# Patient Record
Sex: FEMALE | Race: BLACK OR AFRICAN AMERICAN | Employment: OTHER | URBAN - METROPOLITAN AREA
[De-identification: names, ages, dates, MRNs, and addresses within clinical notes are randomized per-mention and may not be internally consistent; named-entity substitution may affect disease eponyms.]

---

## 2017-06-08 ENCOUNTER — HOSPITAL ENCOUNTER (OUTPATIENT)
Dept: LAB | Age: 47
Discharge: HOME | End: 2017-06-08

## 2017-06-08 LAB — T-SPOT TB TEST (EMPLOYEE),XTBE: NORMAL

## 2017-06-08 PROCEDURE — 36415 COLL VENOUS BLD VENIPUNCTURE: CPT | Performed by: EMERGENCY MEDICINE

## 2017-11-18 ENCOUNTER — HOSPITAL ENCOUNTER (EMERGENCY)
Age: 47
Discharge: HOME | End: 2017-11-18
Attending: EMERGENCY MEDICINE
Payer: SELF-PAY

## 2017-11-18 VITALS
WEIGHT: 174 LBS | RESPIRATION RATE: 20 BRPM | HEART RATE: 74 BPM | TEMPERATURE: 98.2 F | BODY MASS INDEX: 29.71 KG/M2 | DIASTOLIC BLOOD PRESSURE: 74 MMHG | HEIGHT: 64 IN | OXYGEN SATURATION: 99 % | SYSTOLIC BLOOD PRESSURE: 136 MMHG

## 2017-11-18 DIAGNOSIS — M54.10 RADICULAR PAIN: ICD-10-CM

## 2017-11-18 DIAGNOSIS — S16.1XXA STRAIN OF NECK MUSCLE, INITIAL ENCOUNTER: ICD-10-CM
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
## 2017-11-18 PROCEDURE — 99282 EMERGENCY DEPT VISIT SF MDM: CPT

## 2017-11-18 RX ORDER — CYCLOBENZAPRINE HCL 5 MG
10 TABLET ORAL 3 TIMES DAILY
Qty: 9 TAB | Refills: 0 | Status: SHIPPED | OUTPATIENT
Start: 2017-11-18 | End: 2017-12-28

## 2017-11-18 RX ORDER — NAPROXEN 500 MG/1
500 TABLET ORAL 2 TIMES DAILY WITH MEALS
Qty: 20 TAB | Refills: 0 | Status: SHIPPED | OUTPATIENT
Start: 2017-11-18 | End: 2017-11-28

## 2017-11-18 NOTE — LETTER
[address] [place] [address] [facility] 
[address] [facility] [id number] [phone] Work/School Note Date: 11/18/2017 To Whom It May concern: 
 
Randolph Oppenheim was seen and treated today in the emergency room by the following provider(s): 
Attending Provider: Joleen Gold MD 
Physician Assistant: Tiana Bradshaw PA-C. Randolph Oppenheim return to work 11/21/17 Sincerely, Tiana Bradshaw PA-C
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
## 2017-11-18 NOTE — ED NOTES
I have reviewed discharge instructions with the patient. The patient verbalized understanding. Current Discharge Medication List      START taking these medications    Details   cyclobenzaprine (FLEXERIL) 5 mg tablet Take 2 Tabs by mouth three (3) times daily. Qty: 9 Tab, Refills: 0      naproxen (NAPROSYN) 500 mg tablet Take 1 Tab by mouth two (2) times daily (with meals) for 10 days.   Qty: 20 Tab, Refills: 0         Patient armband removed and shredded

## 2017-11-18 NOTE — DISCHARGE INSTRUCTIONS
Back Pain: Care Instructions  Your Care Instructions    Back pain has many possible causes. It is often related to problems with muscles and ligaments of the back. It may also be related to problems with the nerves, discs, or bones of the back. Moving, lifting, standing, sitting, or sleeping in an awkward way can strain the back. Sometimes you don't notice the injury until later. Arthritis is another common cause of back pain. Although it may hurt a lot, back pain usually improves on its own within several weeks. Most people recover in 12 weeks or less. Using good home treatment and being careful not to stress your back can help you feel better sooner. Follow-up care is a key part of your treatment and safety. Be sure to make and go to all appointments, and call your doctor if you are having problems. It's also a good idea to know your test results and keep a list of the medicines you take. How can you care for yourself at home? · Sit or lie in positions that are most comfortable and reduce your pain. Try one of these positions when you lie down:  ¨ Lie on your back with your knees bent and supported by large pillows. ¨ Lie on the floor with your legs on the seat of a sofa or chair. Maria Elena Kevin on your side with your knees and hips bent and a pillow between your legs. ¨ Lie on your stomach if it does not make pain worse. · Do not sit up in bed, and avoid soft couches and twisted positions. Bed rest can help relieve pain at first, but it delays healing. Avoid bed rest after the first day of back pain. · Change positions every 30 minutes. If you must sit for long periods of time, take breaks from sitting. Get up and walk around, or lie in a comfortable position. · Try using a heating pad on a low or medium setting for 15 to 20 minutes every 2 or 3 hours. Try a warm shower in place of one session with the heating pad. · You can also try an ice pack for 10 to 15 minutes every 2 to 3 hours.  Put a thin cloth between the ice pack and your skin. · Take pain medicines exactly as directed. ¨ If the doctor gave you a prescription medicine for pain, take it as prescribed. ¨ If you are not taking a prescription pain medicine, ask your doctor if you can take an over-the-counter medicine. · Take short walks several times a day. You can start with 5 to 10 minutes, 3 or 4 times a day, and work up to longer walks. Walk on level surfaces and avoid hills and stairs until your back is better. · Return to work and other activities as soon as you can. Continued rest without activity is usually not good for your back. · To prevent future back pain, do exercises to stretch and strengthen your back and stomach. Learn how to use good posture, safe lifting techniques, and proper body mechanics. When should you call for help? Call your doctor now or seek immediate medical care if:  ? · You have new or worsening numbness in your legs. ? · You have new or worsening weakness in your legs. (This could make it hard to stand up.)   ? · You lose control of your bladder or bowels. ? Watch closely for changes in your health, and be sure to contact your doctor if:  ? · Your pain gets worse. ? · You are not getting better after 2 weeks. Where can you learn more? Go to http://kosta-michelle.info/. Enter H259 in the search box to learn more about \"Back Pain: Care Instructions. \"  Current as of: March 21, 2017  Content Version: 11.4  © 6152-7076 [employer]. Care instructions adapted under license by [employer] (which disclaims liability or warranty for this information). If you have questions about a medical condition or this instruction, always ask your healthcare professional. [place] [address] any warranty or liability for your use of this information. Neck Strain: Care Instructions  Your Care Instructions    You have strained the muscles and ligaments in your neck.  A sudden, awkward movement can strain the neck. This often occurs with falls or car accidents or during certain sports. Everyday activities like working on a computer or sleeping can also cause neck strain if they force you to hold your neck in an awkward position for a long time. It is common for neck pain to get worse for a day or two after an injury, but it should start to feel better after that. You may have more pain and stiffness for several days before it gets better. This is expected. It may take a few weeks or longer for it to heal completely. Good home treatment can help you get better faster and avoid future neck problems. Follow-up care is a key part of your treatment and safety. Be sure to make and go to all appointments, and call your doctor if you are having problems. It's also a good idea to know your test results and keep a list of the medicines you take. How can you care for yourself at home? · If you were given a neck brace (cervical collar) to limit neck motion, wear it as instructed for as many days as your doctor tells you to. Do not wear it longer than you were told to. Wearing a brace for too long can make neck stiffness worse and weaken the neck muscles. · You can try using heat or ice to see if it helps. ¨ Try using a heating pad on a low or medium setting for 15 to 20 minutes every 2 to 3 hours. Try a warm shower in place of one session with the heating pad. You can also buy single-use heat wraps that last up to 8 hours. ¨ You can also try an ice pack for 10 to 15 minutes every 2 to 3 hours. · Take pain medicines exactly as directed. ¨ If the doctor gave you a prescription medicine for pain, take it as prescribed. ¨ If you are not taking a prescription pain medicine, ask your doctor if you can take an over-the-counter medicine. · Gently rub the area to relieve pain and help with blood flow. Do not massage the area if it hurts to do so.   · Do not do anything that makes the pain worse. Take it easy for a couple of days. You can do your usual activities if they do not hurt your neck or put it at risk for more stress or injury. · Try sleeping on a special neck pillow. Place it under your neck, not under your head. Placing a tightly rolled-up towel under your neck while you sleep will also work. If you use a neck pillow or rolled towel, do not use your regular pillow at the same time. · To prevent future neck pain, do exercises to stretch and strengthen your neck and back. Learn how to use good posture, safe lifting techniques, and proper body mechanics. When should you call for help? Call 911 anytime you think you may need emergency care. For example, call if:  ? · You are unable to move an arm or a leg at all. ?Call your doctor now or seek immediate medical care if:  ? · You have new or worse symptoms in your arms, legs, chest, belly, or buttocks. Symptoms may include:  ¨ Numbness or tingling. ¨ Weakness. ¨ Pain. ? · You lose bladder or bowel control. ? Watch closely for changes in your health, and be sure to contact your doctor if:  ? · You are not getting better as expected. Where can you learn more? Go to http://kosta-michelle.info/. Enter M253 in the search box to learn more about \"Neck Strain: Care Instructions. \"  Current as of: March 21, 2017  Content Version: 11.4  © 2572-0985 [employer]. Care instructions adapted under license by [employer] (which disclaims liability or warranty for this information). If you have questions about a medical condition or this instruction, always ask your healthcare professional. [place] [address] any warranty or liability for your use of this information. Pinched Nerve in the Neck: Care Instructions  Your Care Instructions  A pinched nerve in the neck happens when a vertebra or disc in the upper part of your spine is damaged. This damage can happen because of an injury. Or it can just happen with age. The changes caused by the damage may put pressure on a nearby nerve root, pinching it. This causes symptoms such as sharp pain in your neck, shoulder, arm, hand, or back. You may also have tingling or numbness. Sometimes it makes your arm weaker. The symptoms are usually worse when you turn your head or strain your neck. For many people, the symptoms get better over time and finally go away. Early treatment usually includes medicines for pain and swelling. Sometimes physical therapy and special exercises may help. Follow-up care is a key part of your treatment and safety. Be sure to make and go to all appointments, and call your doctor if you are having problems. It's also a good idea to know your test results and keep a list of the medicines you take. How can you care for yourself at home? · Be safe with medicines. Read and follow all instructions on the label. ¨ If the doctor gave you a prescription medicine for pain, take it as prescribed. ¨ If you are not taking a prescription pain medicine, ask your doctor if you can take an over-the-counter medicine. · Try using a heating pad on a low or medium setting for 15 to 20 minutes every 2 or 3 hours. Try a warm shower in place of one session with the heating pad. You can also buy single-use heat wraps that last up to 8 hours. · You can also try an ice pack for 10 to 15 minutes every 2 to 3 hours. There isn't strong evidence that either heat or ice will help. But you can try them to see if they help you. · Don't spend too long in one position. Take short breaks to move around and change positions. · Wear a seat belt and shoulder harness when you are in a car. · Sleep with a pillow under your head and neck that keeps your neck straight. · If you were given a neck brace (cervical collar) to limit neck motion, wear it as instructed for as many days as your doctor tells you to. Do not wear it longer than you were told to. Wearing a brace for too long can lead to neck stiffness and can weaken the neck muscles. · Follow your doctor's instructions for gentle neck-stretching exercises. · Do not smoke. Smoking can slow healing of your discs. If you need help quitting, talk to your doctor about stop-smoking programs and medicines. These can increase your chances of quitting for good. · Avoid strenuous work or exercise until your doctor says it is okay. When should you call for help? Call 911 anytime you think you may need emergency care. For example, call if:  ? · You are unable to move an arm or a leg at all. ?Call your doctor now or seek immediate medical care if:  ? · You have new or worse symptoms in your arms, legs, chest, belly, or buttocks. Symptoms may include:  ¨ Numbness or tingling. ¨ Weakness. ¨ Pain. ? · You lose bladder or bowel control. ? Watch closely for changes in your health, and be sure to contact your doctor if:  ? · You are not getting better as expected. Where can you learn more? Go to http://kosta-michelle.info/. Enter Y688 in the search box to learn more about \"Pinched Nerve in the Neck: Care Instructions. \"  Current as of: March 21, 2017  Content Version: 11.4  © 3911-9602 [employer]. Care instructions adapted under license by [employer] (which disclaims liability or warranty for this information). If you have questions about a medical condition or this instruction, always ask your healthcare professional. [place] [address] any warranty or liability for your use of this information. Motor Vehicle Accident: Care Instructions  Your Care Instructions    You were seen by a doctor after a motor vehicle accident. Because of the accident, you may be sore for several days. Over the next few days, you may hurt more than you did just after the accident. The doctor has checked you carefully, but problems can develop later.  If you notice any problems or new symptoms, get medical treatment right away. Follow-up care is a key part of your treatment and safety. Be sure to make and go to all appointments, and call your doctor if you are having problems. It's also a good idea to know your test results and keep a list of the medicines you take. How can you care for yourself at home? · Keep track of any new symptoms or changes in your symptoms. · Take it easy for the next few days, or longer if you are not feeling well. Do not try to do too much. · Put ice or a cold pack on any sore areas for 10 to 20 minutes at a time to stop swelling. Put a thin cloth between the ice pack and your skin. Do this several times a day for the first 2 days. · Be safe with medicines. Take pain medicines exactly as directed. ¨ If the doctor gave you a prescription medicine for pain, take it as prescribed. ¨ If you are not taking a prescription pain medicine, ask your doctor if you can take an over-the-counter medicine. · Do not drive after taking a prescription pain medicine. · Do not do anything that makes the pain worse. · Do not drink any alcohol for 24 hours or until your doctor tells you it is okay. When should you call for help? Call 911 if:  ? · You passed out (lost consciousness). ?Call your doctor now or seek immediate medical care if:  ? · You have new or worse belly pain. ? · You have new or worse trouble breathing. ? · You have new or worse head pain. ? · You have new pain, or your pain gets worse. ? · You have new symptoms, such as numbness or vomiting. ? Watch closely for changes in your health, and be sure to contact your doctor if:  ? · You are not getting better as expected. Where can you learn more? Go to http://kosta-michelle.info/. Enter F084 in the search box to learn more about \"Motor Vehicle Accident: Care Instructions. \"  Current as of: March 20, 2017  Content Version: 11.4  © 6491-3880 [employer]. Care instructions adapted under license by [employer] (which disclaims liability or warranty for this information). If you have questions about a medical condition or this instruction, always ask your healthcare professional. [address] any warranty or liability for your use of this information.
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
## 2017-12-28 ENCOUNTER — HOSPITAL ENCOUNTER (EMERGENCY)
Age: 47
Discharge: HOME | End: 2017-12-28
Attending: EMERGENCY MEDICINE
Payer: MEDICAID

## 2017-12-28 ENCOUNTER — APPOINTMENT (OUTPATIENT)
Dept: CT IMAGING | Age: 47
End: 2017-12-28
Attending: EMERGENCY MEDICINE
Payer: MEDICAID

## 2017-12-28 VITALS
HEIGHT: 65 IN | DIASTOLIC BLOOD PRESSURE: 88 MMHG | HEART RATE: 72 BPM | SYSTOLIC BLOOD PRESSURE: 101 MMHG | TEMPERATURE: 98.2 F | WEIGHT: 170 LBS | BODY MASS INDEX: 28.32 KG/M2 | RESPIRATION RATE: 20 BRPM | OXYGEN SATURATION: 98 %

## 2017-12-28 DIAGNOSIS — A59.03 TRICHOMONAL CYSTITIS: ICD-10-CM

## 2017-12-28 DIAGNOSIS — N30.01 ACUTE CYSTITIS WITH HEMATURIA: Primary | ICD-10-CM

## 2017-12-28 LAB
ALBUMIN SERPL-MCNC: 3.5 G/DL (ref 3.4–5)
ALBUMIN/GLOB SERPL: 1 {RATIO} (ref 0.8–1.7)
ALP SERPL-CCNC: 83 U/L (ref 45–117)
ALT SERPL-CCNC: 28 U/L (ref 13–56)
AMYLASE SERPL-CCNC: 66 U/L (ref 25–115)
ANION GAP SERPL CALC-SCNC: 8 MMOL/L (ref 3–18)
APPEARANCE UR: CLEAR
AST SERPL-CCNC: 27 U/L (ref 15–37)
BASOPHILS # BLD: 0 K/UL (ref 0–0.06)
BASOPHILS NFR BLD: 0 % (ref 0–2)
BILIRUB SERPL-MCNC: 0.3 MG/DL (ref 0.2–1)
BILIRUB UR QL: NEGATIVE
BUN SERPL-MCNC: 8 MG/DL (ref 7–18)
BUN/CREAT SERPL: 10 (ref 12–20)
CALCIUM SERPL-MCNC: 8.9 MG/DL (ref 8.5–10.1)
CHLORIDE SERPL-SCNC: 104 MMOL/L (ref 100–108)
CO2 SERPL-SCNC: 28 MMOL/L (ref 21–32)
COLOR UR: YELLOW
CREAT SERPL-MCNC: 0.82 MG/DL (ref 0.6–1.3)
DIFFERENTIAL METHOD BLD: ABNORMAL
EOSINOPHIL # BLD: 0.1 K/UL (ref 0–0.4)
EOSINOPHIL NFR BLD: 1 % (ref 0–5)
EPITH CASTS URNS QL MICRO: ABNORMAL /LPF (ref 0–5)
ERYTHROCYTE [DISTWIDTH] IN BLOOD BY AUTOMATED COUNT: 11.9 % (ref 11.6–14.5)
GLOBULIN SER CALC-MCNC: 3.6 G/DL (ref 2–4)
GLUCOSE SERPL-MCNC: 112 MG/DL (ref 74–99)
GLUCOSE UR STRIP.AUTO-MCNC: NEGATIVE MG/DL
HCT VFR BLD AUTO: 32.9 % (ref 35–45)
HEMOCCULT STL QL: NEGATIVE
HGB BLD-MCNC: 10.4 G/DL (ref 12–16)
HGB UR QL STRIP: ABNORMAL
KETONES UR QL STRIP.AUTO: NEGATIVE MG/DL
LEUKOCYTE ESTERASE UR QL STRIP.AUTO: ABNORMAL
LIPASE SERPL-CCNC: 86 U/L (ref 73–393)
LYMPHOCYTES # BLD: 2.2 K/UL (ref 0.9–3.6)
LYMPHOCYTES NFR BLD: 38 % (ref 21–52)
MCH RBC QN AUTO: 27.1 PG (ref 24–34)
MCHC RBC AUTO-ENTMCNC: 31.6 G/DL (ref 31–37)
MCV RBC AUTO: 85.7 FL (ref 74–97)
MONOCYTES # BLD: 0.7 K/UL (ref 0.05–1.2)
MONOCYTES NFR BLD: 13 % (ref 3–10)
MUCOUS THREADS URNS QL MICRO: ABNORMAL /LPF
NEUTS SEG # BLD: 2.8 K/UL (ref 1.8–8)
NEUTS SEG NFR BLD: 48 % (ref 40–73)
NITRITE UR QL STRIP.AUTO: NEGATIVE
PH UR STRIP: 6 [PH] (ref 5–8)
PLATELET # BLD AUTO: 273 K/UL (ref 135–420)
PMV BLD AUTO: 10.6 FL (ref 9.2–11.8)
POTASSIUM SERPL-SCNC: 3.1 MMOL/L (ref 3.5–5.5)
PROT SERPL-MCNC: 7.1 G/DL (ref 6.4–8.2)
PROT UR STRIP-MCNC: NEGATIVE MG/DL
RBC # BLD AUTO: 3.84 M/UL (ref 4.2–5.3)
RBC #/AREA URNS HPF: ABNORMAL /HPF (ref 0–5)
SODIUM SERPL-SCNC: 140 MMOL/L (ref 136–145)
SP GR UR REFRACTOMETRY: 1.02 (ref 1–1.03)
TRICHOMONAS UR QL MICRO: ABNORMAL
UROBILINOGEN UR QL STRIP.AUTO: 1 EU/DL (ref 0.2–1)
WBC # BLD AUTO: 5.8 K/UL (ref 4.6–13.2)
WBC URNS QL MICRO: ABNORMAL /HPF (ref 0–4)

## 2017-12-28 PROCEDURE — 99283 EMERGENCY DEPT VISIT LOW MDM: CPT

## 2017-12-28 PROCEDURE — 82270 OCCULT BLOOD FECES: CPT

## 2017-12-28 PROCEDURE — 74011250636 HC RX REV CODE- 250/636: Performed by: EMERGENCY MEDICINE

## 2017-12-28 PROCEDURE — 80053 COMPREHEN METABOLIC PANEL: CPT | Performed by: EMERGENCY MEDICINE

## 2017-12-28 PROCEDURE — 96360 HYDRATION IV INFUSION INIT: CPT

## 2017-12-28 PROCEDURE — 74177 CT ABD & PELVIS W/CONTRAST: CPT

## 2017-12-28 PROCEDURE — 74011636320 HC RX REV CODE- 636/320: Performed by: EMERGENCY MEDICINE

## 2017-12-28 PROCEDURE — 83690 ASSAY OF LIPASE: CPT | Performed by: EMERGENCY MEDICINE

## 2017-12-28 PROCEDURE — 85025 COMPLETE CBC W/AUTO DIFF WBC: CPT | Performed by: EMERGENCY MEDICINE

## 2017-12-28 PROCEDURE — 81001 URINALYSIS AUTO W/SCOPE: CPT | Performed by: EMERGENCY MEDICINE

## 2017-12-28 PROCEDURE — 82150 ASSAY OF AMYLASE: CPT | Performed by: EMERGENCY MEDICINE

## 2017-12-28 RX ORDER — NITROFURANTOIN 25; 75 MG/1; MG/1
100 CAPSULE ORAL 2 TIMES DAILY
Qty: 20 CAP | Refills: 0 | Status: SHIPPED | OUTPATIENT
Start: 2017-12-28 | End: 2018-01-07

## 2017-12-28 RX ORDER — METRONIDAZOLE 500 MG/1
500 TABLET ORAL 2 TIMES DAILY
Qty: 14 TAB | Refills: 0 | Status: SHIPPED | OUTPATIENT
Start: 2017-12-28 | End: 2018-01-04

## 2017-12-28 RX ADMIN — IOPAMIDOL 100 ML: 612 INJECTION, SOLUTION INTRAVENOUS at 07:44

## 2017-12-28 RX ADMIN — SODIUM CHLORIDE 1000 ML: 900 INJECTION, SOLUTION INTRAVENOUS at 07:18

## 2017-12-28 NOTE — ED NOTES
Patient noted to have removed gown and dressed back into her clothes. Instructed patient to please get back into her gown, for ordered CT scan.

## 2017-12-28 NOTE — ED NOTES
Patient states she went to the bathroom this morning and noted drips of blood going into the toilet. Patient states she does not know if it is coming from her vagina or rectum. No active bleeding noted to either site. Guiac stool negative and Provider informed. Patient denies taking any blood thinners.

## 2017-12-28 NOTE — ED TRIAGE NOTES
Patient reports to ED with complaint of vaginal bleeding and a cramping sensation starting this morning.

## 2017-12-28 NOTE — ED PROVIDER NOTES
EMERGENCY DEPARTMENT HISTORY AND PHYSICAL EXAM    6:39 AM      Date: 12/28/2017  Patient Name: Sathya Jimenez    History of Presenting Illness     Chief Complaint   Patient presents with    Vaginal Bleeding         History Provided By: Patient    Chief Complaint: abdominal pain   Duration: began this morning after urination  Timing:  Constant  Location: right side  Quality: Aching  Severity: mild  Modifying Factors:   Associated Symptoms: vaginal bleeding      Additional History (Context): Sathya Jimenez is a 41 y.o. female with history of fibroids and hysterectomy who presents with right-sided abdominal pain and vaginal bleeding. Patient states that she urinated this morning and noticed bright red blood in the toilet and after wiping herself. She denies any previous complications s/p hysterectomy. No other symptoms or concerns were expressed. PCP: Kristen Juarez MD        Past History     Past Medical History:  Past Medical History:   Diagnosis Date    Fibroids     uterine fibroids       Past Surgical History:  Past Surgical History:   Procedure Laterality Date    HX HYSTERECTOMY      HX OTHER SURGICAL      surgery for punctured intestine    HX OVARIAN CYST REMOVAL Bilateral     HX TUBAL LIGATION         Family History:  Family History   Problem Relation Age of Onset    Hypertension Mother     Diabetes Mother        Social History:  Social History   Substance Use Topics    Smoking status: Never Smoker    Smokeless tobacco: Never Used    Alcohol use No       Allergies:  No Known Allergies      Review of Systems     Review of Systems   Constitutional: Negative. HENT: Negative. Eyes: Negative. Respiratory: Negative. Cardiovascular: Negative. Gastrointestinal: Positive for abdominal pain. Endocrine: Negative. Genitourinary: Positive for vaginal bleeding. Musculoskeletal: Negative. Skin: Negative. Allergic/Immunologic: Negative. Neurological: Negative. Hematological: Negative. Psychiatric/Behavioral: Negative. All other systems reviewed and are negative. Physical Exam     Visit Vitals    /88    Pulse 72    Temp 98.2 °F (36.8 °C)    Resp 20    Ht 5' 5\" (1.651 m)    Wt 77.1 kg (170 lb)    LMP 01/15/2016    SpO2 98%    BMI 28.29 kg/m2       Physical Exam   Constitutional: She is oriented to person, place, and time. She appears well-developed and well-nourished. No distress. HENT:   Head: Normocephalic. Right Ear: External ear normal.   Left Ear: External ear normal.   Mouth/Throat: No oropharyngeal exudate. Eyes: Conjunctivae and EOM are normal. Pupils are equal, round, and reactive to light. Right eye exhibits no discharge. Left eye exhibits no discharge. No scleral icterus. Neck: Normal range of motion. Neck supple. No JVD present. No tracheal deviation present. No thyromegaly present. Cardiovascular: Normal rate, regular rhythm, normal heart sounds and intact distal pulses. Exam reveals no gallop and no friction rub. No murmur heard. Pulmonary/Chest: Effort normal and breath sounds normal. No stridor. No respiratory distress. She has no wheezes. She has no rales. She exhibits no tenderness. Abdominal: Soft. Bowel sounds are normal. She exhibits no distension and no mass. There is tenderness (right lower quadrant ). There is no rebound and no guarding. Genitourinary: There is no lesion on the right labia. There is no lesion on the left labia. No bleeding in the vagina. No vaginal discharge found. Musculoskeletal: Normal range of motion. She exhibits no edema or tenderness. Lymphadenopathy:     She has no cervical adenopathy. Neurological: She is alert and oriented to person, place, and time. She displays normal reflexes. No cranial nerve deficit. She exhibits normal muscle tone. Coordination normal.   Skin: Skin is warm and dry. No rash noted. She is not diaphoretic. No erythema. No pallor.    Nursing note and vitals reviewed. Rectal exam: brown stool hemocult - negative for blood      Diagnostic Study Results     Labs -  Recent Results (from the past 12 hour(s))   URINALYSIS W/ RFLX MICROSCOPIC    Collection Time: 12/28/17  6:25 AM   Result Value Ref Range    Color YELLOW      Appearance CLEAR      Specific gravity 1.016 1.005 - 1.030      pH (UA) 6.0 5.0 - 8.0      Protein NEGATIVE  NEG mg/dL    Glucose NEGATIVE  NEG mg/dL    Ketone NEGATIVE  NEG mg/dL    Bilirubin NEGATIVE  NEG      Blood MODERATE (A) NEG      Urobilinogen 1.0 0.2 - 1.0 EU/dL    Nitrites NEGATIVE  NEG      Leukocyte Esterase MODERATE (A) NEG     POC FECAL OCCULT BLOOD    Collection Time: 12/28/17  6:35 AM   Result Value Ref Range    Occult blood, stool (POC) NEGATIVE  NEG         Medical Decision Making   I am the first provider for this patient. I reviewed the vital signs, available nursing notes, past medical history, past surgical history, family history and social history. Vital Signs-Reviewed the patient's vital signs. Pulse Oximetry Analysis -  98% on room air    Records Reviewed: Nursing Notes (Time of Review: 6:39 AM)    Provider Notes (Medical Decision Making): hemorrhagic cystitis, bladder tumor, appendicitis, UTI    ED Course: Progress Notes, Reevaluation, and Consults:    7:00 AM : Pt care transferred to Dr. Juan R Rivero  ,ED provider. History of patient complaint(s), available diagnostic reports and current treatment plan has been discussed thoroughly. Bedside rounding on patient occured : yes .   Intended disposition of patient : TBD  Pending diagnostics reports and/or labs (please list): CT Abd    Diagnosis     Clinical Impression:     Disposition: TBD    Follow-up Information     None           Patient's Medications   Start Taking    No medications on file   Continue Taking    No medications on file   These Medications have changed    No medications on file   Stop Taking    CYCLOBENZAPRINE (FLEXERIL) 5 MG TABLET    Take 2 Tabs by mouth three (3) times daily. _______________________________    Attestations:  Satay Easley acting as a scribe for and in the presence of Sandor Kimble MD      December 28, 2017 at 6:39 AM       Provider Attestation:      I personally performed the services described in the documentation, reviewed the documentation, as recorded by the scribe in my presence, and it accurately and completely records my words and actions. December 28, 2017 at 6:39 AM - Sandor Kimble MD    _______________________________  Results reviewed with pt, she agrees with dispo and F/U plan.   Rosa aRm MD  8:56 AM

## 2017-12-28 NOTE — LETTER
[address] [place] [address] [facility] 
[address] [facility] [address] [phone] Work/School Note Date: 12/28/2017 To Whom It May concern: 
 
Mary Wilson was seen and treated today in the emergency room by the following provider(s): 
Attending Provider: Tremaine Chadwick MD.   
 
Mary Wilson Return to work on 12-29-17.   
 
Sincerely, 
 
 
 
 
Tremaine Chadwick MD

## 2017-12-28 NOTE — ED NOTES
Dinorah Kaufman is a 41 y.o. female that was discharged in good condition. The patients diagnosis, condition and treatment were explained to  patient and aftercare instructions were given. The patient verbalized understanding. Patient armband removed and shredded.

## 2017-12-28 NOTE — DISCHARGE INSTRUCTIONS

## 2018-03-31 PROCEDURE — 99282 EMERGENCY DEPT VISIT SF MDM: CPT

## 2018-04-01 ENCOUNTER — HOSPITAL ENCOUNTER (EMERGENCY)
Age: 48
Discharge: HOME | End: 2018-04-01
Attending: EMERGENCY MEDICINE
Payer: MEDICAID

## 2018-04-01 VITALS
TEMPERATURE: 97.8 F | HEART RATE: 82 BPM | RESPIRATION RATE: 24 BRPM | BODY MASS INDEX: 28.29 KG/M2 | DIASTOLIC BLOOD PRESSURE: 86 MMHG | WEIGHT: 170 LBS | OXYGEN SATURATION: 100 % | SYSTOLIC BLOOD PRESSURE: 132 MMHG

## 2018-04-01 DIAGNOSIS — H65.112 ACUTE MUCOID OTITIS MEDIA OF LEFT EAR: Primary | ICD-10-CM

## 2018-04-01 PROCEDURE — 74011250637 HC RX REV CODE- 250/637: Performed by: EMERGENCY MEDICINE

## 2018-04-01 RX ORDER — AMOXICILLIN 500 MG/1
500 TABLET, FILM COATED ORAL 3 TIMES DAILY
Qty: 30 TAB | Refills: 0 | Status: SHIPPED | OUTPATIENT
Start: 2018-04-01 | End: 2018-09-23

## 2018-04-01 RX ORDER — HYDROCODONE BITARTRATE AND ACETAMINOPHEN 5; 325 MG/1; MG/1
1 TABLET ORAL
Qty: 8 TAB | Refills: 0 | Status: SHIPPED | OUTPATIENT
Start: 2018-04-01 | End: 2018-09-23

## 2018-04-01 RX ORDER — AMOXICILLIN 250 MG/1
500 CAPSULE ORAL
Status: COMPLETED | OUTPATIENT
Start: 2018-04-01 | End: 2018-04-01

## 2018-04-01 RX ORDER — HYDROCODONE BITARTRATE AND ACETAMINOPHEN 5; 325 MG/1; MG/1
1 TABLET ORAL
Qty: 8 TAB | Refills: 0 | Status: SHIPPED | OUTPATIENT
Start: 2018-04-01 | End: 2018-04-01

## 2018-04-01 RX ORDER — HYDROCODONE BITARTRATE AND ACETAMINOPHEN 5; 325 MG/1; MG/1
1 TABLET ORAL
Status: COMPLETED | OUTPATIENT
Start: 2018-04-01 | End: 2018-04-01

## 2018-04-01 RX ADMIN — AMOXICILLIN 500 MG: 250 CAPSULE ORAL at 00:47

## 2018-04-01 RX ADMIN — HYDROCODONE BITARTRATE AND ACETAMINOPHEN 1 TABLET: 5; 325 TABLET ORAL at 00:47

## 2018-04-01 NOTE — ED PROVIDER NOTES
EMERGENCY DEPARTMENT HISTORY AND PHYSICAL EXAM    12:27 AM      Date: 4/1/2018  Patient Name: Uriel Gr    History of Presenting Illness     No chief complaint on file. History Provided By: Patient    Chief Complaint: Ear Pain  Duration:  Weeks  Timing:  Acute and Constant  Location: Left ear  Quality: Aching  Severity: Moderate  Modifying Factors: Worsens at night. and no alleviating factors. Associated Symptoms: Left temple and jaw pain      Additional History (Context): Uriel Gr is a 41 y.o. female with No significant past medical history who presents with complaints of acute, constant, moderate and aching left ear pain which \"radiates\"  to her jaw and to her temple for two weeks. The patient also notes that she feels the pain all day but it worsens at night and no alleviating factors. The patient has no other symptoms or complaints at this time. PCP: Darell Sandifer, MD    Current Outpatient Prescriptions   Medication Sig Dispense Refill    IBUPROFEN PO Take  by mouth.  amoxicillin 500 mg tab Take 500 mg by mouth three (3) times daily. 30 Tab 0    HYDROcodone-acetaminophen (NORCO) 5-325 mg per tablet Take 1 Tab by mouth every six (6) hours as needed for Pain. Max Daily Amount: 4 Tabs.  8 Tab 0       Past History     Past Medical History:  Past Medical History:   Diagnosis Date    Fibroids     uterine fibroids       Past Surgical History:  Past Surgical History:   Procedure Laterality Date    HX HYSTERECTOMY      HX OTHER SURGICAL      surgery for punctured intestine    HX OVARIAN CYST REMOVAL Bilateral     HX TUBAL LIGATION         Family History:  Family History   Problem Relation Age of Onset    Hypertension Mother     Diabetes Mother        Social History:  Social History   Substance Use Topics    Smoking status: Never Smoker    Smokeless tobacco: Never Used    Alcohol use No       Allergies:  No Known Allergies      Review of Systems       Review of Systems Constitutional: Negative. Negative for chills and fever. HENT: Positive for ear pain (left). Eyes: Negative. Respiratory: Negative. Cardiovascular: Negative. Gastrointestinal: Negative. Negative for diarrhea and nausea. Endocrine: Negative. Genitourinary: Negative. Musculoskeletal:        +Jaw Pain   Skin: Negative. Allergic/Immunologic: Negative. Neurological: Negative. Hematological: Negative. Psychiatric/Behavioral: Negative. All other systems reviewed and are negative. Physical Exam     Visit Vitals    /86 (BP 1 Location: Left arm, BP Patient Position: At rest)    Pulse 82    Temp 97.8 °F (36.6 °C)    Resp 24    Wt 77.1 kg (170 lb)    LMP 01/15/2016    SpO2 100%    BMI 28.29 kg/m2         Physical Exam   Constitutional: She is oriented to person, place, and time. She appears well-developed and well-nourished. No distress. HENT:   Head: Normocephalic. Right Ear: External ear normal.   Left Ear: External ear normal. Tympanic membrane is erythematous. Mouth/Throat: No oropharyngeal exudate. Dull left TM   Eyes: Conjunctivae and EOM are normal. Pupils are equal, round, and reactive to light. Right eye exhibits no discharge. Left eye exhibits no discharge. No scleral icterus. Neck: Normal range of motion. Neck supple. No JVD present. No tracheal deviation present. No thyromegaly present. Cardiovascular: Normal rate, regular rhythm, normal heart sounds and intact distal pulses. Exam reveals no gallop and no friction rub. No murmur heard. Pulmonary/Chest: Effort normal and breath sounds normal. No stridor. No respiratory distress. She has no wheezes. She has no rales. She exhibits no tenderness. Abdominal: Soft. Bowel sounds are normal. She exhibits no distension and no mass. There is no tenderness. There is no rebound and no guarding. Musculoskeletal: Normal range of motion. She exhibits no edema or tenderness. Lymphadenopathy:     She has no cervical adenopathy. Neurological: She is alert and oriented to person, place, and time. She displays normal reflexes. No cranial nerve deficit. She exhibits normal muscle tone. Coordination normal.   Skin: Skin is warm and dry. No rash noted. She is not diaphoretic. No erythema. No pallor. Nursing note and vitals reviewed. Diagnostic Study Results     Labs -  No results found for this or any previous visit (from the past 12 hour(s)). Radiologic Studies -   No orders to display         Medical Decision Making   I am the first provider for this patient. I reviewed the vital signs, available nursing notes, past medical history, past surgical history, family history and social history. Vital Signs-Reviewed the patient's vital signs. Pulse Oximetry Analysis -  100% on room air (Interpretation)normal      Records Reviewed: Nursing Notes (Time of Review: 12:27 AM)    ED Course: Progress Notes, Reevaluation, and Consults:   Patient remained stable. Provider Notes (Medical Decision Making): Dif Dx: OM, URI,     Diagnosis     Clinical Impression:   1. Acute mucoid otitis media of left ear        Disposition:discharged    Follow-up Information     Follow up With Details Comments Contact Info    Bekah Celeste MD Call in 2 days For follow up [address] [facility]      [id number] [facility] EMERGENCY DEPT Go to As needed, If symptoms worsen [address] [id number] [phone]           Patient's Medications   Start Taking    AMOXICILLIN 500 MG TAB    Take 500 mg by mouth three (3) times daily. HYDROCODONE-ACETAMINOPHEN (NORCO) 5-325 MG PER TABLET    Take 1 Tab by mouth every six (6) hours as needed for Pain. Max Daily Amount: 4 Tabs. Continue Taking    IBUPROFEN PO    Take  by mouth.    These Medications have changed    No medications on file   Stop Taking    No medications on file _______________________________    Attestations:  Scribe Attestation     Archie Clark acting as a scribe for and in the presence of Reggie Todd MD      April 01, 2018 at 12:27 AM       Provider Attestation:      I personally performed the services described in the documentation, reviewed the documentation, as recorded by the scribe in my presence, and it accurately and completely records my words and actions.  April 01, 2018 at 12:27 AM - Reggie Todd MD    _______________________________

## 2018-04-01 NOTE — ED TRIAGE NOTES
Tightness/pressure to left temporal region radiating into left ear; per pt wind in her ear exacerbates pain

## 2018-04-01 NOTE — ED NOTES
Pt instructed to follow up with her PCP, take medications as prescribed, to use caution with narcotics, and to return for worsening ear pain/drainage/other concerns.

## 2018-09-23 ENCOUNTER — HOSPITAL ENCOUNTER (EMERGENCY)
Age: 48
Discharge: HOME | End: 2018-09-23
Attending: EMERGENCY MEDICINE
Payer: MEDICAID

## 2018-09-23 ENCOUNTER — APPOINTMENT (OUTPATIENT)
Dept: GENERAL RADIOLOGY | Age: 48
End: 2018-09-23
Attending: PHYSICIAN ASSISTANT
Payer: MEDICAID

## 2018-09-23 VITALS
HEIGHT: 65 IN | SYSTOLIC BLOOD PRESSURE: 108 MMHG | BODY MASS INDEX: 30.57 KG/M2 | HEART RATE: 94 BPM | OXYGEN SATURATION: 100 % | TEMPERATURE: 99.5 F | RESPIRATION RATE: 18 BRPM | WEIGHT: 183.5 LBS | DIASTOLIC BLOOD PRESSURE: 64 MMHG

## 2018-09-23 DIAGNOSIS — J06.9 ACUTE UPPER RESPIRATORY INFECTION: Primary | ICD-10-CM

## 2018-09-23 PROCEDURE — 71046 X-RAY EXAM CHEST 2 VIEWS: CPT

## 2018-09-23 PROCEDURE — 99282 EMERGENCY DEPT VISIT SF MDM: CPT

## 2018-09-23 RX ORDER — BENZONATATE 100 MG/1
100 CAPSULE ORAL
Qty: 30 CAP | Refills: 0 | Status: SHIPPED | OUTPATIENT
Start: 2018-09-23 | End: 2018-09-30

## 2018-09-23 RX ORDER — LORATADINE 10 MG/1
TABLET ORAL
Qty: 30 TAB | Refills: 0 | Status: SHIPPED | OUTPATIENT
Start: 2018-09-23 | End: 2018-12-16

## 2018-09-23 NOTE — DISCHARGE INSTRUCTIONS

## 2018-09-23 NOTE — ED NOTES
Juan Heart is a 57 y.o. female that was discharged in stable condition. The patients diagnosis, condition and treatment were explained to  patient and aftercare instructions were given. The patient verbalized understanding. Patient armband removed and shredded.

## 2018-09-23 NOTE — ED PROVIDER NOTES
Patient is a 57 y.o. female presenting with cough and headaches. The history is provided by the patient. Cough This is a new problem. Episode onset: 2 weeks. The problem occurs constantly. The problem has not changed since onset. The cough is non-productive. There has been no fever. Associated symptoms include chills, headaches, rhinorrhea, sore throat, myalgias and wheezing. Pertinent negatives include no sweats, no nausea, no vomiting and no confusion. She has tried nothing for the symptoms. She is not a smoker. Her past medical history does not include bronchitis, pneumonia, COPD, asthma or heart failure. Headache Pertinent negatives include no nausea and no vomiting. Past Medical History:  
Diagnosis Date  Fibroids   
 uterine fibroids Past Surgical History:  
Procedure Laterality Date  HX HYSTERECTOMY  HX OTHER SURGICAL    
 surgery for punctured intestine  HX OVARIAN CYST REMOVAL Bilateral   
 HX TUBAL LIGATION Family History:  
Problem Relation Age of Onset  Hypertension Mother  Diabetes Mother Social History Social History  Marital status: SINGLE Spouse name: N/A  
 Number of children: N/A  
 Years of education: N/A Occupational History  Not on file. Social History Main Topics  Smoking status: Never Smoker  Smokeless tobacco: Never Used  Alcohol use No  
 Drug use: No  
 Sexual activity: Not on file Other Topics Concern  Not on file Social History Narrative ALLERGIES: Review of patient's allergies indicates no known allergies. Review of Systems Constitutional: Positive for chills. HENT: Positive for rhinorrhea and sore throat. Respiratory: Positive for cough and wheezing. Gastrointestinal: Negative for nausea and vomiting. Musculoskeletal: Positive for myalgias. Neurological: Positive for headaches. Psychiatric/Behavioral: Negative for confusion. Vitals: 09/23/18 1336 BP: 108/64 Pulse: 94 Resp: 18 Temp: 99.5 °F (37.5 °C) SpO2: 100% Weight: 83.2 kg (183 lb 8 oz) Height: 5' 5\" (1.651 m) Physical Exam  
Constitutional: She is oriented to person, place, and time. She appears well-developed and well-nourished. No distress. NAD, well hydrated, non toxic HENT:  
Head: Normocephalic and atraumatic. Nose: Mucosal edema and rhinorrhea present. Right sinus exhibits no maxillary sinus tenderness and no frontal sinus tenderness. Left sinus exhibits no maxillary sinus tenderness and no frontal sinus tenderness. Mouth/Throat: Oropharynx is clear and moist. No oropharyngeal exudate. Eyes: Conjunctivae and EOM are normal. Pupils are equal, round, and reactive to light. Neck: Normal range of motion. Neck supple. Cardiovascular: Normal rate, regular rhythm and normal heart sounds. No murmur heard. Pulmonary/Chest: No respiratory distress. She has wheezes. She has no rales. Abdominal: Soft. She exhibits no distension. There is no tenderness. There is no guarding. Musculoskeletal: Normal range of motion. Lymphadenopathy:  
  She has no cervical adenopathy. Neurological: She is alert and oriented to person, place, and time. No cranial nerve deficit. Coordination normal.  
Skin: Skin is warm. No rash noted. She is not diaphoretic. Psychiatric: She has a normal mood and affect. Her behavior is normal.  
Nursing note and vitals reviewed. MDM Number of Diagnoses or Management Options Acute upper respiratory infection:  
Diagnosis management comments: 46yo F here with URI sx, cough for 2 weeks. No cp, sob, fever. Mils wheezing on exam, nasal drainage, post nasal drip. Otherwise WNL. Appears well. VSS. CXR clear. Will tx URI sx, refer to pcp ED Course Procedures

## 2018-11-21 ENCOUNTER — OFFICE VISIT (OUTPATIENT)
Dept: FAMILY MEDICINE CLINIC | Age: 48
End: 2018-11-21

## 2018-11-21 VITALS
HEART RATE: 75 BPM | WEIGHT: 181 LBS | BODY MASS INDEX: 30.16 KG/M2 | RESPIRATION RATE: 16 BRPM | OXYGEN SATURATION: 98 % | TEMPERATURE: 98.2 F | DIASTOLIC BLOOD PRESSURE: 78 MMHG | SYSTOLIC BLOOD PRESSURE: 114 MMHG | HEIGHT: 65 IN

## 2018-11-21 DIAGNOSIS — N32.81 OVERACTIVE BLADDER: ICD-10-CM

## 2018-11-21 DIAGNOSIS — Z98.890 H/O ABDOMINAL SURGERY: ICD-10-CM

## 2018-11-21 DIAGNOSIS — R52 PAIN OF MULTIPLE SITES: Primary | ICD-10-CM

## 2018-11-21 DIAGNOSIS — R20.0 NUMBNESS AND TINGLING OF HAND: ICD-10-CM

## 2018-11-21 DIAGNOSIS — R20.2 NUMBNESS AND TINGLING OF HAND: ICD-10-CM

## 2018-11-21 DIAGNOSIS — N89.8 VAGINAL DISCHARGE: ICD-10-CM

## 2018-11-21 DIAGNOSIS — Z13.220 SCREENING FOR HYPERLIPIDEMIA: ICD-10-CM

## 2018-11-21 DIAGNOSIS — N64.4 BREAST PAIN: ICD-10-CM

## 2018-11-21 DIAGNOSIS — Z13.1 SCREENING FOR DIABETES MELLITUS: ICD-10-CM

## 2018-11-21 RX ORDER — FLUCONAZOLE 150 MG/1
150 TABLET ORAL DAILY
Qty: 1 TAB | Refills: 0 | Status: SHIPPED | OUTPATIENT
Start: 2018-11-21 | End: 2018-11-27 | Stop reason: SDUPTHER

## 2018-11-21 RX ORDER — OXYBUTYNIN CHLORIDE 5 MG/1
5 TABLET ORAL DAILY
Qty: 30 TAB | Refills: 0 | Status: SHIPPED | OUTPATIENT
Start: 2018-11-21 | End: 2018-12-16

## 2018-11-21 NOTE — PATIENT INSTRUCTIONS
Carpal Tunnel Syndrome: Exercises Your Care Instructions Here are some examples of typical rehabilitation exercises for your condition. Start each exercise slowly. Ease off the exercise if you start to have pain. Your doctor or your physical or occupational therapist will tell you when you can start these exercises and which ones will work best for you. Warm-up stretches When you no longer have pain or numbness, you can do exercises to help prevent carpal tunnel syndrome from coming back. Do not do any stretch or movement that is uncomfortable or painful. 1. Rotate your wrist up, down, and from side to side. Repeat 4 times. 2. Stretch your fingers far apart. Relax them, and then stretch them again. Repeat 4 times. 3. Stretch your thumb by pulling it back gently, holding it, and then releasing it. Repeat 4 times. How to do the exercises Prayer stretch 1. Start with your palms together in front of your chest just below your chin. 2. Slowly lower your hands toward your waistline, keeping your hands close to your stomach and your palms together until you feel a mild to moderate stretch under your forearms. 3. Hold for at least 15 to 30 seconds. Repeat 2 to 4 times. Wrist flexor stretch 1. Extend your arm in front of you with your palm up. 2. Bend your wrist, pointing your hand toward the floor. 3. With your other hand, gently bend your wrist farther until you feel a mild to moderate stretch in your forearm. 4. Hold for at least 15 to 30 seconds. Repeat 2 to 4 times. Wrist extensor stretch 1. Repeat steps 1 through 4 of the stretch above, but begin with your extended hand palm down. Follow-up care is a key part of your treatment and safety. Be sure to make and go to all appointments, and call your doctor if you are having problems. It's also a good idea to know your test results and keep a list of the medicines you take. Where can you learn more? Go to http://kosta-michelle.info/. Enter N902 in the search box to learn more about \"Carpal Tunnel Syndrome: Exercises. \" Current as of: November 29, 2017 Content Version: 11.8 © 0943-0638 [employer]. Care instructions adapted under license by [employer] (which disclaims liability or warranty for this information). If you have questions about a medical condition or this instruction, always ask your healthcare professional. [address] any warranty or liability for your use of this information. Eating Healthy Foods: Care Instructions Your Care Instructions Eating healthy foods can help lower your risk for disease. Healthy food gives you energy and keeps your heart strong, your brain active, your muscles working, and your bones strong. A healthy diet includes a variety of foods from the basic food groups: grains, vegetables, fruits, milk and milk products, and meat and beans. Some people may eat more of their favorite foods from only one food group and, as a result, miss getting the nutrients they need. So, it is important to pay attention not only to what you eat but also to what you are missing from your diet. You can eat a healthy, balanced diet by making a few small changes. Follow-up care is a key part of your treatment and safety. Be sure to make and go to all appointments, and call your doctor if you are having problems. It's also a good idea to know your test results and keep a list of the medicines you take. How can you care for yourself at home? Look at what you eat · Keep a food diary for a week or two and record everything you eat or drink. Track the number of servings you eat from each food group. · For a balanced diet every day, eat a variety of: 
? 6 or more ounce-equivalents of grains, such as cereals, breads, crackers, rice, or pasta, every day.  An ounce-equivalent is 1 slice of bread, 1 cup of ready-to-eat cereal, or ½ cup of cooked rice, cooked pasta, or cooked cereal. 
? 2½ cups of vegetables, especially: § Dark-green vegetables such as broccoli and spinach. § Orange vegetables such as carrots and sweet potatoes. § Dry beans (such as pelayo and kidney beans) and peas (such as lentils). ? 2 cups of fresh, frozen, or canned fruit. A small apple or 1 banana or orange equals 1 cup. ? 3 cups of nonfat or low-fat milk, yogurt, or other milk products. ? 5½ ounces of meat and beans, such as chicken, fish, lean meat, beans, nuts, and seeds. One egg, 1 tablespoon of peanut butter, ½ ounce nuts or seeds, or ¼ cup of cooked beans equals 1 ounce of meat. · Learn how to read food labels for serving sizes and ingredients. Fast-food and convenience-food meals often contain few or no fruits or vegetables. Make sure you eat some fruits and vegetables to make the meal more nutritious. · Look at your food diary. For each food group, add up what you have eaten and then divide the total by the number of days. This will give you an idea of how much you are eating from each food group. See if you can find some ways to change your diet to make it more healthy. Start small · Do not try to make dramatic changes to your diet all at once. You might feel that you are missing out on your favorite foods and then be more likely to fail. · Start slowly, and gradually change your habits. Try some of the following: ? Use whole wheat bread instead of white bread. ? Use nonfat or low-fat milk instead of whole milk. ? Eat brown rice instead of white rice, and eat whole wheat pasta instead of white-flour pasta. ? Try low-fat cheeses and low-fat yogurt. ? Add more fruits and vegetables to meals and have them for snacks. ? Add lettuce, tomato, cucumber, and onion to sandwiches. ? Add fruit to yogurt and cereal. 
Enjoy food · You can still eat your favorite foods.  You just may need to eat less of them. If your favorite foods are high in fat, salt, and sugar, limit how often you eat them, but do not cut them out entirely. · Eat a wide variety of foods. Make healthy choices when eating out · The type of restaurant you choose can help you make healthy choices. Even fast-food chains are now offering more low-fat or healthier choices on the menu. · Choose smaller portions, or take half of your meal home. · When eating out, try: ? A veggie pizza with a whole wheat crust or grilled chicken (instead of sausage or pepperoni). ? Pasta with roasted vegetables, grilled chicken, or marinara sauce instead of cream sauce. ? A vegetable wrap or grilled chicken wrap. ? Broiled or poached food instead of fried or breaded items. Make healthy choices easy · Buy packaged, prewashed, ready-to-eat fresh vegetables and fruits, such as baby carrots, salad mixes, and chopped or shredded broccoli and cauliflower. · Buy packaged, presliced fruits, such as melon or pineapple. · Choose 100% fruit or vegetable juice instead of soda. Limit juice intake to 4 to 6 oz (½ to ¾ cup) a day. · Blend low-fat yogurt, fruit juice, and canned or frozen fruit to make a smoothie for breakfast or a snack. Where can you learn more? Go to http://kosta-michelle.info/. Enter T756 in the search box to learn more about \"Eating Healthy Foods: Care Instructions. \" Current as of: March 29, 2018 Content Version: 11.8 © 3499-9661 [employer]. Care instructions adapted under license by [employer] (which disclaims liability or warranty for this information). If you have questions about a medical condition or this instruction, always ask your healthcare professional. [address] any warranty or liability for your use of this information.

## 2018-11-21 NOTE — PROGRESS NOTES
HISTORY OF PRESENT ILLNESS Xena Charles is a 57 y.o. female. HPI: Here as a new patient to get establish care. Multiple medical problem. Mentioned that she is having generalize pain from upper back to lower back and all over the body along with upper ext pain since last 3 wks. Denies any fall or injury. Denies any lifting or banding. Said having upper ext numbness and tingling. 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 Denies any headache or dizziness. No nausea or vomiting. No vision changes. No ext weakness. No abdominal pain. No bowel complains. Taking otc medication and helping some. Also c/o urinary urgency and frequency going on since long time. Has family history of diabetes. Denies any dysuria. No abdominal pain. No fever. No nausea or vomiting. Having some whitish vaginal discharge. Noted since couple of days. Had hysterectomy but not sure it is partial of complete. She has cervix or not. Rannie Satchel it was done for heavy bleeding and benign cause. Not sexually active at this time. Prior h/o chlamydia couple of years ago. No pelvic pain. Has some odor. C/o rt side breast pain. Gets worse with any rt upper ext movement. Over due for mammogram as she has no insurance. Dose self breast exam and noted no palpable lump. No skin or nipple changes per her. Asking for mammogram order. Prior h/o abdominal surgery due to intestinal perforation post rt ovarian cyst removal.  
Visit Vitals /78 (BP 1 Location: Left arm, BP Patient Position: Sitting) Pulse 75 Temp 98.2 °F (36.8 °C) (Oral) Resp 16 Ht 5' 5\" (1.651 m) Wt 181 lb (82.1 kg) SpO2 98% BMI 30.12 kg/m² Review medication list, vitals, problem list,allergies. ROS: see HPI + no chest pain or sob. No vision changes. No cold or cough. No ext weakness, no unusual fatigue. Sleep is fair. Mood has been stable. Physical Exam  
Constitutional: She is oriented to person, place, and time. No distress. Neck: No thyromegaly present. Cardiovascular: Normal heart sounds. Pulmonary/Chest: No respiratory distress. She has no wheezes. Abdominal: Soft. There is no tenderness. Genitourinary:  
Genitourinary Comments: Pelvic exam. : whitish thick vaginal discharge noted. Absence of uterus and cervix. No palpable adnexal mass palpable. Breast exam: bilateral generalize tenderness on clinical exam.  
Rt breast: maximum tenderness over 9 o clock position. No palpable lump. No skin or nipple changes. No palpable axillary lymph nodes Left: generalize tenderness. No skin or nipple changes. No nipple discharge. No axillary lymph node palpable. Musculoskeletal: She exhibits no edema. Generalize discomfort over bilateral paraspinal discomfort from upper to lower back. No cervical, thoracic , lumbar or sacral spine point tenderness. ROM over neck wnl. Generalize discomfort / tenderness over bilateral shoulder. Left more than rt. Upper ext tone and power bilaterally equal.  
Generalize tingling sensations over both upper ext. Lymphadenopathy:  
  She has no cervical adenopathy. Neurological: She is oriented to person, place, and time. Psychiatric: Her behavior is normal.  
 
 
ASSESSMENT and PLAN 
  ICD-10-CM ICD-9-CM 1. Pain of multiple sites: for now checking labs. Advised symptomatic treatment with heating pad. NSAID as needed otc with food. U12 019.72 METABOLIC PANEL, COMPREHENSIVE  
   CBC W/O DIFF 2. Numbness and tingling of hand: obtain EMG.  R20.0 782. 0 EMG TWO EXTREMITIES UPPER  
 R20.2 3. Vaginal discharge: done vaginal exam in presence of nurse Jacinda. Given diflucan . Sending nuswab plus per her request.  N89.8 623.5 NUSWAB VAGINITIS  
   fluconazole (DIFLUCAN) 150 mg tablet 4. Overactive bladder: for now given oxybutynin. Discussed medication side effects. K04.46 812.99 METABOLIC PANEL, COMPREHENSIVE 5.  Screening for diabetes mellitus Z13.1 V77.1 HEMOGLOBIN A1C WITH EAG  
 6. Screening for hyperlipidemia Z13.220 V77.91 LIPID PANEL  
7. H/O abdominal surgery Z98.890 V45.89   
 emergency laprotomy for bowel perforation as a complication of her procedure rt ovarian cyst removal   
8. BMI 30.0-30.9,adult: discussed high BMI. Discussed diet modification, calorie count and exercise. Discussed importance of weight loss. agree to do exercise and life style modification. Diet and exercise hand out given in AVS. Z68.30 V85.30   
9. Breast pain: ordered diagnostic mammogram.  N64.4 611.71 [facility] MAMMO BI DX INCL CAD  
 rt more than left Pt understood and agree with the plan Follow-up Disposition: 
Return in about 1 month (around 12/21/2018).

## 2018-11-21 NOTE — PROGRESS NOTES
Chief Complaint Patient presents with [facility] Establish Care  Numbness  
  bilateral arms and hands  Breast pain  
  right  Knee Pain Bilateral  
 Vaginal Discharge  
  w/odor- started yesterday

## 2018-11-24 ENCOUNTER — HOSPITAL ENCOUNTER (EMERGENCY)
Age: 48
Discharge: HOME | End: 2018-11-24
Attending: EMERGENCY MEDICINE
Payer: MEDICAID

## 2018-11-24 VITALS
BODY MASS INDEX: 28.66 KG/M2 | HEART RATE: 94 BPM | WEIGHT: 172 LBS | SYSTOLIC BLOOD PRESSURE: 123 MMHG | DIASTOLIC BLOOD PRESSURE: 81 MMHG | HEIGHT: 65 IN | RESPIRATION RATE: 16 BRPM | OXYGEN SATURATION: 99 %

## 2018-11-24 DIAGNOSIS — S61.210A LACERATION OF RIGHT INDEX FINGER WITHOUT FOREIGN BODY WITHOUT DAMAGE TO NAIL, INITIAL ENCOUNTER: ICD-10-CM
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
## 2018-11-24 DIAGNOSIS — S61.259A HUMAN BITE OF FINGER, INITIAL ENCOUNTER: Primary | ICD-10-CM

## 2018-11-24 DIAGNOSIS — M79.644 FINGER PAIN, RIGHT: ICD-10-CM

## 2018-11-24 PROCEDURE — 99282 EMERGENCY DEPT VISIT SF MDM: CPT

## 2018-11-24 PROCEDURE — 74011250636 HC RX REV CODE- 250/636: Performed by: PHYSICIAN ASSISTANT

## 2018-11-24 PROCEDURE — 90471 IMMUNIZATION ADMIN: CPT

## 2018-11-24 PROCEDURE — 90715 TDAP VACCINE 7 YRS/> IM: CPT | Performed by: PHYSICIAN ASSISTANT

## 2018-11-24 RX ORDER — AMOXICILLIN AND CLAVULANATE POTASSIUM 875; 125 MG/1; MG/1
1 TABLET, FILM COATED ORAL 2 TIMES DAILY
Qty: 14 TAB | Refills: 0 | Status: SHIPPED | OUTPATIENT
Start: 2018-11-24 | End: 2018-12-01

## 2018-11-24 RX ORDER — IBUPROFEN 600 MG/1
600 TABLET ORAL
Qty: 20 TAB | Refills: 0 | Status: SHIPPED | OUTPATIENT
Start: 2018-11-24 | End: 2018-12-16

## 2018-11-24 RX ADMIN — TETANUS TOXOID, REDUCED DIPHTHERIA TOXOID AND ACELLULAR PERTUSSIS VACCINE, ADSORBED 0.5 ML: 5; 2.5; 8; 8; 2.5 SUSPENSION INTRAMUSCULAR at 21:43

## 2018-11-25 NOTE — ED NOTES
Pt discharged to home. Wound care instruction provided to pt by PA; instructed to follow up with her PCP for wound check but to return earlier for worsening pain/redness/swelling/purulent drainage/other concerns.

## 2018-11-25 NOTE — ED NOTES
Pt sitting comfortably on bedside with betadine soak to right finger. Affect calm/conversant, respirations regular/non labored/utilizing cellular phone without difficulty. No distress noted.

## 2018-11-25 NOTE — DISCHARGE INSTRUCTIONS
Human Bites: Care Instructions  Your Care Instructions  The biggest danger from a human bite is that it might get infected. Usually the wound will not be stitched. Taking good care of your wound at home will help it heal and reduce your chance of infection. Your doctor may give you antibiotics to prevent infection and a tetanus shot if you have not had one in the last 5 years or do not know when you had your last one. Your wound may heal in less than a week, or it may take longer, depending on how bad it is. The larger it is, the longer it will take to heal.  The doctor has checked you carefully, but problems can develop later. If you notice any problems or new symptoms, get medical treatment right away. Follow-up care is a key part of your treatment and safety. Be sure to make and go to all appointments, and call your doctor if you are having problems. It's also a good idea to know your test results and keep a list of the medicines you take. How can you care for yourself at home? · If your doctor told you how to care for your wound, follow your doctor's instructions. If you did not get instructions, follow this general advice:  ? Wash the wound with clean water 2 times a day. Don't use hydrogen peroxide or alcohol, which can slow healing. ? You may cover the wound with a thin layer of petroleum jelly, such as Vaseline, and a nonstick bandage. ? Apply more petroleum jelly and replace the bandage as needed. · Your wound may itch or feel irritated. A little redness and swelling are normal. Do not scratch or rub the wound. · If your doctor prescribed antibiotics, take them as directed. Do not stop taking them just because you feel better. You need to take the full course of antibiotics. · Ask your doctor if you can take an over-the-counter pain medicine. When should you call for help?   Call your doctor now or seek immediate medical care if:    · The skin near the bite turns cold or pale or it changes color.     · You lose feeling in the area near the bite, or it feels numb or tingly.     · You have trouble moving a limb near the bite.     · You have symptoms of infection, such as:  ? Increased pain, swelling, warmth, or redness near the wound. ? Red streaks leading from the wound. ? Pus draining from the wound. ? A fever.     · Blood soaks through the bandage. Oozing small amounts of blood is normal.     · Your pain is getting worse.    Watch closely for changes in your health, and be sure to contact your doctor if you are not getting better as expected. Where can you learn more? Go to http://kosta-michelle.info/. Enter N269 in the search box to learn more about \"Human Bites: Care Instructions. \"  Current as of: November 20, 2017  Content Version: 11.8  © 2605-7919 [employer]. Care instructions adapted under license by [employer] (which disclaims liability or warranty for this information). If you have questions about a medical condition or this instruction, always ask your healthcare professional. [address] any warranty or liability for your use of this information.
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
## 2018-11-27 DIAGNOSIS — B96.89 BV (BACTERIAL VAGINOSIS): Primary | ICD-10-CM

## 2018-11-27 DIAGNOSIS — N76.0 BV (BACTERIAL VAGINOSIS): Primary | ICD-10-CM

## 2018-11-27 DIAGNOSIS — N89.8 VAGINAL DISCHARGE: ICD-10-CM

## 2018-11-27 LAB
A VAGINAE DNA VAG QL NAA+PROBE: ABNORMAL SCORE
BVAB2 DNA VAG QL NAA+PROBE: ABNORMAL SCORE
C ALBICANS DNA VAG QL NAA+PROBE: NEGATIVE
C GLABRATA DNA VAG QL NAA+PROBE: NEGATIVE
MEGA1 DNA VAG QL NAA+PROBE: ABNORMAL SCORE
T VAGINALIS RRNA SPEC QL NAA+PROBE: NEGATIVE

## 2018-11-27 RX ORDER — METRONIDAZOLE 500 MG/1
500 TABLET ORAL 2 TIMES DAILY
Qty: 14 TAB | Refills: 0 | Status: SHIPPED | OUTPATIENT
Start: 2018-11-27 | End: 2018-12-04

## 2018-11-27 NOTE — PROGRESS NOTES
Contacted patient and verified identity using name and date of birth (2- identifiers) Spoke with patient and she verbalized understanding of BV and need for Flagyl and to avoid alcohol while taking Flagyl.

## 2018-11-29 RX ORDER — FLUCONAZOLE 150 MG/1
TABLET ORAL
Qty: 1 TAB | Refills: 0 | Status: SHIPPED | OUTPATIENT
Start: 2018-11-29 | End: 2018-12-16

## 2018-12-07 ENCOUNTER — HOSPITAL ENCOUNTER (OUTPATIENT)
Dept: LAB | Age: 48
Discharge: HOME | End: 2018-12-07

## 2018-12-07 LAB — XX-LABCORP SPECIMEN COL,LCBCF: NORMAL

## 2018-12-07 PROCEDURE — 99001 SPECIMEN HANDLING PT-LAB: CPT

## 2018-12-08 LAB
ALBUMIN SERPL-MCNC: 4.5 G/DL (ref 3.5–5.5)
ALBUMIN/GLOB SERPL: 1.6 {RATIO} (ref 1.2–2.2)
ALP SERPL-CCNC: 95 IU/L (ref 39–117)
ALT SERPL-CCNC: 16 IU/L (ref 0–32)
AST SERPL-CCNC: 23 IU/L (ref 0–40)
BILIRUB SERPL-MCNC: 0.3 MG/DL (ref 0–1.2)
BUN SERPL-MCNC: 10 MG/DL (ref 6–24)
BUN/CREAT SERPL: 13 (ref 9–23)
CALCIUM SERPL-MCNC: 9.7 MG/DL (ref 8.7–10.2)
CHLORIDE SERPL-SCNC: 104 MMOL/L (ref 96–106)
CHOLEST SERPL-MCNC: 176 MG/DL (ref 100–199)
CO2 SERPL-SCNC: 24 MMOL/L (ref 20–29)
CREAT SERPL-MCNC: 0.79 MG/DL (ref 0.57–1)
ERYTHROCYTE [DISTWIDTH] IN BLOOD BY AUTOMATED COUNT: 13 % (ref 12.3–15.4)
EST. AVERAGE GLUCOSE BLD GHB EST-MCNC: 105 MG/DL
GLOBULIN SER CALC-MCNC: 2.8 G/DL (ref 1.5–4.5)
GLUCOSE SERPL-MCNC: 93 MG/DL (ref 65–99)
HBA1C MFR BLD: 5.3 % (ref 4.8–5.6)
HCT VFR BLD AUTO: 33.6 % (ref 34–46.6)
HDLC SERPL-MCNC: 59 MG/DL
HGB BLD-MCNC: 10.6 G/DL (ref 11.1–15.9)
INTERPRETATION, 910389: NORMAL
LDLC SERPL CALC-MCNC: 97 MG/DL (ref 0–99)
MCH RBC QN AUTO: 27.3 PG (ref 26.6–33)
MCHC RBC AUTO-ENTMCNC: 31.5 G/DL (ref 31.5–35.7)
MCV RBC AUTO: 87 FL (ref 79–97)
PLATELET # BLD AUTO: 323 X10E3/UL (ref 150–379)
POTASSIUM SERPL-SCNC: 4.2 MMOL/L (ref 3.5–5.2)
PROT SERPL-MCNC: 7.3 G/DL (ref 6–8.5)
RBC # BLD AUTO: 3.88 X10E6/UL (ref 3.77–5.28)
SODIUM SERPL-SCNC: 141 MMOL/L (ref 134–144)
SPECIMEN STATUS REPORT, ROLRST: NORMAL
TRIGL SERPL-MCNC: 99 MG/DL (ref 0–149)
VLDLC SERPL CALC-MCNC: 20 MG/DL (ref 5–40)
WBC # BLD AUTO: 7 X10E3/UL (ref 3.4–10.8)

## 2018-12-10 NOTE — PROGRESS NOTES
Let pt know that lab showed anemia. For now iron rich diet and multivitamin. Further discussion on follow up visit.

## 2018-12-12 ENCOUNTER — TELEPHONE (OUTPATIENT)
Dept: FAMILY MEDICINE CLINIC | Age: 48
End: 2018-12-12

## 2018-12-12 NOTE — PROGRESS NOTES
Contacted patient and verified identity using name and date of birth (2- identifiers)  Spoke with patient and she verbalized understanding of anemia and need for iron rich diet and MVI.

## 2018-12-12 NOTE — TELEPHONE ENCOUNTER
Notes recorded by Umer Wharton LPN on 27/83/8048 at 1:18 PM EST  Contacted patient and verified identity using name and date of birth (2- identifiers)  Spoke with patient and she verbalized understanding of anemia and need for iron rich diet and MVI.

## 2018-12-12 NOTE — TELEPHONE ENCOUNTER
Pt states that she was returning a call to the nurse and left a different number to call her back on [phone].  Thank you

## 2018-12-16 ENCOUNTER — HOSPITAL ENCOUNTER (EMERGENCY)
Age: 48
Discharge: HOME | End: 2018-12-16
Attending: EMERGENCY MEDICINE
Payer: MEDICAID

## 2018-12-16 ENCOUNTER — APPOINTMENT (OUTPATIENT)
Dept: GENERAL RADIOLOGY | Age: 48
End: 2018-12-16
Attending: PHYSICIAN ASSISTANT
Payer: MEDICAID

## 2018-12-16 VITALS
RESPIRATION RATE: 16 BRPM | OXYGEN SATURATION: 100 % | HEART RATE: 86 BPM | TEMPERATURE: 98.7 F | DIASTOLIC BLOOD PRESSURE: 81 MMHG | SYSTOLIC BLOOD PRESSURE: 136 MMHG | BODY MASS INDEX: 29.37 KG/M2 | HEIGHT: 64 IN | WEIGHT: 172 LBS

## 2018-12-16 DIAGNOSIS — M54.10 RADICULOPATHY, UNSPECIFIED SPINAL REGION: Primary | ICD-10-CM

## 2018-12-16 DIAGNOSIS — M47.22 OSTEOARTHRITIS OF SPINE WITH RADICULOPATHY, CERVICAL REGION: ICD-10-CM

## 2018-12-16 PROCEDURE — 72050 X-RAY EXAM NECK SPINE 4/5VWS: CPT

## 2018-12-16 PROCEDURE — 99282 EMERGENCY DEPT VISIT SF MDM: CPT

## 2018-12-16 RX ORDER — CYCLOBENZAPRINE HCL 5 MG
10 TABLET ORAL 3 TIMES DAILY
Qty: 9 TAB | Refills: 0 | Status: SHIPPED | OUTPATIENT
Start: 2018-12-16 | End: 2019-05-10

## 2018-12-16 RX ORDER — PREDNISONE 20 MG/1
60 TABLET ORAL DAILY
Qty: 15 TAB | Refills: 0 | Status: SHIPPED | OUTPATIENT
Start: 2018-12-16 | End: 2018-12-21

## 2018-12-16 NOTE — ED TRIAGE NOTES
Pt reports bilateral arm pain that radiates down both arms x 2 months. States begins in her neck and extends down to her fingers. [facility] has had blood work with negative results. [facility] has not had x rays done. Patient presents in NAD.

## 2018-12-16 NOTE — DISCHARGE INSTRUCTIONS
Back Pain: Care Instructions  Your Care Instructions    Back pain has many possible causes. It is often related to problems with muscles and ligaments of the back. It may also be related to problems with the nerves, discs, or bones of the back. Moving, lifting, standing, sitting, or sleeping in an awkward way can strain the back. Sometimes you don't notice the injury until later. Arthritis is another common cause of back pain. Although it may hurt a lot, back pain usually improves on its own within several weeks. Most people recover in 12 weeks or less. Using good home treatment and being careful not to stress your back can help you feel better sooner. Follow-up care is a key part of your treatment and safety. Be sure to make and go to all appointments, and call your doctor if you are having problems. It's also a good idea to know your test results and keep a list of the medicines you take. How can you care for yourself at home? · Sit or lie in positions that are most comfortable and reduce your pain. Try one of these positions when you lie down:  ? Lie on your back with your knees bent and supported by large pillows. ? Lie on the floor with your legs on the seat of a sofa or chair. ? Lie on your side with your knees and hips bent and a pillow between your legs. ? Lie on your stomach if it does not make pain worse. · Do not sit up in bed, and avoid soft couches and twisted positions. Bed rest can help relieve pain at first, but it delays healing. Avoid bed rest after the first day of back pain. · Change positions every 30 minutes. If you must sit for long periods of time, take breaks from sitting. Get up and walk around, or lie in a comfortable position. · Try using a heating pad on a low or medium setting for 15 to 20 minutes every 2 or 3 hours. Try a warm shower in place of one session with the heating pad. · You can also try an ice pack for 10 to 15 minutes every 2 to 3 hours.  Put a thin cloth between the ice pack and your skin. · Take pain medicines exactly as directed. ? If the doctor gave you a prescription medicine for pain, take it as prescribed. ? If you are not taking a prescription pain medicine, ask your doctor if you can take an over-the-counter medicine. · Take short walks several times a day. You can start with 5 to 10 minutes, 3 or 4 times a day, and work up to longer walks. Walk on level surfaces and avoid hills and stairs until your back is better. · Return to work and other activities as soon as you can. Continued rest without activity is usually not good for your back. · To prevent future back pain, do exercises to stretch and strengthen your back and stomach. Learn how to use good posture, safe lifting techniques, and proper body mechanics. When should you call for help? Call your doctor now or seek immediate medical care if:    · You have new or worsening numbness in your legs.     · You have new or worsening weakness in your legs. (This could make it hard to stand up.)     · You lose control of your bladder or bowels.    Watch closely for changes in your health, and be sure to contact your doctor if:    · You have a fever, lose weight, or don't feel well.     · You do not get better as expected. Where can you learn more? Go to http://kosta-michelle.info/. Enter Y278 in the search box to learn more about \"Back Pain: Care Instructions. \"  Current as of: November 29, 2017  Content Version: 11.8  © 7910-0226 [employer]. Care instructions adapted under license by [employer] (which disclaims liability or warranty for this information). If you have questions about a medical condition or this instruction, always ask your healthcare professional. [place] [address] any warranty or liability for your use of this information.            Pinched Nerve in the Neck: Care Instructions  Your Care Instructions  A pinched nerve in the neck happens when a vertebra or disc in the upper part of your spine is damaged. This damage can happen because of an injury. Or it can just happen with age. The changes caused by the damage may put pressure on a nearby nerve root, pinching it. This causes symptoms such as sharp pain in your neck, shoulder, arm, hand, or back. You may also have tingling or numbness. Sometimes it makes your arm weaker. The symptoms are usually worse when you turn your head or strain your neck. For many people, the symptoms get better over time and finally go away. Early treatment usually includes medicines for pain and swelling. Sometimes physical therapy and special exercises may help. Follow-up care is a key part of your treatment and safety. Be sure to make and go to all appointments, and call your doctor if you are having problems. It's also a good idea to know your test results and keep a list of the medicines you take. How can you care for yourself at home? · Be safe with medicines. Read and follow all instructions on the label. ¨ If the doctor gave you a prescription medicine for pain, take it as prescribed. ¨ If you are not taking a prescription pain medicine, ask your doctor if you can take an over-the-counter medicine. · Try using a heating pad on a low or medium setting for 15 to 20 minutes every 2 or 3 hours. Try a warm shower in place of one session with the heating pad. You can also buy single-use heat wraps that last up to 8 hours. · You can also try an ice pack for 10 to 15 minutes every 2 to 3 hours. There isn't strong evidence that either heat or ice will help. But you can try them to see if they help you. · Don't spend too long in one position. Take short breaks to move around and change positions. · Wear a seat belt and shoulder harness when you are in a car. · Sleep with a pillow under your head and neck that keeps your neck straight.   · If you were given a neck brace (cervical collar) to limit neck motion, wear it as instructed for as many days as your doctor tells you to. Do not wear it longer than you were told to. Wearing a brace for too long can lead to neck stiffness and can weaken the neck muscles. · Follow your doctor's instructions for gentle neck-stretching exercises. · Do not smoke. Smoking can slow healing of your discs. If you need help quitting, talk to your doctor about stop-smoking programs and medicines. These can increase your chances of quitting for good. · Avoid strenuous work or exercise until your doctor says it is okay. When should you call for help? Call 911 anytime you think you may need emergency care. For example, call if:  ? · You are unable to move an arm or a leg at all. ?Call your doctor now or seek immediate medical care if:  ? · You have new or worse symptoms in your arms, legs, chest, belly, or buttocks. Symptoms may include:  ¨ Numbness or tingling. ¨ Weakness. ¨ Pain. ? · You lose bladder or bowel control. ? Watch closely for changes in your health, and be sure to contact your doctor if:  ? · You are not getting better as expected. Where can you learn more? Go to http://kosta-michelle.info/. Enter N142 in the search box to learn more about \"Pinched Nerve in the Neck: Care Instructions. \"  Current as of: March 21, 2017  Content Version: 11.5  © 3459-0921 [employer]. Care instructions adapted under license by [employer] (which disclaims liability or warranty for this information). If you have questions about a medical condition or this instruction, always ask your healthcare professional. [place] [address] any warranty or liability for your use of this information.

## 2018-12-16 NOTE — ED NOTES
I have reviewed discharge instructions with the patient. The patient verbalized understanding. Current Discharge Medication List      START taking these medications    Details   cyclobenzaprine (FLEXERIL) 5 mg tablet Take 2 Tabs by mouth three (3) times daily. Qty: 9 Tab, Refills: 0      predniSONE (DELTASONE) 20 mg tablet Take 60 mg by mouth daily for 5 days.  With Breakfast  Qty: 15 Tab, Refills: 0         Patient armband removed and shredded

## 2018-12-16 NOTE — ED PROVIDER NOTES
Pt here with radiating pain down both arms for past few weeks. States she is off today so wants this evaluated. No pain at present. Pain comes from neck down to fingers and hands. No numbness, no weakness. Arm Pain    This is a new problem. Episode onset: weeks. The problem occurs constantly. The problem has not changed since onset. Pain location: bilateral arms. The pain is moderate. Associated symptoms include tingling and neck pain. Pertinent negatives include no numbness, full range of motion, no stiffness, no itching and no back pain. The symptoms are aggravated by movement, activity and palpation. She has tried nothing for the symptoms. There has been no history of extremity trauma.         Past Medical History:   Diagnosis Date    Anemia     Arthritis     Fibroids     uterine fibroids       Past Surgical History:   Procedure Laterality Date    HX HYSTERECTOMY      HX OTHER SURGICAL      surgery for punctured intestine    HX OVARIAN CYST REMOVAL Bilateral     HX TUBAL LIGATION           Family History:   Problem Relation Age of Onset    Hypertension Mother     Diabetes Mother        Social History     Socioeconomic History    Marital status: SINGLE     Spouse name: Not on file    Number of children: Not on file    Years of education: Not on file    Highest education level: Not on file   Social Needs    Financial resource strain: Not on file    Food insecurity - worry: Not on file    Food insecurity - inability: Not on file   [place] needs - medical: Not on file   [place] needs - non-medical: Not on file   Occupational History    Not on file   Tobacco Use    Smoking status: Never Smoker    Smokeless tobacco: Never Used   Substance and Sexual Activity    Alcohol use: No    Drug use: No    Sexual activity: Not Currently     Partners: Male   Other Topics Concern    Not on file   Social History Narrative    Not on file         ALLERGIES: Patient has no known allergies. Review of Systems   Constitutional: Negative for chills and fever. HENT: Negative. Eyes: Negative. Respiratory: Negative for cough and shortness of breath. Cardiovascular: Negative for chest pain and palpitations. Gastrointestinal: Negative for abdominal pain, constipation, diarrhea, nausea and vomiting. Genitourinary: Negative. Musculoskeletal: Positive for neck pain. Negative for back pain and stiffness. Skin: Negative. Negative for itching. Allergic/Immunologic: Negative. Neurological: Positive for tingling. Negative for dizziness, weakness, numbness and headaches. Psychiatric/Behavioral: Negative. All other systems reviewed and are negative. Vitals:    12/16/18 1259   BP: 136/81   Pulse: 86   Resp: 16   Temp: 98.7 °F (37.1 °C)   SpO2: 100%   Weight: 78 kg (172 lb)   Height: 5' 4\" (1.626 m)            Physical Exam   Constitutional: She is oriented to person, place, and time. She appears well-developed and well-nourished. No distress. NAD, well hydrated, non toxic     HENT:   Head: Normocephalic and atraumatic. Nose: Nose normal.   Mouth/Throat: Oropharynx is clear and moist. No oropharyngeal exudate. Eyes: Conjunctivae and EOM are normal. Pupils are equal, round, and reactive to light. Neck: Normal range of motion. Neck supple. Cardiovascular: Normal rate, regular rhythm and normal heart sounds. No murmur heard. Pulmonary/Chest: Effort normal and breath sounds normal. No respiratory distress. She has no wheezes. She has no rales. Abdominal: Soft. She exhibits no distension. There is no tenderness. There is no guarding. Musculoskeletal: Normal range of motion. Non tender to midline palpation of the cervical, thoracic, and lumbar spine. No step off or deformity. FROM of BUE against resistance in flexion and extension with 5/5 strength. Non tender to bilateral shoulders/elbows/hands.   Pulses intact and equal.       Lymphadenopathy:     She has no cervical adenopathy. Neurological: She is alert and oriented to person, place, and time. No cranial nerve deficit. Coordination normal.   Skin: Skin is warm. No rash noted. She is not diaphoretic. Psychiatric: She has a normal mood and affect. Her behavior is normal.   Nursing note and vitals reviewed. MDM  Number of Diagnoses or Management Options  Osteoarthritis of spine with radiculopathy, cervical region:   Radiculopathy, unspecified spinal region:   Diagnosis management comments: 55yo F with bilateral UE tingling, pain shooting from neck to fingers and hands. Pain worse at times, worse with ROM. Some neck pain, no trauma. No midline neck tenderness, no neuro deficits. Xray of C-spine c/w DJD which most likely is causing radiculopathy.  WIll treat with NSAIDS, steroids, pain control referral to ortho          Procedures

## 2018-12-28 ENCOUNTER — HOSPITAL ENCOUNTER (OUTPATIENT)
Dept: MAMMOGRAPHY | Age: 48
Discharge: HOME | End: 2018-12-28
Attending: FAMILY MEDICINE
Payer: MEDICAID

## 2018-12-28 ENCOUNTER — HOSPITAL ENCOUNTER (OUTPATIENT)
Dept: ULTRASOUND IMAGING | Age: 48
Discharge: HOME | End: 2018-12-28
Attending: FAMILY MEDICINE
Payer: MEDICAID

## 2018-12-28 DIAGNOSIS — N64.4 BREAST PAIN: ICD-10-CM

## 2018-12-28 PROCEDURE — 76642 ULTRASOUND BREAST LIMITED: CPT

## 2018-12-28 PROCEDURE — 77066 DX MAMMO INCL CAD BI: CPT

## 2019-01-03 NOTE — PROGRESS NOTES
Contacted patient and verified identity using name and date of birth (2- identifiers)  SPoke with patient and she verbalized understanding of normal mammogram and to repeat annually.

## 2019-01-03 NOTE — PROGRESS NOTES
Let pt know that ultrasound result showed no evidence of malignancy and recommended routine follow up which would be yearly mammogram. Thanks.

## 2019-01-03 NOTE — PROGRESS NOTES
Contacted patient and verified identity using name and date of birth (2- identifiers)  Spoke with patient and she verbalized understanding of annual mammogram

## 2019-01-04 ENCOUNTER — OFFICE VISIT (OUTPATIENT)
Dept: ORTHOPEDIC SURGERY | Age: 49
End: 2019-01-04

## 2019-01-04 VITALS
WEIGHT: 176.8 LBS | HEIGHT: 64 IN | OXYGEN SATURATION: 96 % | TEMPERATURE: 96.5 F | HEART RATE: 77 BPM | SYSTOLIC BLOOD PRESSURE: 111 MMHG | DIASTOLIC BLOOD PRESSURE: 64 MMHG | RESPIRATION RATE: 16 BRPM | BODY MASS INDEX: 30.19 KG/M2

## 2019-01-04 DIAGNOSIS — M54.12 CERVICAL NEURITIS: ICD-10-CM

## 2019-01-04 DIAGNOSIS — M47.812 CERVICAL SPONDYLOSIS WITHOUT MYELOPATHY: ICD-10-CM

## 2019-01-04 DIAGNOSIS — M50.30 DDD (DEGENERATIVE DISC DISEASE), CERVICAL: Primary | ICD-10-CM

## 2019-01-04 RX ORDER — TOPIRAMATE 25 MG/1
TABLET ORAL
Qty: 90 TAB | Refills: 1 | Status: SHIPPED | OUTPATIENT
Start: 2019-01-04 | End: 2019-01-04 | Stop reason: CLARIF

## 2019-01-04 RX ORDER — TOPIRAMATE 25 MG/1
TABLET ORAL
Qty: 90 TAB | Refills: 1 | Status: SHIPPED | OUTPATIENT
Start: 2019-01-04 | End: 2019-05-10

## 2019-01-04 NOTE — PROGRESS NOTES
[facility]  [address], [address]  Phone: [phone]  Fax: [phone]        INITIAL CONSULTATION      HISTORY OF PRESENT ILLNESS:  Aldair Pardo is a 57 y.o. female whom is self-referred secondary to primary complaint of progressive neck pain extending into the bilateral shoulders and the BUE (R=L) with paraesthesias to all digits in the bilateral hands since October 2018. She really has widespread pain complaints. She rates her pain 10/10. Pt is accompanied by her mother today. She denies specific injury or trauma. Her pain is not worsened positionally. She denies a hx of cervical surgery, injections, or PT. She has treated with MDP and Flexeril without relief. Pt denies dropping things or loss of balance. Patient denies history of glaucoma. She denies possibility of pregnancy or breastfeeding (hx pf tubal ligation). Pt denies fever, weight loss, or skin changes. ER note from [facility], [address] dated 12/16/18 indicating patient presented for neck pain into the BUE to the hands x a few weeks. She was neurologically intact, and no treatment was noted. Cervical spine XR dated 12/16/18 reviewed. Per report, mild to moderate DJD at C5-C6 and C6-C7. The patient is RHD.  reviewed. Body mass index is 30.35 kg/m². PCP: Angelica Brown MD    Past Medical History:   Diagnosis Date    Anemia     Arthritis     Fibroids     uterine fibroids        Past Surgical History:   Procedure Laterality Date    HX HYSTERECTOMY      HX OTHER SURGICAL      surgery for punctured intestine    HX OVARIAN CYST REMOVAL Bilateral     HX TUBAL LIGATION         Social History     Tobacco Use    Smoking status: Never Smoker    Smokeless tobacco: Never Used   Substance Use Topics    Alcohol use: No     Work status: The patient is not employed. Marital status: The patient is single.      Current Outpatient Medications   Medication Sig Dispense Refill    cyclobenzaprine (FLEXERIL) 5 mg tablet Take 2 Tabs by mouth three (3) times daily. 9 Tab 0       No Known Allergies       Family History   Problem Relation Age of Onset    Hypertension Mother     Diabetes Mother     Breast Cancer Maternal Aunt          REVIEW OF SYSTEMS  Constitutional symptoms: Negative  Eyes: Negative  Ears, Nose, Throat, and Mouth: Negative  Cardiovascular: Negative  Respiratory: Negative  Genitourinary: Negative  Integumentary (Skin and/or breast): Negative  Musculoskeletal: Positive for neck pain, BUE pain, widespread pain  Extremities: Negative for edema. Endocrine/Rheumatologic: Negative  Hematologic/Lymphatic: Negative  Allergic/Immunologic: Negative  Psychiatric: Negative       PHYSICAL EXAMINATION  Visit Vitals  /64   Pulse 77   Temp 96.5 °F (35.8 °C) (Oral)   Resp 16   Ht 5' 4\" (1.626 m)   Wt 176 lb 12.8 oz (80.2 kg)   LMP 01/15/2016   SpO2 96%   BMI 30.35 kg/m²       CONSTITUTIONAL: NAD, A&O x 3  HEART: Regular rate and rhythm  ABDOMEN: Positive bowel sounds, soft, nontender, and nondistended  LUNGS: Clear to auscultation bilaterally. SKIN: Negative for rash. RANGE OF MOTION: The patient has full passive range of motion in all four extremities. SENSATION: Sensation is intact to light touch throughout. MOTOR:   Straight Leg Raise: Negative, bilateral  Boss: Negative, bilateral  Tandem Gait: Neg. Deep tendon reflexes are 1+ at the brachioradialis, biceps, and 0 at the triceps bilaterally. Deep tendon reflexes are 2+ at the knees and ankles bilaterally. Shoulder AB/Flex Elbow Flex Wrist Ext Elbow Ext Wrist Flex Hand Intrin Tone   Right +4/5 +4/5 +4/5 +4/5 +4/5 +4/5 +4/5   Left +4/5 +4/5 +4/5 +4/5 +4/5 +4/5 +4/5              Hip Flex Knee Ext Knee Flex Ankle DF GTE Ankle PF Tone   Right +4/5 +4/5 +4/5 +4/5 +4/5 +4/5 +4/5   Left +4/5 +4/5 +4/5 +4/5 +4/5 +4/5 +4/5       ASSESSMENT   Diagnoses and all orders for this visit:    1. DDD (degenerative disc disease), cervical    2.  Cervical spondylosis without myelopathy    3. Cervical neuritis           IMPRESSIONS/RECOMMENDATIONS:  Patient presents with primary complaint of progressive neck pain extending into the bilateral shoulders and the BUE (R=L) with paraesthesias to all digits in the bilateral hands since October 2018. She really has widespread pain complaints. I will refer her to physical therapy with an emphasis on HEP. I will try her on Topamax 75 mg qhs. The risks, benefits, and potential side effects of this medication were discussed. Patient understands and wishes to proceed. Patient advised to call the office if intolerant to new medication. Patient is neurologically intact. Multiple treatment options were discussed. Patient is not interested in surgical intervention at this time. I will see the patient back in 1 month's time or earlier if needed. Written by Darlin Soliman, as dictated by Elva Dial MD  I examined the patient, reviewed and agree with the note.

## 2019-01-10 ENCOUNTER — HOSPITAL ENCOUNTER (OUTPATIENT)
Dept: PHYSICAL THERAPY | Age: 49
Discharge: HOME | End: 2019-01-10
Payer: MEDICAID

## 2019-01-10 PROCEDURE — 97530 THERAPEUTIC ACTIVITIES: CPT | Performed by: PHYSICAL THERAPIST

## 2019-01-10 PROCEDURE — 97110 THERAPEUTIC EXERCISES: CPT | Performed by: PHYSICAL THERAPIST

## 2019-01-10 PROCEDURE — 97162 PT EVAL MOD COMPLEX 30 MIN: CPT | Performed by: PHYSICAL THERAPIST

## 2019-01-10 NOTE — PROGRESS NOTES
PT DAILY TREATMENT NOTE 10-18 Patient Name: Thom Layton Date:1/10/2019 : 1970 [x]  Patient  Verified Payor: [employer]ICA[employer] / Plan: [address] [place] [address] / Product Type: Managed Care Medicaid / In time:310  Out time:344 Total Treatment Time (min): 34 Visit #: 1 of 8 Treatment Area: Neck pain [M54.2] Other cervical disc degeneration, unspecified cervical region [M50.30] Spondylosis without myelopathy or radiculopathy, cervical region [M47.812] SUBJECTIVE Pain Level (0-10 scale): 10 Any medication changes, allergies to medications, adverse drug reactions, diagnosis change, or new procedure performed?: [x] No    [] Yes (see summary sheet for update) Subjective functional status/changes:   [] No changes reported See eval. 
 
OBJECTIVE 10 min [x]Eval                  []Re-Eval  
 
 
14 min Therapeutic Exercise:  [] See flow sheet :  
Rationale: increase ROM, increase strength, improve coordination, improve balance and increase proprioception to improve the patients ability to tolerate daily activity with improved mobility/stability and reduced pain. 10 min Therapeutic Activity:  []  See flow sheet :  
Rationale: increase ROM, increase strength, improve coordination, improve balance and increase proprioception to improve the patients ability to tolerate daily activity with improved mobility/stability and reduced pain. With 
 [] TE 
 [] TA 
 [] neuro 
 [] other: Patient Education: [x] Review HEP [] Progressed/Changed HEP based on:  
[] positioning   [] body mechanics   [] transfers   [] heat/ice application   
[] other:   
 
Other Objective/Functional Measures: See eval.  
 
Pain Level (0-10 scale) post treatment: 6 
 
ASSESSMENT/Changes in Function:  
[x]  See Plan of Care Progress towards goals / Updated goals: 
Short Term Goals: To be accomplished in 2 weeks: 1. Pt to report [place] with HEP. Eval status: HEP issued and reviewed physically/verbally with pt Long Term Goals: To be accomplished in 4 weeks: 1. Pt to report score of 53 on FOTO, indicating improved tolerance to activity and reduced pain. Eval status: 32 on initial FOTO 2. Pt to report max level of pain <5/10 with activity, indicating reduced pain and improved mobility. Eval status: max level of pain 10/10 3. Pt to be able to tolerate standing/walking 8 hours with no increased pain to be able to accomplish work tasks. Eval status: pt limited to 3 hours of work and struggles to complete this 4. Pt to demonstrate negative ULTT, indicating normal neurodynamics in the B UE and improved tolerance to activity and mobility. Eval status: positive ULTT for median, ulnar, and radial nerves 5. Pt to demonstrate full cervical ROM in all planes with no increased c/o pain to improve mobility and reduce fall risk. Eval status: pt with 50% limited flexion, 50% limited extension, 50% limited R/L lateral flexion, 50% limited rotation R/L 
 
 
PLAN 
[]  Upgrade activities as tolerated     [x]  Continue plan of care 
[]  Update interventions per flow sheet      
[]  Discharge due to:_ 
[]  Other:_   
 
Gee Bermudez, PT 1/10/2019  4:50 PM 
 
Future Appointments Date Time Provider [place] 1/15/2019  1:30 PM Thien Patel MD [facility]  
1/16/2019  3:30 PM Mary Araiza PT [facility]  
1/18/2019  9:30 AM Robles Yu PTA [facility]  
1/21/2019  2:30 PM Sonny Harmon PT [facility]  
1/23/2019  2:00 PM [facility] Harmon [facility]  
1/28/2019  2:30 PM Sonny Harmon PT [facility]  
1/30/2019  3:00 PM Robles Yu PTA [facility]  
2/1/2019  8:40 AM Avril Norton MD [place]

## 2019-01-10 NOTE — PROGRESS NOTES
[facility]  [address] [facility], [address] [phone] fax Plan of Care/ Statement of Necessity for Physical Therapy Services Patient name: Em Martin Start of Care: 1/10/2019 Referral source: Nubia Duron MD : 1970 Medical Diagnosis: Neck pain [M54.2] Other cervical disc degeneration, unspecified cervical region [M50.30] Spondylosis without myelopathy or radiculopathy, cervical region [M47.812] Payor: BLUE CROSS MEDICAID / Plan: [address] [place] [address] / Product Type: Managed Care Medicaid /  Onset Date:2018 Treatment Diagnosis: cervical pain with B UE symptoms Prior Hospitalization: see medical history Provider#: [id number] Medications: Verified on Patient summary List  
 Comorbidities: OA 
 Prior Level of Function: Pt reports Independent PLOF. 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 The Plan of Care and following information is based on the information from the initial evaluation. Assessment/ key information: Pt is a 56 y/o female who presents today with new onset of cervical and B UE symptoms extending into the B hands. Pt reports that she is having numbness, burning, and tingling into the UE/hands. She had an x-ray in December and reports they found \"a pinched nerve and DDD. \"  Pt states that it is difficult to drive, difficult to hold onto items, and that she is beginning to notice weakness and reduced dexterity in the hands when she attempts to hold/fasten items. Pt notes reduced symptoms with heat. Pt presents with slightly reduced strength in the B UE with MMT. She has approximately 50% reduced cervical AROM in all planes. She notes reduced symptoms with cervical traction today and increased symptoms with cervical compression and ULTT. Pt denies HA.   Pt would benefit from skilled PT intervention to address the above limitations and return her to full PLOF. Evaluation Complexity History MEDIUM  Complexity : 1-2 comorbidities / personal factors will impact the outcome/ POC ; Examination MEDIUM Complexity : 3 Standardized tests and measures addressing body structure, function, activity limitation and / or participation in recreation  ;Presentation MEDIUM Complexity : Evolving with changing characteristics  ; Clinical Decision Making MEDIUM Complexity : FOTO score of 26-74 Overall Complexity Rating: MEDIUM Problem List: pain affecting function, decrease ROM, decrease strength, decrease ADL/ functional abilitiies, decrease activity tolerance, decrease flexibility/ joint mobility and decrease transfer abilities Treatment Plan may include any combination of the following: Therapeutic exercise, Therapeutic activities, Neuromuscular re-education, Physical agent/modality, Manual therapy and Patient education Patient / Family readiness to learn indicated by: asking questions, trying to perform skills and interest 
Persons(s) to be included in education: patient (P) Barriers to Learning/Limitations: None Patient Goal (s): not to make pain worse Patient Self Reported Health Status: poor Rehabilitation Potential: good Short Term Goals: To be accomplished in 2 weeks: 1. Pt to report [place] with HEP. Eval status: HEP issued and reviewed physically/verbally with pt Long Term Goals: To be accomplished in 4 weeks: 1. Pt to report score of 53 on FOTO, indicating improved tolerance to activity and reduced pain. Eval status: 32 on initial FOTO 2. Pt to report max level of pain <5/10 with activity, indicating reduced pain and improved mobility. Eval status: max level of pain 10/10 3. Pt to be able to tolerate standing/walking 8 hours with no increased pain to be able to accomplish work tasks. Eval status: pt limited to 3 hours of work and struggles to complete this 4.  Pt to demonstrate negative ULTT, indicating normal neurodynamics in the B UE and improved tolerance to activity and mobility. Eval status: positive ULTT for median, ulnar, and radial nerves 5. Pt to demonstrate full cervical ROM in all planes with no increased c/o pain to improve mobility and reduce fall risk. Eval status: pt with 50% limited flexion, 50% limited extension, 50% limited R/L lateral flexion, 50% limited rotation R/L Frequency / Duration: Patient to be seen 2 times per week for 8 weeks. Patient/ Caregiver education and instruction: Diagnosis, prognosis, self care, activity modification and exercises 
 [x]  Plan of care has been reviewed with HERNESTO Wallace [place] 1/10/2019 4:53 PM 
 
________________________________________________________________________ I certify that the above Therapy Services are being furnished while the patient is under my care. I agree with the treatment plan and certify that this therapy is necessary. [de-identified] Signature:____________Date:_________TIME:________ 
 
[employer], Date and Time must be completed for valid certification ** Please sign and return to [facility]  [facility] [address]  [place], [address] [phone] fax

## 2019-01-14 RX ORDER — OXYBUTYNIN CHLORIDE 5 MG/1
TABLET ORAL
Qty: 30 TAB | Refills: 0 | OUTPATIENT
Start: 2019-01-14

## 2019-01-15 ENCOUNTER — OFFICE VISIT (OUTPATIENT)
Dept: FAMILY MEDICINE CLINIC | Age: 49
End: 2019-01-15

## 2019-01-15 VITALS
DIASTOLIC BLOOD PRESSURE: 70 MMHG | HEIGHT: 64 IN | OXYGEN SATURATION: 100 % | HEART RATE: 69 BPM | SYSTOLIC BLOOD PRESSURE: 120 MMHG | TEMPERATURE: 98.2 F | WEIGHT: 179.4 LBS | BODY MASS INDEX: 30.63 KG/M2 | RESPIRATION RATE: 16 BRPM

## 2019-01-15 DIAGNOSIS — M54.2 NECK PAIN: Primary | ICD-10-CM

## 2019-01-15 DIAGNOSIS — R52 PAIN OF MULTIPLE SITES: ICD-10-CM

## 2019-01-15 DIAGNOSIS — D64.9 ANEMIA, UNSPECIFIED TYPE: ICD-10-CM

## 2019-01-15 DIAGNOSIS — Z90.710 H/O: HYSTERECTOMY: ICD-10-CM

## 2019-01-15 NOTE — PATIENT INSTRUCTIONS
Neck Pain: Care Instructions  Your Care Instructions    You can have neck pain anywhere from the bottom of your head to the top of your shoulders. It can spread to the upper back or arms. Injuries, painting a ceiling, sleeping with your neck twisted, staying in one position for too long, and many other activities can cause neck pain. Most neck pain gets better with home care. Your doctor may recommend medicine to relieve pain or relax your muscles. He or she may suggest exercise and physical therapy to increase flexibility and relieve stress. You may need to wear a special (cervical) collar to support your neck for a day or two. Follow-up care is a key part of your treatment and safety. Be sure to make and go to all appointments, and call your doctor if you are having problems. It's also a good idea to know your test results and keep a list of the medicines you take. How can you care for yourself at home? · Try using a heating pad on a low or medium setting for 15 to 20 minutes every 2 or 3 hours. Try a warm shower in place of one session with the heating pad. · You can also try an ice pack for 10 to 15 minutes every 2 to 3 hours. Put a thin cloth between the ice and your skin. · Take pain medicines exactly as directed. ? If the doctor gave you a prescription medicine for pain, take it as prescribed. ? If you are not taking a prescription pain medicine, ask your doctor if you can take an over-the-counter medicine. · If your doctor recommends a cervical collar, wear it exactly as directed. When should you call for help? Call your doctor now or seek immediate medical care if:    · You have new or worsening numbness in your arms, buttocks or legs.     · You have new or worsening weakness in your arms or legs.  (This could make it hard to stand up.)     · You lose control of your bladder or bowels.    Watch closely for changes in your health, and be sure to contact your doctor if:    · Your neck pain is getting worse.     · You are not getting better after 1 week.     · You do not get better as expected. Where can you learn more? Go to http://kosta-michelle.info/. Enter [id number] in the search box to learn more about \"Neck Pain: Care Instructions. \"  Current as of: November 29, 2017  Content Version: 11.8  © 0223-0004 [employer]. Care instructions adapted under license by [employer] (which disclaims liability or warranty for this information). If you have questions about a medical condition or this instruction, always ask your healthcare professional. [place] [address] any warranty or liability for your use of this information.

## 2019-01-15 NOTE — PROGRESS NOTES
HISTORY OF PRESENT ILLNESS  Kari Lee is a 57 y.o. female. HPI: here for follow up. Lab result discussed . Noted anemia. Discussed dietary supplement and multivitamin. No unusual fatigue but has neck pain which has been worsening lately. Following PT and spine specialist. On symptomatic treatment. Said topamax helps her to sleep at night. Average pain is 7/10 when she is mobile. Resting improves pain some. No swelling of joints. Currently feeling on and off neck and shoulder discomfort. No upper ext tingling ,numbness or weakness, no headache or dizziness, no nausea or vomiting. No vision changes. Visit Vitals  /70 (BP 1 Location: Left arm, BP Patient Position: Sitting)   Pulse 69   Temp 98.2 °F (36.8 °C) (Oral)   Resp 16   Ht 5' 4\" (1.626 m)   Wt 179 lb 6.4 oz (81.4 kg)   SpO2 100%   BMI 30.79 kg/m²     Review medication list, vitals, problem list,allergies. Lab Results   Component Value Date/Time    WBC 7.0 12/07/2018 12:00 AM    HGB 10.6 (L) 12/07/2018 12:00 AM    HCT 33.6 (L) 12/07/2018 12:00 AM    PLATELET 196 61/70/1009 12:00 AM    MCV 87 12/07/2018 12:00 AM     Lab Results   Component Value Date/Time    Sodium 141 12/07/2018 12:00 AM    Potassium 4.2 12/07/2018 12:00 AM    Chloride 104 12/07/2018 12:00 AM    CO2 24 12/07/2018 12:00 AM    Anion gap 8 12/28/2017 07:06 AM    Glucose 93 12/07/2018 12:00 AM    BUN 10 12/07/2018 12:00 AM    Creatinine 0.79 12/07/2018 12:00 AM    BUN/Creatinine ratio 13 12/07/2018 12:00 AM    GFR est  12/07/2018 12:00 AM    GFR est non-AA 90 12/07/2018 12:00 AM    Calcium 9.7 12/07/2018 12:00 AM    Bilirubin, total 0.3 12/07/2018 12:00 AM    AST (SGOT) 23 12/07/2018 12:00 AM    Alk.  phosphatase 95 12/07/2018 12:00 AM    Protein, total 7.3 12/07/2018 12:00 AM    Albumin 4.5 12/07/2018 12:00 AM    Globulin 3.6 12/28/2017 07:06 AM    A-G Ratio 1.6 12/07/2018 12:00 AM    ALT (SGPT) 16 12/07/2018 12:00 AM     Lab Results   Component Value Date/Time Cholesterol, total 176 12/07/2018 12:00 AM    HDL Cholesterol 59 12/07/2018 12:00 AM    LDL, calculated 97 12/07/2018 12:00 AM    VLDL, calculated 20 12/07/2018 12:00 AM    Triglyceride 99 12/07/2018 12:00 AM   \  Lab Results   Component Value Date/Time    Hemoglobin A1c 5.3 12/07/2018 12:00 AM   denies any other concern. No chest pain or sob. No cough or cold. No abdominal pain. No urinary or bowel complains. H/o total hysterectomy. discussed need for calcium and vitamin D daily along with multivitamin daily. ROS: see HPI     Physical Exam   Constitutional: She is oriented to person, place, and time. No distress. Cardiovascular: Normal heart sounds. Pulmonary/Chest: No respiratory distress. She has no wheezes. Abdominal: Soft. There is no tenderness. Musculoskeletal: She exhibits no edema. Generalize discomfort over cervical spine. No point tenderness. ROM with discomfort while touching chin to the chest .   Neurological: She is oriented to person, place, and time. ASSESSMENT and PLAN    ICD-10-CM ICD-9-CM    1. Neck pain: symptomatic treatment. Will follow ortho and pT recommendations. M54.2 723.1    2. Pain of multiple sites: observe for now. R52 780.96     neck, shoulders, upper ext    3. Anemia, unspecified type: advise multivitamin , calcium and vitamin D. Also advised iron rich diet. D64.9 285.9    4. H/O: hysterectomy Z90.710 V88.01    Discussed healthy life style, importance of diet modification, weight loss and exercise/ 30 minutes walking 5-7 days / week  as tolerated . Pt understood and agree with the plan   Review    Follow-up Disposition:  Return in about 3 months (around 4/15/2019).

## 2019-01-15 NOTE — PROGRESS NOTES
1. Have you been to the ER, urgent care clinic since your last visit? Hospitalized since your last visit? [facility] 11/24/18 and 12/16/18. 2. Have you seen or consulted any other health care providers outside of the [address] [place] [address] since your last visit? Include any pap smears or colon screening. No      Last pap smear - over three years Dr. Treva Lindo ([id number] [place] [address], [facility] ) [phone]. She would like to get pap smear at [facility].

## 2019-01-16 ENCOUNTER — HOSPITAL ENCOUNTER (OUTPATIENT)
Dept: PHYSICAL THERAPY | Age: 49
Discharge: HOME | End: 2019-01-16
Payer: MEDICAID

## 2019-01-16 PROCEDURE — 97140 MANUAL THERAPY 1/> REGIONS: CPT

## 2019-01-16 PROCEDURE — 97112 NEUROMUSCULAR REEDUCATION: CPT

## 2019-01-16 NOTE — PROGRESS NOTES
PT DAILY TREATMENT NOTE 10-18 Patient Name: Dru Player Date:2019 : 1970 [x]  Patient  Verified Payor: BLUE CROSS MEDICAID / Plan: [address] [place] [address] / Product Type: Managed Care Medicaid / In time:330  Out time:410 Total Treatment Time (min): 40 Visit #: 2 of 8 Medicare/BCBS Only Total Timed Codes (min):  40 1:1 Treatment Time:  40 Treatment Area: Neck pain [M54.2] Other cervical disc degeneration, unspecified cervical region [M50.30] Spondylosis without myelopathy or radiculopathy, cervical region [M47.812] SUBJECTIVE Pain Level (0-10 scale): 6 Any medication changes, allergies to medications, adverse drug reactions, diagnosis change, or new procedure performed?: [x] No    [] Yes (see summary sheet for update) Subjective functional status/changes:   [] No changes reported \"I'm doing ok. \" OBJECTIVE Modality rationale: decrease pain and increase tissue extensibility to improve the patients ability to improve ease with mobility. Min Type Additional Details  
 [] Estim:  []Unatt       []IFC  []Premod []Other:  []w/ice   []w/heat Position: Location:  
 [] Estim: []Att    []TENS instruct  []NMES []Other:  []w/US   []w/ice   []w/heat Position: Location:  
 []  Traction: [] Cervical       []Lumbar 
                     [] Prone          []Supine []Intermittent   []Continuous Lbs: 
[] before manual 
[] after manual  
 []  Ultrasound: []Continuous   [] Pulsed []1MHz   []3MHz W/cm2: 
Location:  
 []  Iontophoresis with dexamethasone Location: [] Take home patch  
[] In clinic  
10 []  Ice     [x]  heat 
[]  Ice massage 
[]  Laser  
[]  Anodyne Position:supine with wedge Location:neck & upper back  
 []  Laser with stim 
[]  Other:  Position: Location:  
 []  Vasopneumatic Device Pressure:       [] lo [] med [] hi  
Temperature: [] lo [] med [] hi  
 [x] Skin assessment post-treatment:  [x]intact []redness- no adverse reaction 
  []redness  adverse reaction:  
 
 
 
32 min Neuromuscular Re-education:  [x]  See flow sheet :  
Rationale: increase ROM, increase strength, improve coordination, improve balance and increase proprioception  to improve the patients ability to perform ADLs. 8 min Manual Therapy:  Gentle manual cervical traction/ SOR with patient in supine, manual UT stretching, attempted STM to B UT & cervical paraspinals but unable to tolerate. Rationale: decrease pain, increase ROM, increase tissue extensibility, decrease trigger points and increase postural awareness to improve ease with ADLs. With 
 [] TE 
 [] TA 
 [] neuro 
 [] other: Patient Education: [x] Review HEP [] Progressed/Changed HEP based on:  
[] positioning   [] body mechanics   [] transfers   [] heat/ice application   
[] other:   
 
Other Objective/Functional Measures:   
 
Pain Level (0-10 scale) post treatment: 4 
 
ASSESSMENT/Changes in Function: responds well to traction for decline in UE referred symptoms. Responds well with slight cervical extension for reduction of symptoms. Had increase in UE pain with UT shrug so held off further reps. Unable to tolerate STM to B UT & cervical paraspinal yet due to pain & sensitivity. Should be good mechanical traction for reduction of radicular symptoms next session. Patient will continue to benefit from skilled PT services to modify and progress therapeutic interventions, address functional mobility deficits, address ROM deficits, address strength deficits, analyze and address soft tissue restrictions, analyze and cue movement patterns, analyze and modify body mechanics/ergonomics, assess and modify postural abnormalities, address imbalance/dizziness and instruct in home and community integration to attain remaining goals. []  See Plan of Care 
[]  See progress note/recertification 
[]  See Discharge Summary Progress towards goals / Updated goals: 
Short Term Goals: To be accomplished in 2 weeks: 1. Pt to report [place] with HEP. Eval status: HEP issued and reviewed physically/verbally with pt 
  
Long Term Goals: To be accomplished in 4 weeks: 1. Pt to report score of 53 on FOTO, indicating improved tolerance to activity and reduced pain. Eval status: 32 on initial FOTO 2. Pt to report max level of pain <5/10 with activity, indicating reduced pain and improved mobility. Eval status: max level of pain 10/10 3. Pt to be able to tolerate standing/walking 8 hours with no increased pain to be able to accomplish work tasks. Eval status: pt limited to 3 hours of work and struggles to complete this 4. Pt to demonstrate negative ULTT, indicating normal neurodynamics in the B UE and improved tolerance to activity and mobility. Eval status: positive ULTT for median, ulnar, and radial nerves 5. Pt to demonstrate full cervical ROM in all planes with no increased c/o pain to improve mobility and reduce fall risk. Eval status: pt with 50% limited flexion, 50% limited extension, 50% limited R/L lateral flexion, 50% limited rotation R/L 
 
PLAN 
[]  Upgrade activities as tolerated     [x]  Continue plan of care 
[]  Update interventions per flow sheet      
[]  Discharge due to:_ 
[]  Other:_   
 
Hermelindo Tavera, PT 1/16/2019  4:05 PM 
 
Future Appointments Date Time Provider [place] 1/18/2019  9:30 AM Larisa Hunt PTA [facility]  
1/21/2019  2:30 PM Du Ward PT [facility]  
1/23/2019  2:00 PM Du Ward PT [facility]  
1/28/2019  2:30 PM Du Ward PT [facility]  
1/30/2019  3:00 PM Carissa Martines [facility]  
2/1/2019  8:40 AM Sofie Siemens, Nazia Melo MD [facility]

## 2019-01-18 ENCOUNTER — APPOINTMENT (OUTPATIENT)
Dept: PHYSICAL THERAPY | Age: 49
End: 2019-01-18
Payer: MEDICAID

## 2019-01-21 ENCOUNTER — HOSPITAL ENCOUNTER (OUTPATIENT)
Dept: PHYSICAL THERAPY | Age: 49
Discharge: HOME | End: 2019-01-21
Payer: MEDICAID

## 2019-01-21 PROCEDURE — 97012 MECHANICAL TRACTION THERAPY: CPT | Performed by: PHYSICAL THERAPIST

## 2019-01-21 PROCEDURE — 97110 THERAPEUTIC EXERCISES: CPT | Performed by: PHYSICAL THERAPIST

## 2019-01-21 NOTE — PROGRESS NOTES
PT DAILY TREATMENT NOTE 10-18 Patient Name: Esther Lombardi Date:2019 : 1970 [x]  Patient  Verified Payor: BLUE CROSS MEDICAID / Plan: [address] [place] [address] / Product Type: Managed Care Medicaid / In time:225  Out time:300 Total Treatment Time (min): 35 Visit #: 3 of 8 Medicare/BCBS Only Total Timed Codes (min):  35 1:1 Treatment Time:  35 Treatment Area: Neck pain [M54.2] Other cervical disc degeneration, unspecified cervical region [M50.30] Spondylosis without myelopathy or radiculopathy, cervical region [M47.812] SUBJECTIVE Pain Level (0-10 scale): 5 Any medication changes, allergies to medications, adverse drug reactions, diagnosis change, or new procedure performed?: [x] No    [] Yes (see summary sheet for update) Subjective functional status/changes:   [x] No changes reported OBJECTIVE Modality rationale: decrease pain and increase tissue extensibility to improve the patients ability to tolerate daily tasks with improved mobility/stability and reduced pain. Min Type Additional Details  
 [] Estim:  []Unatt       []IFC  []Premod []Other:  []w/ice   []w/heat Position: Location:  
 [] Estim: []Att    []TENS instruct  []NMES []Other:  []w/US   []w/ice   []w/heat Position: Location:  
8 [x]  Traction: [x] Cervical       []Lumbar 
                     [] Prone          [x]Supine 
                     [x]Intermittent   []Continuous Lbs:12 
[] before manual 
[] after manual  
 []  Ultrasound: []Continuous   [] Pulsed []1MHz   []3MHz W/cm2: 
Location:  
 []  Iontophoresis with dexamethasone Location: [] Take home patch  
[] In clinic  
 []  Ice     []  heat 
[]  Ice massage 
[]  Laser  
[]  Anodyne Position: Location:  
 []  Laser with stim 
[]  Other:  Position: Location:  
 []  Vasopneumatic Device Pressure:       [] lo [] med [] hi  
Temperature: [] lo [] med [] hi  
 [x] Skin assessment post-treatment:  [x]intact []redness- no adverse reaction 
  []redness  adverse reaction:  
 
27 min Therapeutic Exercise:  [] See flow sheet :  
Rationale: increase ROM, increase strength, improve coordination and increase proprioception to improve the patients ability to tolerate daily tasks with improved mobility/stability and reduced pain. With 
 [] TE 
 [] TA 
 [] neuro 
 [] other: Patient Education: [x] Review HEP [] Progressed/Changed HEP based on:  
[] positioning   [] body mechanics   [] transfers   [] heat/ice application   
[] other:   
 
Other Objective/Functional Measures: Cues for form during exercises. Education regarding cervical traction treatment protocol, precautions, expected response. Pain Level (0-10 scale) post treatment: 4 
 
ASSESSMENT/Changes in Function: Pt notes reduced radicular symptoms during and after mechanical traction trial #1 today. Will monitor benefit. Patient will continue to benefit from skilled PT services to modify and progress therapeutic interventions, address functional mobility deficits, address ROM deficits, address strength deficits, analyze and address soft tissue restrictions, analyze and cue movement patterns, analyze and modify body mechanics/ergonomics, assess and modify postural abnormalities and address imbalance/dizziness to attain remaining goals. Progress towards goals / Updated goals: 
Short Term Goals: To be accomplished in 2 weeks: 1.  Pt to report [place] with HEP.              Eval status: HEP issued and reviewed physically/verbally with pt 
  
Long Term Goals: To be accomplished in 4 weeks: 1.  Pt to report score of 53 on FOTO, indicating improved tolerance to activity and reduced pain. 
             Eval status: 32 on initial FOTO 2.  Pt to report max level of pain <5/10 with activity, indicating reduced pain and improved mobility.              Eval status: max level of pain 10/10 3.  Pt to be able to tolerate standing/walking 8 hours with no increased pain to be able to accomplish work tasks. 
Abeba Cuadra status: pt limited to 3 hours of work and struggles to complete this 4.  Pt to demonstrate negative ULTT, indicating normal neurodynamics in the B UE and improved tolerance to activity and mobility.              Eval status: positive ULTT for median, ulnar, and radial nerves 5.  Pt to demonstrate full cervical ROM in all planes with no increased c/o pain to improve mobility and reduce fall risk. 
             Eval status: pt with 50% limited flexion, 50% limited extension, 50% limited R/L lateral flexion, 50% limited rotation R/L 
 
 
PLAN 
[]  Upgrade activities as tolerated     [x]  Continue plan of care 
[]  Update interventions per flow sheet      
[]  Discharge due to:_ 
[]  Other:_   
 
Ronn Banuelos, PT 1/21/2019  2:44 PM 
 
Future Appointments Date Time Provider [place] 1/23/2019  2:00 PM [facility]  
1/28/2019  2:30 PM [facility]  
1/30/2019  3:00 PM [facility]  
2/1/2019  8:40 AM Thomas Kelley MD [place]

## 2019-01-23 ENCOUNTER — HOSPITAL ENCOUNTER (OUTPATIENT)
Dept: PHYSICAL THERAPY | Age: 49
End: 2019-01-23
Payer: MEDICAID

## 2019-01-28 ENCOUNTER — HOSPITAL ENCOUNTER (OUTPATIENT)
Dept: PHYSICAL THERAPY | Age: 49
Discharge: HOME | End: 2019-01-28
Payer: MEDICAID

## 2019-01-28 PROCEDURE — 97110 THERAPEUTIC EXERCISES: CPT | Performed by: PHYSICAL THERAPIST

## 2019-01-28 PROCEDURE — 97012 MECHANICAL TRACTION THERAPY: CPT | Performed by: PHYSICAL THERAPIST

## 2019-01-28 NOTE — PROGRESS NOTES
PT DAILY TREATMENT NOTE 10-18 Patient Name: Karen Justice Date:2019 : 1970 [x]  Patient  Verified Payor: [employer]ICAID / Plan: [address] [place] [address] / Product Type: Managed Care Medicaid / In time:235  Out time:305 Total Treatment Time (min): 30 Visit #: 4 of 8 Medicare/BCBS Only Total Timed Codes (min):  30 1:1 Treatment Time:  21 Treatment Area: Neck pain [M54.2] Other cervical disc degeneration, unspecified cervical region [M50.30] Spondylosis without myelopathy or radiculopathy, cervical region [M47.812] SUBJECTIVE Pain Level (0-10 scale): 3 Any medication changes, allergies to medications, adverse drug reactions, diagnosis change, or new procedure performed?: [x] No    [] Yes (see summary sheet for update) Subjective functional status/changes:   [] No changes reported \"The traction definitely helped me last time. \" OBJECTIVE 
  
Modality rationale: decrease pain and increase tissue extensibility to improve the patients ability to tolerate daily tasks with improved mobility/stability and reduced pain. Min Type Additional Details   
  [] Estim:  []Unatt       []IFC  []Premod []Other:  []w/ice   []w/heat Position: Location:   
  [] Estim: []Att    []TENS instruct  []NMES []Other:  []w/US   []w/ice   []w/heat Position: Location:   
8 [x]  Traction: [x] Cervical       []Lumbar 
                     [] Prone          [x]Supine 
                     [x]Intermittent   []Continuous Lbs:15 
[] before manual 
[] after manual   
  []  Ultrasound: []Continuous   [] Pulsed []1MHz   []3MHz W/cm2: 
Location:   
  []  Iontophoresis with dexamethasone Location: [] Take home patch  
[] In clinic   
  []  Ice     []  heat 
[]  Ice massage 
[]  Laser  
[]  Anodyne Position: Location:   
  []  Laser with stim 
[]  Other:  Position: Location:   []  Vasopneumatic Device Pressure:       [] lo [] med [] hi  
Temperature: [] lo [] med [] hi   
[x] Skin assessment post-treatment:  [x]intact []redness- no adverse reaction 
  []redness  adverse reaction:  
  
22 min Therapeutic Exercise:  [] See flow sheet :  
Rationale: increase ROM, increase strength, improve coordination and increase proprioception to improve the patients ability to tolerate daily tasks with improved mobility/stability and reduced pain. With 
 [] TE 
 [] TA 
 [] neuro 
 [] other: Patient Education: [x] Review HEP [] Progressed/Changed HEP based on:  
[] positioning   [] body mechanics   [] transfers   [] heat/ice application   
[] other:   
  
Other Objective/Functional Measures:  
  
Pain Level (0-10 scale) post treatment: 0 
  
ASSESSMENT/Changes in Function: Pt continues to note reduced radicular symptoms during and after mechanical traction trial #2 today and reports no symptoms at end of session today. Will continue to monitor benefit. 
  
Patient will continue to benefit from skilled PT services to modify and progress therapeutic interventions, address functional mobility deficits, address ROM deficits, address strength deficits, analyze and address soft tissue restrictions, analyze and cue movement patterns, analyze and modify body mechanics/ergonomics, assess and modify postural abnormalities and address imbalance/dizziness to attain remaining goals. 
  
Progress towards goals / Updated goals: 
Short Term Goals: To be accomplished in 2 weeks: 1.  Pt to report [place] with HEP.              Eval status: HEP issued and reviewed physically/verbally with pt 
  
Long Term Goals: To be accomplished in 4 weeks: 1.  Pt to report score of 53 on FOTO, indicating improved tolerance to activity and reduced pain. 
             Eval status: 32 on initial FOTO 2.  Pt to report max level of pain <5/10 with activity, indicating reduced pain and improved mobility.              Eval status: max level of pain 10/10 3.  Pt to be able to tolerate standing/walking 8 hours with no increased pain to be able to accomplish work tasks. 
[place] status: pt limited to 3 hours of work and struggles to complete this 4.  Pt to demonstrate negative ULTT, indicating normal neurodynamics in the B UE and improved tolerance to activity and mobility.              Eval status: positive ULTT for median, ulnar, and radial nerves 5.  Pt to demonstrate full cervical ROM in all planes with no increased c/o pain to improve mobility and reduce fall risk. 
             Eval status: pt with 50% limited flexion, 50% limited extension, 50% limited R/L lateral flexion, 50% limited rotation R/L 
 
 
 
PLAN 
[]  Upgrade activities as tolerated     [x]  Continue plan of care 
[]  Update interventions per flow sheet      
[]  Discharge due to:_ 
[]  Other:_   
 
Aaron Devine PT 1/28/2019  2:45 PM 
 
Future Appointments Date Time Provider [place] 1/30/2019  3:00 PM Manuel Harman PTA [facility]  
2/1/2019  8:40 AM Bard Shiloh MD [facility]

## 2019-01-30 ENCOUNTER — HOSPITAL ENCOUNTER (OUTPATIENT)
Dept: PHYSICAL THERAPY | Age: 49
Discharge: HOME | End: 2019-01-30
Payer: MEDICAID

## 2019-01-30 PROCEDURE — 97012 MECHANICAL TRACTION THERAPY: CPT

## 2019-01-30 PROCEDURE — 97112 NEUROMUSCULAR REEDUCATION: CPT

## 2019-01-30 NOTE — PROGRESS NOTES
PT DAILY TREATMENT NOTE 10-18 Patient Name: Jose Rafael Wong Date:2019 : 1970 [x]  Patient  Verified Payor: [employer] / Plan: [address] [place] [address] / Product Type: Managed Care Medicaid / In time:253  Out time:323 Total Treatment Time (min): 30 Visit #: 5 of 8 Medicare/BCBS Only Total Timed Codes (min):  20 1:1 Treatment Time:  20  
 
 
Treatment Area: Neck pain [M54.2] Other cervical disc degeneration, unspecified cervical region [M50.30] Spondylosis without myelopathy or radiculopathy, cervical region [M47.812] SUBJECTIVE Pain Level (0-10 scale): 0 Any medication changes, allergies to medications, adverse drug reactions, diagnosis change, or new procedure performed?: [x] No    [] Yes (see summary sheet for update) Subjective functional status/changes:   [] No changes reported \"No pain today. \" OBJECTIVE Modality rationale: decrease pain and increase tissue extensibility to improve the patients ability to improve mobility and positional tolerance Min Type Additional Details  
 [] Estim:  []Unatt       []IFC  []Premod []Other:  []w/ice   []w/heat Position: Location:  
 [] Estim: []Att    []TENS instruct  []NMES []Other:  []w/US   []w/ice   []w/heat Position: Location:  
10 [x]  Traction: [x] Cervical       []Lumbar 
                     [] Prone          [x]Supine 
                     [x]Intermittent   []Continuous Lbs: 15 
[] before manual 
[] after manual  
 []  Ultrasound: []Continuous   [] Pulsed []1MHz   []3MHz W/cm2: 
Location:  
 []  Iontophoresis with dexamethasone Location: [] Take home patch  
[] In clinic  
 []  Ice     []  heat 
[]  Ice massage 
[]  Laser  
[]  Anodyne Position: Location:  
 []  Laser with stim 
[]  Other:  Position: Location:  
 []  Vasopneumatic Device Pressure:       [] lo [] med [] hi  
Temperature: [] lo [] med [] hi  
 [x] Skin assessment post-treatment:  [x]intact []redness- no adverse reaction 
  []redness  adverse reaction:  
 
20 min Neuromuscular Re-education:  [x]  See flow sheet :  
Rationale: increase ROM, increase strength, improve coordination, improve balance and increase proprioception  to improve the patients ability to improve mobility and upright posture With 
 [] TE 
 [] TA [x] neuro 
 [] other: Patient Education: [x] Review HEP [] Progressed/Changed HEP based on:  
[x] positioning   [x] body mechanics   [] transfers   [] heat/ice application   
[] other:   
 
Other Objective/Functional Measures:   
 
Pain Level (0-10 scale) post treatment: 0 
 
ASSESSMENT/Changes in Function: Pt is making great progress with her pain relief. She reports having relief with cervical traction following her last visit. Patient will continue to benefit from skilled PT services to modify and progress therapeutic interventions, address functional mobility deficits, address ROM deficits, address strength deficits, analyze and address soft tissue restrictions, analyze and cue movement patterns, analyze and modify body mechanics/ergonomics, assess and modify postural abnormalities, address imbalance/dizziness and instruct in home and community integration to attain remaining goals. [x]  See Plan of Care 
[]  See progress note/recertification 
[]  See Discharge Summary Progress towards goals / Updated goals: 
Short Term Goals: To be accomplished in 2 weeks: 1.  Pt to report [place] with HEP.              Eval status: HEP issued and reviewed physically/verbally with pt 
  MET Long Term Goals: To be accomplished in 4 weeks: 1.  Pt to report score of 53 on FOTO, indicating improved tolerance to activity and reduced pain. 
             Eval status: 32 on initial FOTO Assess at 30 day destin 2.  Pt to report max level of pain <5/10 with activity, indicating reduced pain and improved mobility.              Eval status: max level of pain 10/10 Pain is controlled recently 3.  Pt to be able to tolerate standing/walking 8 hours with no increased pain to be able to accomplish work tasks. 
Abeba Cuadra status: pt limited to 3 hours of work and struggles to complete this Making progress 4.  Pt to demonstrate negative ULTT, indicating normal neurodynamics in the B UE and improved tolerance to activity and mobility.              Eval status: positive ULTT for median, ulnar, and radial nerves Making progress 5.  Pt to demonstrate full cervical ROM in all planes with no increased c/o pain to improve mobility and reduce fall risk. 
             Eval status: pt with 50% limited flexion, 50% limited extension, 50% limited R/L lateral flexion, 50% limited rotation R/L Measure at [place] 
[]  Upgrade activities as tolerated     [x]  Continue plan of care 
[]  Update interventions per flow sheet      
[]  Discharge due to:_ 
[]  Other:_   
 
Tegan Colindres, HERNESTO, CSCS 1/30/2019  3:38 PM 
 
Future Appointments Date Time Provider [place] 2/1/2019  8:40 AM Thomas Kelly MD [place]

## 2019-02-04 ENCOUNTER — APPOINTMENT (OUTPATIENT)
Dept: PHYSICAL THERAPY | Age: 49
End: 2019-02-04
Payer: MEDICAID

## 2019-02-06 ENCOUNTER — HOSPITAL ENCOUNTER (OUTPATIENT)
Dept: PHYSICAL THERAPY | Age: 49
Discharge: HOME | End: 2019-02-06
Payer: MEDICAID

## 2019-02-06 PROCEDURE — 97110 THERAPEUTIC EXERCISES: CPT

## 2019-02-06 NOTE — PROGRESS NOTES
PT DISCHARGE DAILY NOTE AND [id number]    Date:2019  Patient name: Jose Rafael Wong Start of Care: 1/10/2019   Referral source: Diego Ledezma MD : 1970                Medical Diagnosis: Neck pain [M54.2]  Other cervical disc degeneration, unspecified cervical region [M50.30]  Spondylosis without myelopathy or radiculopathy, cervical region [M47.812]  Payor: BLUE CROSS MEDICAID / Plan: [employer] / Product Type: Managed Care Medicaid /  Onset Date:2018                Treatment Diagnosis: cervical pain with B UE symptoms   Prior Hospitalization: see medical history Provider#: [id number]   Medications: Verified on Patient summary List    Comorbidities: OA   Prior Level of Function: Pt reports Independent PLOF. 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 Visits from Start of Care: 6    Missed Visits: 2    Reporting Period : 1/10/19 to 19    Date:2019  : 1970  [x]  Patient  Verified  Payor: BLUE CROSS MEDICAID / Plan: [employer] / Product Type: Managed Care Medicaid /    In time:430  Out time:456  Total Treatment Time (min): 26  Visit #: 6 of 8    Medicare/BCBS Only   Total Timed Codes (min):  26 1:1 Treatment Time:  26       SUBJECTIVE  Pain Level (0-10 scale): 0  Any medication changes, allergies to medications, adverse drug reactions, diagnosis change, or new procedure performed?: [x] No    [] Yes (see summary sheet for update)  Subjective functional status/changes:   [] No changes reported  \"I'm much better than I was. \"    OBJECTIVE    26 min Therapeutic Exercise:  [x] See flow sheet :   Rationale: increase ROM, increase strength, improve coordination, improve balance and increase proprioception to improve the patients ability to perform ADls.                                             Functional Gains: driving, reaching in backseat, neck motion,   Functional Deficits: prolonged standing/walking  % improvement: 95%  Pain   Average: 2/10       Best: 0/10     Worst: 3/10  Patient Goal: \"maintain neck improvements for when I go back to school. \"            With   [] TE   [] TA   [] neuro   [] other: Patient Education: [x] Review HEP    [] Progressed/Changed HEP based on:   [] positioning   [] body mechanics   [] transfers   [] heat/ice application    [] other:      Other Objective/Functional Measures: FOTO: 69     Pain Level (0-10 scale) post treatment: 0    Summary of Care:  Short Term Goals: To be accomplished in 2 weeks:  1.  Pt to report [place] with HEP.              Eval status: HEP issued and reviewed physically/verbally with pt              MET  Long Term Goals: To be accomplished in 4 weeks:  1.  Pt to report score of 53 on FOTO, indicating improved tolerance to activity and reduced pain.               Eval status: 32 on initial FOTO              MET:69  2.  Pt to report max level of pain <5/10 with activity, indicating reduced pain and improved mobility.              Eval status: max level of pain 10/10             MET: 3/10 MAX  3.  Pt to be able to tolerate standing/walking 8 hours with no increased pain to be able to accomplish work tasks.  Ana Maria Lombard status: pt limited to 3 hours of work and struggles to complete this              NOT MET:able to stand longer but does need some seated rest breaks to accommodate. 4.  Pt to demonstrate negative ULTT, indicating normal neurodynamics in the B UE and improved tolerance to activity and mobility.                Eval status: positive ULTT for median, ulnar, and radial nerves             MET: no limitations  5.  Pt to demonstrate full cervical ROM in all planes with no increased c/o pain to improve mobility and reduce fall risk.               Eval status: pt with 50% limited flexion, 50% limited extension, 50% limited R/L lateral flexion, 50% limited rotation R/L              MET: full ROM in all planes           ASSESSMENT/Changes in Function: Ms. Royal Martinez has seen great response to PT for improved pain, referred symptoms, and positional tolerance. At this time, patient reports minimal functional deficits and would like to transition to independent HEP for self progression of standing tolerance. We will discharge from outpatient PT services.      Thank you for this referral!      PLAN  [x]Discontinue therapy: [x]Patient has reached or is progressing toward set goals      []Patient is non-compliant or has abdicated      []Due to lack of appreciable progress towards set [id number], PT 2/6/2019  4:41 PM

## 2019-02-11 ENCOUNTER — APPOINTMENT (OUTPATIENT)
Dept: PHYSICAL THERAPY | Age: 49
End: 2019-02-11
Payer: MEDICAID

## 2019-03-25 ENCOUNTER — OFFICE VISIT (OUTPATIENT)
Dept: ORTHOPEDIC SURGERY | Age: 49
End: 2019-03-25

## 2019-03-25 VITALS
TEMPERATURE: 97.5 F | OXYGEN SATURATION: 99 % | WEIGHT: 185.4 LBS | HEIGHT: 64 IN | BODY MASS INDEX: 31.65 KG/M2 | HEART RATE: 84 BPM | SYSTOLIC BLOOD PRESSURE: 108 MMHG | RESPIRATION RATE: 26 BRPM | DIASTOLIC BLOOD PRESSURE: 72 MMHG

## 2019-03-25 DIAGNOSIS — M50.30 DDD (DEGENERATIVE DISC DISEASE), CERVICAL: Primary | ICD-10-CM

## 2019-03-25 DIAGNOSIS — M54.12 CERVICAL RADICULOPATHY: ICD-10-CM

## 2019-03-25 DIAGNOSIS — M47.812 CERVICAL SPONDYLOSIS WITHOUT MYELOPATHY: ICD-10-CM

## 2019-03-25 RX ORDER — GABAPENTIN 300 MG/1
300 CAPSULE ORAL 3 TIMES DAILY
Qty: 90 CAP | Refills: 1 | Status: SHIPPED | OUTPATIENT
Start: 2019-03-25 | End: 2019-05-10

## 2019-03-25 NOTE — PROGRESS NOTES
1. Have you been to the ER, urgent care clinic since your last visit? Hospitalized since your last visit? No    2. Have you seen or consulted any other health care providers outside of the [address] [place] [address] since your last visit? Include any pap smears or colon screening.  No

## 2019-03-25 NOTE — PROGRESS NOTES
[facility]  [address], [address]  Phone: [phone]  Fax: [phone]        PROGRESS NOTE      HISTORY OF PRESENT ILLNESS:  The patient is a 57 y.o. female and was seen today for follow up of primary complaint of progressive neck pain extending into the bilateral shoulders and the BUE (R=L) with paraesthesias to all digits in the bilateral hands since October 2018. She really has widespread pain complaints. Pt is accompanied by her mother today. She denies specific injury or trauma. Her pain is not worsened positionally. She denies a hx of cervical surgery, injections, or PT. She has treated with MDP and Flexeril without relief. Pt denies dropping things or loss of balance. Patient denies history of glaucoma. She denies possibility of pregnancy or breastfeeding (hx pf tubal ligation). Pt denies fever, weight loss, or skin changes. The patient is RHD. ER note from Shena Miranda, [address] dated 12/16/18 indicating patient presented for neck pain into the BUE to the hands x a few weeks. She was neurologically intact, and no treatment was noted. Cervical spine XR dated 12/16/18 reviewed. Per report, mild to moderate DJD at C5-C6 and C6-C7. At her last clinical appointment, patient presented with primary complaint of progressive neck pain extending into the bilateral shoulders and the BUE (R=L) with paraesthesias to all digits in the bilateral hands since October 2018. She really had widespread pain complaints. I referred her to physical therapy with an emphasis on HEP. I tried her on Topamax 75 mg qhs. The patient returns today with BUE pain/paraesthesias extending to the hands involving all digits. Pt denies neck pain at this time. She rates her pain 8/10, previously 10/10. She additionally endorses bilateral shoulder and knee pain. Pt completed PT with good benefit. She is not completing her HEP.  Pt ran out of Topamax 75 mg qhs, which she tolerated without relief. Pt denies dropping things or loss of balance. Note from Jarred Prajapati MD dated 1/15/19 indicating patient was seen with c/o neck pain with recent worsening. No UE paraesthesias at that time. Therapy notes reviewed.  reviewed. Body mass index is 31.82 kg/m².       PCP: Jarred Prajapati MD      Past Medical History:   Diagnosis Date    Anemia     Arthritis     Fibroids     uterine fibroids        Social History     Socioeconomic History    Marital status: SINGLE     Spouse name: Not on file    Number of children: Not on file    Years of education: Not on file    Highest education level: Not on file   Occupational History    Not on file   Social Needs    Financial resource strain: Not on file    Food insecurity:     Worry: Not on file     Inability: Not on file    Transportation needs:     Medical: Not on file     Non-medical: Not on file   Tobacco Use    Smoking status: Never Smoker    Smokeless tobacco: Never Used   Substance and Sexual Activity    Alcohol use: No    Drug use: No    Sexual activity: Not Currently     Partners: Male   Lifestyle    Physical activity:     Days per week: Not on file     Minutes per session: Not on file    Stress: Not on file   Relationships    Social connections:     Talks on phone: Not on file     Gets together: Not on file     Attends [employer]aker service: Not on file     Active member of club or organization: Not on file     Attends meetings of clubs or organizations: Not on file     Relationship status: Not on file    Intimate partner violence:     Fear of current or ex partner: Not on file     Emotionally abused: Not on file     Physically abused: Not on file     Forced sexual activity: Not on file   Other Topics Concern    Not on file   Social History Narrative    Not on file       Current Outpatient Medications   Medication Sig Dispense Refill    topiramate (TOPAMAX) 25 mg tablet 3 tabs PO QHS 90 Tab 1    cyclobenzaprine (FLEXERIL) 5 mg tablet Take 2 Tabs by mouth three (3) times daily. 9 Tab 0       No Known Allergies       PHYSICAL EXAMINATION    Visit Vitals  LMP 01/15/2016       CONSTITUTIONAL: NAD, A&O x 3  SENSATION: Intact to light touch throughout  NEURO: Niko's is negative bilaterally. RANGE OF MOTION: The patient has full passive range of motion in all four extremities. Shoulder AB/Flex Elbow Flex Wrist Ext Elbow Ext Wrist Flex Hand Intrin Tone   Right +4/5 +4/5 +4/5 +4/5 +4/5 +4/5 +4/5   Left +4/5 +4/5 +4/5 +4/5 +4/5 +4/5 +4/5                 ASSESSMENT   Diagnoses and all orders for this visit:    1. DDD (degenerative disc disease), cervical    2. Cervical spondylosis without myelopathy    3. Cervical radiculopathy          IMPRESSION AND PLAN:  The patient returns today with BUE pain/paraesthesias extending to the hands involving all digits. Pt denies neck pain at this time. She additionally endorses bilateral knee and shoulder pain. I will refer her to neurology for an EMG of the BUE. I will try her on Neurontin 300 mg TID. The risks, benefits, and potential side effects of this medication were discussed. Patient understands and wishes to proceed. Patient advised to call the office if intolerant to new medication. I recommend she increase the frequency of HEP to daily. Patient is neurologically intact. I will see the patient back following EMG. Written by Yves Escalante, as dictated by Lyndsey Ellison MD  I examined the patient, reviewed and agree with the note.

## 2019-04-03 ENCOUNTER — OFFICE VISIT (OUTPATIENT)
Dept: NEUROLOGY | Age: 49
End: 2019-04-03

## 2019-04-03 DIAGNOSIS — G56.03 BILATERAL CARPAL TUNNEL SYNDROME: Primary | ICD-10-CM

## 2019-04-03 NOTE — PROGRESS NOTES
Joselo Schultz is a 57 y.o., right handed female, with no real past neurologic history who comes in complaining of numbness and swelling of the hands is going on since late 2018. He says she has pain and tightness in the hands with some pain and stiffness in her neck and shoulders. She has numbness and tingling in the hands. Social History; single lives with her child. No alcohol no tobacco.  Works as a CNA. Current Outpatient Medications   Medication Sig Dispense Refill    gabapentin (NEURONTIN) 300 mg capsule Take 1 Cap by mouth three (3) times daily. 90 Cap 1    topiramate (TOPAMAX) 25 mg tablet 3 tabs PO QHS 90 Tab 1    cyclobenzaprine (FLEXERIL) 5 mg tablet Take 2 Tabs by mouth three (3) times daily. 9 Tab 0       Past Medical History:   Diagnosis Date    Anemia     Arthritis     Fibroids     uterine fibroids       Past Surgical History:   Procedure Laterality Date    HX HYSTERECTOMY      HX OTHER SURGICAL      surgery for punctured intestine    HX OVARIAN CYST REMOVAL Bilateral     HX TUBAL LIGATION         No Known Allergies    There is no problem list on file for this patient. Review of Systems:   As above otherwise 11 point review of systems negative including;   Constitutional no fever or chills  Skin denies rash or itching  HENT  Denies tinnitus, hearing lose  Eyes denies diplopia vision lose  Respiratory denies shortness of breath  Cardiovascular denies chest pain, dyspnea on exertion  Gastrointestinal denies nausea, vomiting, diarrhea, constipation  Genitourinary denies incontinence  Musculoskeletal denies joint pain or swelling  Endocrine denies weight change  Hematology denies easy bruising or bleeding   Neurological as above in HPI      PHYSICAL EXAMINATION:      VITAL SIGNS:    Visit Vitals  LMP 01/15/2016       GENERAL: The patient is well developed, well nourished, and in no apparent distress. EXTREMITIES: No clubbing, cyanosis, or edema is identified.   Pulses 2+ and symmetrical.  Muscle tone is normal.  HEAD:   Ear, nose, and throat appear to be without trauma. The patient is normocephalic. NEUROLOGIC EXAMINATION    MENTAL STATUS: The patient is awake, alert, and oriented x 4. Fund of knowledge is adequate. Speech is fluent and memory appears to be intact, both long and short term. CRANIAL NERVES: II - Visual fields are full to confrontation. Funduscopic examination reveals flat disks bilaterally. Pupils are both 4 mm and briskly reactive to light and accommodation. III, IV, VI - Extraocular movements are intact and there is no nystagmus. V - Facial sensation is intact to pinprick and light touch. VII - Face is symmetrical.   VIII - Hearing is present. IX, X, XII- Palate rises symmetrically. Gag is present. Tongue is in the midline. XI - Shoulder shrugging and head turning intact  MOTOR:  The patient is 5/5 in all four limbs without any drift. Fine finger movements are symmetrical.  Isolated motor group testing reveals no focal abnormalities. Tone is normal.  Sensory examination is intact to pinprick, light touch and position sense testing. Reflexes are 2+ and symmetrical. Plantars are down going. Cerebellar examination reveals no gross ataxia or dysmetria.  Gait is normal.       CBC:   Lab Results   Component Value Date/Time    WBC 7.0 12/07/2018 12:00 AM    RBC 3.88 12/07/2018 12:00 AM    HGB 10.6 (L) 12/07/2018 12:00 AM    HCT 33.6 (L) 12/07/2018 12:00 AM    PLATELET 278 22/10/2130 12:00 AM     BMP:   Lab Results   Component Value Date/Time    Glucose 93 12/07/2018 12:00 AM    Sodium 141 12/07/2018 12:00 AM    Potassium 4.2 12/07/2018 12:00 AM    Chloride 104 12/07/2018 12:00 AM    CO2 24 12/07/2018 12:00 AM    BUN 10 12/07/2018 12:00 AM    Creatinine 0.79 12/07/2018 12:00 AM    Calcium 9.7 12/07/2018 12:00 AM     CMP:   Lab Results   Component Value Date/Time    Glucose 93 12/07/2018 12:00 AM    Sodium 141 12/07/2018 12:00 AM    Potassium 4.2 12/07/2018 12:00 AM    Chloride 104 12/07/2018 12:00 AM    CO2 24 12/07/2018 12:00 AM    BUN 10 12/07/2018 12:00 AM    Creatinine 0.79 12/07/2018 12:00 AM    Calcium 9.7 12/07/2018 12:00 AM    Anion gap 8 12/28/2017 07:06 AM    BUN/Creatinine ratio 13 12/07/2018 12:00 AM    Alk. phosphatase 95 12/07/2018 12:00 AM    Protein, total 7.3 12/07/2018 12:00 AM    Albumin 4.5 12/07/2018 12:00 AM    Globulin 3.6 12/28/2017 07:06 AM    A-G Ratio 1.6 12/07/2018 12:00 AM     Coagulation: No results found for: PTP, INR, APTT, PTTT  Cardiac markers: No results found for: CPK, CKND1, VIOLET     [facility]  OFFICE PROCEDURE PROGRESS NOTE        Chart reviewed for the following:   Ramya Foote MD, have reviewed the History, Physical and updated the Allergic reactions for R Juanjose Nolasco 75 performed immediately prior to start of procedure:   Ramya Foote MD, have performed the following reviews on Jeremiah Rm prior to the start of the procedure:            * Patient was identified by name and date of birth   * Agreement on procedure being performed was verified  * Risks and Benefits explained to the patient  * Procedure site verified and marked as necessary  * Patient was positioned for comfort  * Consent was signed and verified     Time: 4:10 PM    Date of procedure: 4/3/2019    Procedure performed by:  Hira Stovall MD    Provider assisted by: Mia Morris MA    Patient assisted by: self    How tolerated by patient: tolerated the procedure well with no complications    Post Procedural Pain Scale: 0 - No Hurt    Comments: none    Patient: Jeremiah Rm     ID: [id number] Physician: Leo Wood.  Katya Otto MD   Gender: Female Ref Phys: La Perales MD   Handedness: iMa Morris     Study Date: April 3, 2019       Nerve Conduction Studies  Anti Sensory Summary Table     Stim Site NR Peak (ms) Norm Peak (ms) O-P Amp (µV) Norm O-P Amp Dist (cm) Frederick (m/s)   Left Median 2nd Digit Anti Sensory (2nd Digit)   Wrist    5.0 <3.5 31.7 >20 13.0 34.2   Site 2    5.1  33.7      Right Median 2nd Digit Anti Sensory (2nd Digit)   Wrist    5.0 <3.5 20.3 >20 13.0 33.3   Site 2    5.1  20.6      Left Ulnar Anti Sensory (5th Digit )   Wrist    2.5 <3.1 28.4 >17.0 11.0 57.9   Site 2    2.5  31.4      Right Ulnar Anti Sensory (5th Digit )   Wrist    2.6 <3.1 40.6 >17.0 11.0 52.4   Site 2    2.6  43.7        Motor Summary Table     Stim Site NR Onset (ms) Norm Onset (ms) O-P Amp (mV) Norm O-P Amp Dist (cm) Frederick (m/s) Norm Frederick (m/s)   Left Median Motor (Abd Poll Brev)   Wrist    3.8 <4.4 7.7 >4.0 22.0 48.9 >49   Elbow    8.3  6.5       Right Median Motor (Abd Poll Brev)   Wrist    3.6 <4.4 6.8 >4.0 23.0 46.0 >49   Elbow    8.6  5.3       Left Ulnar Motor (Abd Dig Minimi )   Wrist    2.3 <3.3 7.4 >6.0 23.0 79.3 >49   B Elbow    5.2  7.5  13.0 72.2 >50   A Elbow    7.0  8.3       Right Ulnar Motor (Abd Dig Minimi )   Wrist    2.3 <3.3 9.6 >6.0 20.0 69.0 >49   B Elbow    5.2  7.6  12.0 66.7 >50   A Elbow    7.0  10.2           EMG     Side Muscle Nerve Root Ins Act Fibs Psw Fasc Amp Dur Poly Recrt Int Lilli Prader Comment   Right Deltoid Axillary C5-6 Nml Nml Nml None Nml Nml 0 Nml Nml    Right Biceps Musculocut C5-6 Nml Nml Nml None Nml Nml 0 Nml Nml    Right Triceps Radial C6-7-8 Nml Nml Nml None Nml Nml 0 Nml Nml    Right FlexCarRad Median C6-7 Nml Nml Nml None Nml Nml 0 Nml Nml    Right 1stDorInt Ulnar C8-T1 Nml Nml Nml None Nml Nml 0 Nml Nml    Right Abd Poll Brev Median C8-T1 Nml Nml Nml None Nml Nml 0 Nml Nml    Right Cervical Parasp Up Rami C1-3 Nml Nml Nml          Right Cervical Parasp Mid Rami C4-6 Nml Nml Nml          Right Cervical Parasp Low Rami C7-8 Nml Nml Nml            NCS/EMG FINDINGS:     Evaluation of the Left median motor and the Right median motor nerves showed decreased conduction velocity (Elbow-Wrist, L48.9, R46.0 m/s).    The Left ulnar motor, the Right ulnar motor, the Left ulnar sensory, and the Right ulnar sensory nerves were unremarkable.  The Left Median 2nd Digit sensory and the Right Median 2nd Digit sensory nerves showed prolonged distal peak latency (L5.0, R5.0 ms) and decreased conduction velocity (Wrist-2nd Digit, L34.2, R33.3 m/s). INTERPRETATION:       ___________________________  Tara Gilmore. Lorenza Litten MD          Waveforms:                        Impression: This was an abnormal nerve conduction and EMG study showing there to be prolongation of the distal latencies in both median nerves. This is consistent with bilateral carpal tunnel syndrome. PLEASE NOTE:   This document has been produced using voice recognition software. Unrecognized errors in transcription may be present.

## 2019-04-03 NOTE — LETTER
4/3/2019 4:13 PM 
 
Patient:  Carmen Cedeno YOB: 1970 Date of Visit: 4/3/2019 Dear MD Madalyn [address] [place] [address] VIA In Basket 
 : Thank you for referring Ms. Carmen Cedeno to me for evaluation/treatment. Below are the relevant portions of my assessment and plan of care. If you have questions, please do not hesitate to call me. I look forward to following Ms. Katie Gibson along with you.  
 
 
 
Sincerely, 
 
 
Justyn Barnard MD

## 2019-04-03 NOTE — LETTER
4/3/19 Patient: Osiel Mcfadden YOB: 1970 Date of Visit: 4/3/2019 Saira Cardoso MD 
[place] [address] VIA In Basket Dear Saira Cardoso MD, Thank you for referring Ms. Osiel Mcfadden to [facility] for evaluation. My notes for this consultation are attached. If you have questions, please do not hesitate to call me. I look forward to following your patient along with you.  
 
 
Sincerely, 
 
Jennifer Lozano MD

## 2019-04-04 ENCOUNTER — TELEPHONE (OUTPATIENT)
Dept: FAMILY MEDICINE CLINIC | Age: 49
End: 2019-04-04

## 2019-04-04 NOTE — TELEPHONE ENCOUNTER
Patient is requesting (New  Updated) referral to the following office:    Speciality:  Rheumatology  Specialist Name:  Whit Whitney  Office Location: [address]  Phone (if available): [phone]  Fax (if available): [phone]  Diagnosis: all over joint pain.    Date of appointment (if scheduled):  None [facility]

## 2019-04-10 ENCOUNTER — OFFICE VISIT (OUTPATIENT)
Dept: FAMILY MEDICINE CLINIC | Age: 49
End: 2019-04-10

## 2019-04-10 ENCOUNTER — TELEPHONE (OUTPATIENT)
Dept: FAMILY MEDICINE CLINIC | Age: 49
End: 2019-04-10

## 2019-04-10 VITALS
SYSTOLIC BLOOD PRESSURE: 112 MMHG | DIASTOLIC BLOOD PRESSURE: 78 MMHG | WEIGHT: 186 LBS | TEMPERATURE: 98.4 F | OXYGEN SATURATION: 99 % | RESPIRATION RATE: 16 BRPM | BODY MASS INDEX: 31.76 KG/M2 | HEART RATE: 77 BPM | HEIGHT: 64 IN

## 2019-04-10 DIAGNOSIS — G89.29 OTHER CHRONIC PAIN: ICD-10-CM

## 2019-04-10 DIAGNOSIS — D50.8 OTHER IRON DEFICIENCY ANEMIA: ICD-10-CM

## 2019-04-10 DIAGNOSIS — M25.50 MULTIPLE JOINT PAIN: Primary | ICD-10-CM

## 2019-04-10 DIAGNOSIS — M25.60 STIFFNESS IN JOINT: ICD-10-CM

## 2019-04-10 DIAGNOSIS — D53.9 ANEMIA, DEFICIENCY: ICD-10-CM

## 2019-04-10 DIAGNOSIS — M25.50 MULTIPLE JOINT PAIN: ICD-10-CM

## 2019-04-10 DIAGNOSIS — R53.82 CHRONIC FATIGUE: ICD-10-CM

## 2019-04-10 RX ORDER — NAPROXEN 500 MG/1
500 TABLET ORAL
Qty: 60 TAB | Refills: 1 | Status: SHIPPED | OUTPATIENT
Start: 2019-04-10 | End: 2019-06-09 | Stop reason: SDUPTHER

## 2019-04-10 NOTE — PATIENT INSTRUCTIONS
Arthritis: Care Instructions  Your Care Instructions  Arthritis, also called osteoarthritis, is a breakdown of the cartilage that cushions your joints. When the cartilage wears down, your bones rub against each other. This causes pain and stiffness. Many people have some arthritis as they age. Arthritis most often affects the joints of the spine, hands, hips, knees, or feet. You can take simple measures to protect your joints, ease your pain, and help you stay active. Follow-up care is a key part of your treatment and safety. Be sure to make and go to all appointments, and call your doctor if you are having problems. It's also a good idea to know your test results and keep a list of the medicines you take. How can you care for yourself at home? · Stay at a healthy weight. Being overweight puts extra strain on your joints. · Talk to your doctor or physical therapist about exercises that will help ease joint pain. ? Stretch. You may enjoy gentle forms of yoga to help keep your joints and muscles flexible. ? Walk instead of jog. Other types of exercise that are less stressful on the joints include riding a bicycle, swimming, yamel chi, or water exercise. ? Lift weights. Strong muscles help reduce stress on your joints. Stronger thigh muscles, for example, take some of the stress off of the knees and hips. Learn the right way to lift weights so you do not make joint pain worse. · Take your medicines exactly as prescribed. Call your doctor if you think you are having a problem with your medicine. · Take pain medicines exactly as directed. ? If the doctor gave you a prescription medicine for pain, take it as prescribed. ? If you are not taking a prescription pain medicine, ask your doctor if you can take an over-the-counter medicine. · Use a cane, crutch, walker, or another device if you need help to get around. These can help rest your joints.  You also can use other things to make life easier, such as a higher toilet seat and padded handles on kitchen utensils. · Do not sit in low chairs, which can make it hard to get up. · Put heat or cold on your sore joints as needed. Use whichever helps you most. You also can take turns with hot and cold packs. ? Apply heat 2 or 3 times a day for 20 to 30 minutes--using a heating pad, hot shower, or hot pack--to relieve pain and stiffness. ? Put ice or a cold pack on your sore joint for 10 to 20 minutes at a time. Put a thin cloth between the ice and your skin. When should you call for help? Call your doctor now or seek immediate medical care if:    · You have sudden swelling, warmth, or pain in any joint.     · You have joint pain and a fever or rash.     · You have such bad pain that you cannot use a joint.    Watch closely for changes in your health, and be sure to contact your doctor if:    · You have mild joint symptoms that continue even with more than 6 weeks of care at home.     · You have stomach pain or other problems with your medicine. Where can you learn more? Go to http://kosta-michelle.info/. Enter F248 in the search box to learn more about \"Arthritis: Care Instructions. \"  Current as of: Lata 10, 2018  Content Version: 11.9  © 4733-5747 [employer]. Care instructions adapted under license by [employer] (which disclaims liability or warranty for this information). If you have questions about a medical condition or this instruction, always ask your healthcare professional. [place] [address] any warranty or liability for your use of this information.

## 2019-04-10 NOTE — PROGRESS NOTES
Chief Complaint   Patient presents with    Pain (Chronic)     of multiple sites    Joint Pain    Other     affecting her ADL'S     1. Have you been to the ER, urgent care clinic since your last visit? Hospitalized since your last visit? No    2. Have you seen or consulted any other health care providers outside of the [address] [place] [address] since your last visit? Include any pap smears or colon screening.  No

## 2019-04-10 NOTE — PROGRESS NOTES
HISTORY OF PRESENT ILLNESS  Tatum Everett is a 57 y.o. female. HPI: here for follow up. C/o multiple joint and site pain since last few months. Lately getting worse since few weeks. Has been having chronic pain over neck and been following Dr. Kori Francois. Recently diagnosed with bilateral carpal tunnel syndrome as well and following management per Dr. Kori Francois. Lately feeling hand joint stiffness, feels on and off hand joint gets stiff , more in the morning. Not able to say how long. Feels fatigue all the time. Sleep is fair but some days hard to fall a sleep due to pain and she toss and turn. No mood changes. Pain is moderate in intensity. Current medication helping some but effect wears off fast in few hours some days. No headache or dizziness, no chest pain or sob. No palpitation. No diaphoresis. No urinary or bowel complains. No appetite or weight changes. Unable to exercise much due to chronic pain. Said family history of RA. Both parents and one first degree cousins. Not see any recent labs done to evaluate it further . Will order labs. Visit Vitals  /78 (BP 1 Location: Left arm, BP Patient Position: Sitting)   Pulse 77   Temp 98.4 °F (36.9 °C) (Oral)   Resp 16   Ht 5' 4\" (1.626 m)   Wt 186 lb (84.4 kg)   SpO2 99%   BMI 31.93 kg/m²     Review medication list, vitals, problem list,allergies. No known thyroid problem. No joint swelling at this time. Review prior labs.    No results found for: TSH, TSH2, TSH3, TSHP, TSHEXT  Lab Results   Component Value Date/Time    WBC 7.0 12/07/2018 12:00 AM    HGB 10.6 (L) 12/07/2018 12:00 AM    HCT 33.6 (L) 12/07/2018 12:00 AM    PLATELET 527 16/98/4327 12:00 AM    MCV 87 12/07/2018 12:00 AM     Lab Results   Component Value Date/Time    Sodium 141 12/07/2018 12:00 AM    Potassium 4.2 12/07/2018 12:00 AM    Chloride 104 12/07/2018 12:00 AM    CO2 24 12/07/2018 12:00 AM    Anion gap 8 12/28/2017 07:06 AM    Glucose 93 12/07/2018 12:00 AM    BUN 10 12/07/2018 12:00 AM    Creatinine 0.79 12/07/2018 12:00 AM    BUN/Creatinine ratio 13 12/07/2018 12:00 AM    GFR est  12/07/2018 12:00 AM    GFR est non-AA 90 12/07/2018 12:00 AM    Calcium 9.7 12/07/2018 12:00 AM    Bilirubin, total 0.3 12/07/2018 12:00 AM    AST (SGOT) 23 12/07/2018 12:00 AM    Alk. phosphatase 95 12/07/2018 12:00 AM    Protein, total 7.3 12/07/2018 12:00 AM    Albumin 4.5 12/07/2018 12:00 AM    Globulin 3.6 12/28/2017 07:06 AM    A-G Ratio 1.6 12/07/2018 12:00 AM    ALT (SGPT) 16 12/07/2018 12:00 AM     Lab Results   Component Value Date/Time    Cholesterol, total 176 12/07/2018 12:00 AM    HDL Cholesterol 59 12/07/2018 12:00 AM    LDL, calculated 97 12/07/2018 12:00 AM    VLDL, calculated 20 12/07/2018 12:00 AM    Triglyceride 99 12/07/2018 12:00 AM     Lab Results   Component Value Date/Time    Hemoglobin A1c 5.3 12/07/2018 12:00 AM     Noted anemia. She is taking iron supplement and multivitamin. No sob. Has chronic fatigue. ROS: Denies any headache, dizziness, no chest pain or trouble breathing, no arm or leg weakness. No nausea or vomiting, no weight or appetite changes, no mood changes . No urine or bowel complains, no palpitation, no diaphoresis. No abdominal pain. No cold or cough. No leg swelling. No fever. Physical Exam   Constitutional: She is oriented to person, place, and time. No distress. Cardiovascular: Normal heart sounds. Pulmonary/Chest: No respiratory distress. She has no wheezes. Abdominal: Soft. There is no tenderness. Musculoskeletal:   Generalize hand joint discomfort. No poin tenderness. ROM over fingers wnl but discomfort with making a fist.    Neurological: She is oriented to person, place, and time. Psychiatric: Her behavior is normal.       ASSESSMENT and PLAN    ICD-10-CM ICD-9-CM    1. Multiple joint pain: for now adding naproxen. Discussed to take it with food. Discussed medication side effects like GERD, effect on blood pressure and kidney. F/u next visit. Ordered labs. M25.50 719.49 ANTINUCLEAR ANTIBODIES, IFA      SED RATE (ESR)      RHEUMATOID FACTOR, QL      REFERRAL TO RHEUMATOLOGY      CBC W/O DIFF      naproxen (NAPROSYN) 500 mg tablet   2. Chronic fatigue R53.82 780.79 ANTINUCLEAR ANTIBODIES, IFA      SED RATE (ESR)      RHEUMATOID FACTOR, QL      REFERRAL TO RHEUMATOLOGY      CBC W/O DIFF      naproxen (NAPROSYN) 500 mg tablet   3. Stiffness in joint M25.60 719.50 ANTINUCLEAR ANTIBODIES, IFA      SED RATE (ESR)      RHEUMATOID FACTOR, QL      REFERRAL TO RHEUMATOLOGY      naproxen (NAPROSYN) 500 mg tablet   4. Other iron deficiency anemia: recheck cbc. If no improvement will consider hematology referral. Continue iron supplement. D50.8 280.8 IRON PROFILE      VITAMIN B12      CBC W/O DIFF   5. Anemia, deficiency D53.9 281.9 IRON PROFILE      VITAMIN B12      CBC W/O DIFF   6. Other chronic pain: for now multiple site joint pain, more over hand joint pain, moderate intensity pain, chronic fatigue, family history of RA . Will also send her to rheumatology. G89.29 338.29    Pt understood and agree with the plan   Review hM   Follow-up and Dispositions    · Return in about 1 month (around 5/10/2019).

## 2019-04-11 NOTE — TELEPHONE ENCOUNTER
Spoke with patient (all identifiers verified) to advise we will refer her to [facility] for rheumatology consult. Patient was advised she must complete her labs, as rheumatology office will not schedule an appointment without results. She verbalized understanding; stated she will complete labs first thing in the morning. Patient was told an appointment request will be sent once the results come back to [facility]. She verbalized understanding.

## 2019-04-13 ENCOUNTER — HOSPITAL ENCOUNTER (OUTPATIENT)
Dept: LAB | Age: 49
Discharge: HOME | End: 2019-04-13

## 2019-04-13 LAB — XX-LABCORP SPECIMEN COL,LCBCF: NORMAL

## 2019-04-13 PROCEDURE — 99001 SPECIMEN HANDLING PT-LAB: CPT

## 2019-04-15 ENCOUNTER — TELEPHONE (OUTPATIENT)
Dept: FAMILY MEDICINE CLINIC | Age: 49
End: 2019-04-15

## 2019-04-15 LAB
ANA TITR SER IF: NEGATIVE {TITER}
ERYTHROCYTE [DISTWIDTH] IN BLOOD BY AUTOMATED COUNT: 12.9 % (ref 12.3–15.4)
ERYTHROCYTE [SEDIMENTATION RATE] IN BLOOD BY WESTERGREN METHOD: 15 MM/HR (ref 0–32)
HCT VFR BLD AUTO: 33.2 % (ref 34–46.6)
HGB BLD-MCNC: 11.1 G/DL (ref 11.1–15.9)
IRON SATN MFR SERPL: 20 % (ref 15–55)
IRON SERPL-MCNC: 60 UG/DL (ref 27–159)
MCH RBC QN AUTO: 28.9 PG (ref 26.6–33)
MCHC RBC AUTO-ENTMCNC: 33.4 G/DL (ref 31.5–35.7)
MCV RBC AUTO: 87 FL (ref 79–97)
PLATELET # BLD AUTO: 296 X10E3/UL (ref 150–379)
RBC # BLD AUTO: 3.84 X10E6/UL (ref 3.77–5.28)
RHEUMATOID FACT SERPL-ACNC: <10 IU/ML (ref 0–13.9)
TIBC SERPL-MCNC: 302 UG/DL (ref 250–450)
TSH SERPL DL<=0.005 MIU/L-ACNC: 0.45 UIU/ML (ref 0.45–4.5)
UIBC SERPL-MCNC: 242 UG/DL (ref 131–425)
VIT B12 SERPL-MCNC: 506 PG/ML (ref 232–1245)
WBC # BLD AUTO: 6.8 X10E3/UL (ref 3.4–10.8)

## 2019-04-16 ENCOUNTER — OFFICE VISIT (OUTPATIENT)
Dept: ORTHOPEDIC SURGERY | Age: 49
End: 2019-04-16

## 2019-04-16 VITALS
RESPIRATION RATE: 14 BRPM | HEART RATE: 75 BPM | DIASTOLIC BLOOD PRESSURE: 78 MMHG | WEIGHT: 190 LBS | SYSTOLIC BLOOD PRESSURE: 125 MMHG | TEMPERATURE: 98.3 F | HEIGHT: 64 IN | BODY MASS INDEX: 32.44 KG/M2 | OXYGEN SATURATION: 99 %

## 2019-04-16 DIAGNOSIS — M54.12 CERVICAL RADICULOPATHY: ICD-10-CM

## 2019-04-16 DIAGNOSIS — G56.03 BILATERAL CARPAL TUNNEL SYNDROME: ICD-10-CM

## 2019-04-16 DIAGNOSIS — M47.812 CERVICAL SPONDYLOSIS WITHOUT MYELOPATHY: Primary | ICD-10-CM

## 2019-04-16 NOTE — LETTER
4/16/19 Patient: Roxane Mills YOB: 1970 Date of Visit: 4/16/2019 Geovanny Palomares MD 
[place] [address] VIA In Basket Dear Geovanny Palomares MD, Thank you for referring Ms. Roxane Mills to [address] [place] [address] Drive for evaluation. My notes for this consultation are attached. If you have questions, please do not hesitate to call me. I look forward to following your patient along with you. Sincerely, Bijan Campo MD

## 2019-04-16 NOTE — PROGRESS NOTES
[facility]  [address], [place]   Phone: [phone]  Fax: [phone]        PROGRESS NOTE      HISTORY OF PRESENT ILLNESS:  The patient is a 54 y.o. female and was seen today for follow up of BUE pain/paraesthesias extending to the hands involving all digits. Pt denies neck pain at this time. She additionally endorses bilateral shoulder and knee pain. She was initially seen with primary complaint of progressive neck pain extending into the bilateral shoulders and the BUE (R=L) with paraesthesias to all digits in the bilateral hands since October 2018. She really has widespread pain complaints. Pt is accompanied by her mother today. She denies specific injury or trauma. Her pain is not worsened positionally. She denies a hx of cervical surgery, injections. Pt completed PT with good benefit. She has treated with MDP and Flexeril without relief. Pt denies dropping things or loss of balance. Patient denies history of glaucoma. She denies possibility of pregnancy or breastfeeding (hx pf tubal ligation). Pt denies fever, weight loss, or skin changes. The patient is RHD. ER note from Preston Otoole dated 12/16/18 indicating patient presented for neck pain into the BUE to the hands x a few weeks. She was neurologically intact, and no treatment was noted. Note from Mary Fajardo MD dated 1/15/19 indicating patient was seen with c/o neck pain with recent worsening. No UE paraesthesias at that time. Cervical spine XR dated 12/16/18 reviewed. Per report, mild to moderate DJD at C5-C6 and C6-C7. At her last clinical appointment, the patient returned with BUE pain/paraesthesias extending to the hands involving all digits. Pt denied neck pain at that time. She additionally endorsed bilateral knee and shoulder pain. I referred her to neurology for an EMG of the BUE. I tried her on Neurontin 300 mg TID. I recommended she increase the frequency of HEP to daily. The patient returns today with pain location and distribution remain unchanged. She continues to rate her pain 8/10. Pt is not tolerating Neurontin 300 mg TID secondary to lethargy. She is performing her HEP daily. Pt denies dropping things or loss of balance. Note from Stefani Benavides MD dated 4/10/18 indicating patient was seen with c/o multiple joint pain complaints. Pt feels hand and joint pain stiffness, worse in the morning. Family h/o RA. Referred to rheumatology. A BUE EMG dated 4/3/19 was suggestive of an abnormal nerve conduction and EMG study showing there to be prolongation of the distal latencies in both median nerves. This is consistent with bilateral carpal tunnel syndrome.  reviewed. Body mass index is 32.61 kg/m².       PCP: Stefani Benavides MD      Past Medical History:   Diagnosis Date    Anemia     Arthritis     Fibroids     uterine fibroids        Social History     Socioeconomic History    Marital status: SINGLE     Spouse name: Not on file    Number of children: Not on file    Years of education: Not on file    Highest education level: Not on file   Occupational History    Not on file   Social Needs    Financial resource strain: Not on file    Food insecurity:     Worry: Not on file     Inability: Not on file    Transportation needs:     Medical: Not on file     Non-medical: Not on file   Tobacco Use    Smoking status: Never Smoker    Smokeless tobacco: Never Used   Substance and Sexual Activity    Alcohol use: No    Drug use: No    Sexual activity: Not Currently     Partners: Male   Lifestyle    Physical activity:     Days per week: Not on file     Minutes per session: Not on file    Stress: Not on file   Relationships    Social connections:     Talks on phone: Not on file     Gets together: Not on file     Attends Church service: Not on file     Active member of club or organization: Not on file     Attends meetings of clubs or organizations: Not on file Relationship status: Not on file    Intimate partner violence:     Fear of current or ex partner: Not on file     Emotionally abused: Not on file     Physically abused: Not on file     Forced sexual activity: Not on file   Other Topics Concern    Not on file   Social History Narrative    Not on file       Current Outpatient Medications   Medication Sig Dispense Refill    gabapentin (NEURONTIN) 300 mg capsule Take 1 Cap by mouth three (3) times daily. 90 Cap 1    topiramate (TOPAMAX) 25 mg tablet 3 tabs PO QHS 90 Tab 1    naproxen (NAPROSYN) 500 mg tablet Take 1 Tab by mouth two (2) times daily as needed (pain). 60 Tab 1    cyclobenzaprine (FLEXERIL) 5 mg tablet Take 2 Tabs by mouth three (3) times daily. 9 Tab 0       No Known Allergies       PHYSICAL EXAMINATION    Visit Vitals  /78 (BP 1 Location: Left arm)   Pulse 75   Temp 98.3 °F (36.8 °C)   Resp 14   Ht 5' 4\" (1.626 m)   Wt 190 lb (86.2 kg)   LMP 01/15/2016   SpO2 99%   BMI 32.61 kg/m²       CONSTITUTIONAL: NAD, A&O x 3  SENSATION: Intact to light touch throughout  NEURO: Niko's is negative bilaterally. RANGE OF MOTION: The patient has full passive range of motion in all four extremities. Shoulder AB/Flex Elbow Flex Wrist Ext Elbow Ext Wrist Flex Hand Intrin Tone   Right +4/5 +4/5 +4/5 +4/5 +4/5 +4/5 +4/5   Left +4/5 +4/5 +4/5 +4/5 +4/5 +4/5 +4/5                 ASSESSMENT   Diagnoses and all orders for this visit:    1. Cervical spondylosis without myelopathy    2. Cervical radiculopathy    3. Bilateral carpal tunnel syndrome          IMPRESSION AND PLAN:  The patient does not think her remaining pain complaints are severe enough to warrant additional workup/treatment at this time. I recommend she continue to perform her HEP daily. BUE EMG revealed bilateral CTS, which appears to be her primary source of pain at this time. I will refer her to Dr. Fabián Cox for further treatment and evaluation of this. Patient is neurologically intact.  I will see the patient back prn. Written by Zoie Do, as dictated by Blaze Young MD  I examined the patient, reviewed and agree with the note.

## 2019-04-16 NOTE — TELEPHONE ENCOUNTER
Patient identified with 2 identifiers (name and ). Spoke with patient and she is aware that Dr. Pamella Moore is not in the office. Once Dr. Pamella Moore reviews labs I will give her a call.

## 2019-04-17 NOTE — PROGRESS NOTES
Let pt know that low tsh. For now keep an appt to discuss labs next week. Arthritic labs are negative.  Further discussion on follow up visit

## 2019-04-17 NOTE — PROGRESS NOTES
Patient identified with 2 identifiers (name and ). Patient aware of     Let pt know that low tsh. For now keep an appt to discuss labs next week. Arthritic labs are negative.  Further discussion on follow up visit

## 2019-04-22 ENCOUNTER — OFFICE VISIT (OUTPATIENT)
Dept: ORTHOPEDIC SURGERY | Facility: CLINIC | Age: 49
End: 2019-04-22

## 2019-04-22 VITALS
TEMPERATURE: 97.7 F | SYSTOLIC BLOOD PRESSURE: 104 MMHG | OXYGEN SATURATION: 97 % | WEIGHT: 187 LBS | HEIGHT: 64 IN | BODY MASS INDEX: 31.92 KG/M2 | DIASTOLIC BLOOD PRESSURE: 69 MMHG | HEART RATE: 80 BPM

## 2019-04-22 DIAGNOSIS — G56.01 CARPAL TUNNEL SYNDROME OF RIGHT WRIST: Primary | ICD-10-CM

## 2019-04-22 DIAGNOSIS — G56.02 CARPAL TUNNEL SYNDROME OF LEFT WRIST: ICD-10-CM

## 2019-04-22 NOTE — PATIENT INSTRUCTIONS
Carpal Tunnel Syndrome: Care Instructions  Your Care Instructions    Carpal tunnel syndrome is a nerve problem. It can cause tingling, numbness, weakness, or pain in the fingers, thumb, and hand. The median nerve and several tough tissues called tendons run through a space in the wrist called the carpal tunnel. The repeated hand motions used in work and some hobbies and sports can put pressure on the nerve. Pregnancy and several conditions, including diabetes, arthritis, and an underactive thyroid, also can cause carpal tunnel syndrome. You may be able to limit an activity or do it differently to reduce your symptoms. You also can take other steps to feel better. If your symptoms are mild, 1 to 2 weeks of home treatment are likely to ease your pain. Surgery is needed only if other treatments do not work. Follow-up care is a key part of your treatment and safety. Be sure to make and go to all appointments, and call your doctor if you are having problems. It's also a good idea to know your test results and keep a list of the medicines you take. How can you care for yourself at home? · If possible, stop or reduce the activity that causes your symptoms. If you cannot stop the activity, take frequent breaks to rest and stretch or change hand positions to do a task. Try switching hands, such as when using a computer mouse. · Try to avoid bending or twisting your wrists. · Ask your doctor if you can take an over-the-counter pain medicine, such as acetaminophen (Tylenol), ibuprofen (Advil, Motrin), or naproxen (Aleve). Be safe with medicines. Read and follow all instructions on the label. · If your doctor prescribes corticosteroid medicine to help reduce pain and swelling, take it exactly as prescribed. Call your doctor if you think you are having a problem with your medicine. · Put ice or a cold pack on your wrist for 10 to 20 minutes at a time to ease pain.  Put a thin cloth between the ice and your skin.  · If your doctor or your physical or occupational therapist tells you to wear a wrist splint, wear it as directed to keep your wrist in a neutral position. This also eases pressure on your median nerve. · Ask your doctor whether you should have physical or occupational therapy to learn how to do tasks differently. · Try a yoga class to stretch your muscles and build strength in your hands and wrists. Yoga has been shown to ease carpal tunnel symptoms. To prevent carpal tunnel  · When working at a computer, keep your hands and wrists in line with your forearms. Hold your elbows close to your sides. Take a break every 10 to 15 minutes. · Try these exercises:  ? Warm up: Rotate your wrist up, down, and from side to side. Repeat this 4 times. Stretch your fingers far apart, relax them, then stretch them again. Repeat 4 times. Stretch your thumb by pulling it back gently, holding it, and then releasing it. Repeat 4 times. ? Prayer stretch: Start with your palms together in front of your chest just below your chin. Slowly lower your hands toward your waistline while keeping your hands close to your stomach and your palms together until you feel a mild to moderate stretch under your forearms. Hold for 10 to 20 seconds. Repeat 4 times. ? Wrist flexor stretch: Hold your arm in front of you with your palm up. Bend your wrist, pointing your hand toward the floor. With your other hand, gently bend your wrist further until you feel a mild to moderate stretch in your forearm. Hold for 10 to 20 seconds. Repeat 4 times. ? Wrist extensor stretch: Repeat the steps for the wrist flexor stretch, but begin with your extended hand palm down. · Squeeze a rubber exercise ball several times a day to keep your hands and fingers strong. · Avoid holding objects (such as a book) in one position for a long time. When possible, use your whole hand to grasp an object.  Using just the thumb and index finger can put stress on the wrist.  · Do not smoke. It can make this condition worse by reducing blood flow to the median nerve. If you need help quitting, talk to your doctor about stop-smoking programs and medicines. These can increase your chances of quitting for good. When should you call for help? Watch closely for changes in your health, and be sure to contact your doctor if:    · Your pain or other problems do not get better with home care.     · You want more information about physical or occupational therapy.     · You have side effects of your corticosteroid medicine, such as:  ? Weight gain. ? Mood changes. ? Trouble sleeping. ? Bruising easily.     · You have any other problems with your medicine. Where can you learn more? Go to http://kosta-michelle.info/. Enter R432 in the search box to learn more about \"Carpal Tunnel Syndrome: Care Instructions. \"  Current as of: September 20, 2018  Content Version: 11.9  © 6919-2839 [employer]. Care instructions adapted under license by [employer] (which disclaims liability or warranty for this information). If you have questions about a medical condition or this instruction, always ask your healthcare professional. [place] [address] any warranty or liability for your use of this information.

## 2019-04-22 NOTE — PROGRESS NOTES
Gabriel Ross is a 54 y.o. female right handed individual, not currently working. Worker's Compensation and legal considerations: not known. Vitals:    04/22/19 1316   BP: 104/69   Pulse: 80   Temp: 97.7 °F (36.5 °C)   SpO2: 97%   Weight: 187 lb (84.8 kg)   Height: 5' 4\" (1.626 m)   LMP: 01/15/2016           Chief Complaint   Patient presents with    Hand Pain     Bilateral hand pain         HPI: Patient comes in today with complaints of bilateral hand numbness and tingling that was recently diagnosed as positive for carpal tunnel syndrome bilaterally. She reports that in January when she was lifting something while working at [employer] she noticed pain and numbness in both hands that has not relented. She denies any specific injuries. Date of onset:  1/2019    Injury: No    Prior Treatment:  No    Numbness/ Tingling: Yes: Comment: Bilateral hands    ROS: Review of Systems - General ROS: negative  Respiratory ROS: no cough, shortness of breath, or wheezing  Cardiovascular ROS: no chest pain or dyspnea on exertion  Musculoskeletal ROS: positive for - pain in hand - bilateral  Neurological ROS: positive for - numbness/tingling  Dermatological ROS: negative    Past Medical History:   Diagnosis Date    Anemia     Arthritis     Fibroids     uterine fibroids       Past Surgical History:   Procedure Laterality Date    HX HYSTERECTOMY      HX OTHER SURGICAL      surgery for punctured intestine    HX OVARIAN CYST REMOVAL Bilateral     HX TUBAL LIGATION         Current Outpatient Medications   Medication Sig Dispense Refill    triamcinolone acetonide (KENALOG) 10 mg/mL injection 1 mL by Intra artICUlar route once for 1 dose. 1 Vial 0    triamcinolone acetonide (KENALOG) 10 mg/mL injection 1 mL by Intra artICUlar route once for 1 dose. 1 Vial 0    naproxen (NAPROSYN) 500 mg tablet Take 1 Tab by mouth two (2) times daily as needed (pain).  60 Tab 1    topiramate (TOPAMAX) 25 mg tablet 3 tabs PO QHS 90 Tab 1    cyclobenzaprine (FLEXERIL) 5 mg tablet Take 2 Tabs by mouth three (3) times daily. 9 Tab 0    gabapentin (NEURONTIN) 300 mg capsule Take 1 Cap by mouth three (3) times daily. 90 Cap 1       No Known Allergies      PE:     NEUROVASCULAR    Examination L R Examination L R   Carpal Comp. + + Pronator Comp. - -   Carpal Tinel + + Pronator Tinel - -   Phalen's + + Pronator Stress - -   Cubital Comp. - - Guyon Comp. - -   Cubital Tinel - - Guyon Tinel - -   Elbow Hyperflexion - - Adson's - -   Spurling's - - SC Comp. - -   PCB Median abn - - SC Tinel - -   Radial Tinel - - IC Comp. - -   Digital Tinel - - IC Tinel - -   Radial 2-Pt WNL WNL Ulnar 2-Pt WNL WNL     Radial Pulse: 2+  Capillary Refill: < 2 sec  John: Not Performed  Digital John: Not Performed    Imaging: None Indicated today    NCS/EMG FINDINGS:     · Evaluation of the Left median motor and the Right median motor nerves showed decreased conduction velocity (Elbow-Wrist, L48.9, R46.0 m/s). · The Left ulnar motor, the Right ulnar motor, the Left ulnar sensory, and the Right ulnar sensory nerves were unremarkable. · The Left Median 2nd Digit sensory and the Right Median 2nd Digit sensory nerves showed prolonged distal peak latency (L5.0, R5.0 ms) and decreased conduction velocity (Wrist-2nd Digit, L34.2, R33.3 m/s).       INTERPRETATION:         Impression: This was an abnormal nerve conduction and EMG study showing there to be prolongation of the distal latencies in both median nerves. This is consistent with bilateral carpal tunnel syndrome. ICD-10-CM ICD-9-CM    1. Carpal tunnel syndrome of right wrist G56.01 354.0 AMB SUPPLY ORDER      TRIAMCINOLONE ACETONIDE INJ      triamcinolone acetonide (KENALOG) 10 mg/mL injection      INJECT CARPAL TUNNEL   2.  Carpal tunnel syndrome of left wrist G56.02 354.0 AMB SUPPLY ORDER      TRIAMCINOLONE ACETONIDE INJ      triamcinolone acetonide (KENALOG) 10 mg/mL injection      INJECT CARPAL TUNNEL Plan:     Bilateral carpal tunnel injections    Bilateral carpal tunnel braces for consistent night wear    Follow-up in 3 months for reevaluation and to discuss surgical options. Plan was reviewed with patient, who verbalized agreement and understanding of the plan    Halina [address] NOTE        Chart reviewed for the following:   Esdras WALTON DO, have reviewed the History, Physical and updated the Allergic reactions for R Juanjose Nolasco 75 performed immediately prior to start of procedure:   Esdras WALTON DO, have performed the following reviews on Wanna Cornea prior to the start of the procedure:            * Patient was identified by name and date of birth   * Agreement on procedure being performed was verified  * Risks and Benefits explained to the patient  * Procedure site verified and marked as necessary  * Patient was positioned for comfort  * Consent was signed and verified     Time: 13:50      Date of procedure: 4/22/2019    Procedure performed by: Jordyn Krishna DO    Provider assisted by: Nat Lara LPN    Patient assisted by: self    How tolerated by patient: tolerated the procedure well with no complications    Post Procedural Pain Scale: 0 - No Hurt    Comments: none    Procedure:  After consent was obtained, using sterile technique the carpal tunnel was prepped. Local anesthetic used: 1% lidocaine. Kenalog 5 mg X2 and was then injected and the needle withdrawn. The procedure was well tolerated. The patient is asked to continue to rest the area for a few more days before resuming regular activities. It may be more painful for the first 1-2 days. Watch for fever, or increased swelling or persistent pain in the joint. Call or return to clinic prn if such symptoms occur or there is failure to improve as anticipated.

## 2019-05-10 ENCOUNTER — OFFICE VISIT (OUTPATIENT)
Dept: FAMILY MEDICINE CLINIC | Age: 49
End: 2019-05-10

## 2019-05-10 VITALS
TEMPERATURE: 98.3 F | HEIGHT: 64 IN | DIASTOLIC BLOOD PRESSURE: 60 MMHG | RESPIRATION RATE: 20 BRPM | WEIGHT: 186 LBS | HEART RATE: 66 BPM | BODY MASS INDEX: 31.76 KG/M2 | OXYGEN SATURATION: 98 % | SYSTOLIC BLOOD PRESSURE: 100 MMHG

## 2019-05-10 DIAGNOSIS — R79.89 LOW TSH LEVEL: ICD-10-CM

## 2019-05-10 DIAGNOSIS — R52 PAIN OF MULTIPLE SITES: ICD-10-CM

## 2019-05-10 DIAGNOSIS — R53.82 CHRONIC FATIGUE: Primary | ICD-10-CM

## 2019-05-10 DIAGNOSIS — G56.03 BILATERAL CARPAL TUNNEL SYNDROME: ICD-10-CM

## 2019-05-10 RX ORDER — DULOXETIN HYDROCHLORIDE 30 MG/1
30 CAPSULE, DELAYED RELEASE ORAL DAILY
Qty: 30 CAP | Refills: 1 | Status: SHIPPED | OUTPATIENT
Start: 2019-05-10 | End: 2019-07-09 | Stop reason: SDUPTHER

## 2019-05-10 NOTE — PROGRESS NOTES
Gabriel Ross is a 54 y.o. female (: 1970) presenting to address: Chief Complaint Patient presents with  Follow-up Concerned about her carpal tunnel. Cortisone shots given in bilat hands, but were ineffective. Not taking any medications at this time. Medication list and allergies have been reviewed with Gabriel Ross and updated as of today's date. I have gone over all Medical, Surgical and Social History with Gabriel Ross and updated/added the information accordingly. 1. Have you been to the ER, Urgent Care or Hospitalized since your last visit? NO 
 
 
2. Have you followed up with your PCP or any other Physicians since your procedure/ last office visit? YES, Dr. Maribel Miller for carpal tunnel 3 most recent PHQ Screens 5/10/2019 Little interest or pleasure in doing things Not at all Feeling down, depressed, irritable, or hopeless Not at all Total Score PHQ 2 0 VORB: No orders of the defined types were placed in this encounter. 
Bunny Silva MD/Wei Jorgensen

## 2019-05-10 NOTE — PATIENT INSTRUCTIONS
Tylenol or motrin with food for pain alternate Fatigue: Care Instructions Your Care Instructions Fatigue is a feeling of tiredness, exhaustion, or lack of energy. You may feel fatigue because of too much or not enough activity. It can also come from stress, lack of sleep, boredom, and poor diet. Many medical problems, such as viral infections, can cause fatigue. Emotional problems, especially depression, are often the cause of fatigue. Fatigue is most often a symptom of another problem. Treatment for fatigue depends on the cause. For example, if you have fatigue because you have a certain health problem, treating this problem also treats your fatigue. If depression or anxiety is the cause, treatment may help. Follow-up care is a key part of your treatment and safety. Be sure to make and go to all appointments, and call your doctor if you are having problems. It's also a good idea to know your test results and keep a list of the medicines you take. How can you care for yourself at home? · Get regular exercise. But don't overdo it. Go back and forth between rest and exercise. · Get plenty of rest. 
· Eat a healthy diet. Do not skip meals, especially breakfast. 
· Reduce your use of caffeine, tobacco, and alcohol. Caffeine is most often found in coffee, tea, cola drinks, and chocolate. · Limit medicines that can cause fatigue. This includes tranquilizers and cold and allergy medicines. When should you call for help? Watch closely for changes in your health, and be sure to contact your doctor if: 
  · You have new symptoms such as fever or a rash.  
  · Your fatigue gets worse.  
  · You have been feeling down, depressed, or hopeless. Or you may have lost interest in things that you usually enjoy.  
  · You are not getting better as expected. Where can you learn more? Go to http://kosta-michelle.info/. Enter C831 in the search box to learn more about \"Fatigue: Care Instructions. \" 
 Current as of: September 23, 2018 Content Version: 11.9 © 8197-2552 [employer]. Care instructions adapted under license by [employer] (which disclaims liability or warranty for this information). If you have questions about a medical condition or this instruction, always ask your healthcare professional. [address] any warranty or liability for your use of this information. Carpal Tunnel Syndrome: Care Instructions Your Care Instructions Carpal tunnel syndrome is a nerve problem. It can cause tingling, numbness, weakness, or pain in the fingers, thumb, and hand. The median nerve and several tough tissues called tendons run through a space in the wrist called the carpal tunnel. The repeated hand motions used in work and some hobbies and sports can put pressure on the nerve. Pregnancy and several conditions, including diabetes, arthritis, and an underactive thyroid, also can cause carpal tunnel syndrome. You may be able to limit an activity or do it differently to reduce your symptoms. You also can take other steps to feel better. If your symptoms are mild, 1 to 2 weeks of home treatment are likely to ease your pain. Surgery is needed only if other treatments do not work. Follow-up care is a key part of your treatment and safety. Be sure to make and go to all appointments, and call your doctor if you are having problems. It's also a good idea to know your test results and keep a list of the medicines you take. How can you care for yourself at home? · If possible, stop or reduce the activity that causes your symptoms. If you cannot stop the activity, take frequent breaks to rest and stretch or change hand positions to do a task. Try switching hands, such as when using a computer mouse. · Try to avoid bending or twisting your wrists.  
· Ask your doctor if you can take an over-the-counter pain medicine, such as acetaminophen (Tylenol), ibuprofen (Advil, Motrin), or naproxen (Aleve). Be safe with medicines. Read and follow all instructions on the label. · If your doctor prescribes corticosteroid medicine to help reduce pain and swelling, take it exactly as prescribed. Call your doctor if you think you are having a problem with your medicine. · Put ice or a cold pack on your wrist for 10 to 20 minutes at a time to ease pain. Put a thin cloth between the ice and your skin. · If your doctor or your physical or occupational therapist tells you to wear a wrist splint, wear it as directed to keep your wrist in a neutral position. This also eases pressure on your median nerve. · Ask your doctor whether you should have physical or occupational therapy to learn how to do tasks differently. · Try a yoga class to stretch your muscles and build strength in your hands and wrists. Yoga has been shown to ease carpal tunnel symptoms. To prevent carpal tunnel · When working at a computer, keep your hands and wrists in line with your forearms. Hold your elbows close to your sides. Take a break every 10 to 15 minutes. · Try these exercises: 
? Warm up: Rotate your wrist up, down, and from side to side. Repeat this 4 times. Stretch your fingers far apart, relax them, then stretch them again. Repeat 4 times. Stretch your thumb by pulling it back gently, holding it, and then releasing it. Repeat 4 times. ? Prayer stretch: Start with your palms together in front of your chest just below your chin. Slowly lower your hands toward your waistline while keeping your hands close to your stomach and your palms together until you feel a mild to moderate stretch under your forearms. Hold for 10 to 20 seconds. Repeat 4 times. ? Wrist flexor stretch: Hold your arm in front of you with your palm up. Bend your wrist, pointing your hand toward the floor.  With your other hand, gently bend your wrist further until you feel a mild to moderate stretch in your forearm. Hold for 10 to 20 seconds. Repeat 4 times. ? Wrist extensor stretch: Repeat the steps for the wrist flexor stretch, but begin with your extended hand palm down. · Squeeze a rubber exercise ball several times a day to keep your hands and fingers strong. · Avoid holding objects (such as a book) in one position for a long time. When possible, use your whole hand to grasp an object. Using just the thumb and index finger can put stress on the wrist. 
· Do not smoke. It can make this condition worse by reducing blood flow to the median nerve. If you need help quitting, talk to your doctor about stop-smoking programs and medicines. These can increase your chances of quitting for good. When should you call for help? Watch closely for changes in your health, and be sure to contact your doctor if: 
  · Your pain or other problems do not get better with home care.  
  · You want more information about physical or occupational therapy.  
  · You have side effects of your corticosteroid medicine, such as: 
? Weight gain. ? Mood changes. ? Trouble sleeping. ? Bruising easily.  
  · You have any other problems with your medicine. Where can you learn more? Go to http://kosta-michelle.info/. Enter R432 in the search box to learn more about \"Carpal Tunnel Syndrome: Care Instructions. \" Current as of: September 20, 2018 Content Version: 11.9 © 4663-7865 [employer]. Care instructions adapted under license by [employer] (which disclaims liability or warranty for this information). If you have questions about a medical condition or this instruction, always ask your healthcare professional. [place] [address] any warranty or liability for your use of this information. Learning About the 0-to-10 Pain Scale What is the 0-to-10 pain scale? Everyone feels pain differently.  A pain scale is one way for a person to measure his or her pain so that the doctor can plan how best to manage it. The pain scale helps the doctor keep track of how well your treatment plan is working to reduce your pain and help you do daily tasks. Most pain scales use numbers from 0 to 10. A score of 0 means no pain, and 10 means the worst pain you have ever known. Your medical team will help you manage your pain in a variety of ways. Pain management may include changing your position, using ice or heat, or taking medicine. The pain scale · 0 = No pain. · 1 = Pain is very mild, barely noticeable. Most of the time you don't think about it. · 2 = Minor pain. It's annoying. You may have stronger twinges now and then. · 3 = Noticeable pain. It may distract you, but you can get used to it. · 4 = Moderate pain. If you are involved in an activity, you're able to ignore the pain for a while. But it is still distracting. · 5 = Moderately strong pain. You can't ignore it for more than a few minutes. But with effort you can still work or do some social activities. · 6 = Moderately stronger pain. You avoid some of your normal daily activities. You have trouble concentrating. · 7 = Strong pain. It keeps you from doing normal activities. · 8 = Very strong pain. It's hard to do anything at all. · 9 = Pain that is very hard to bear. You can't carry on a conversation. · 10 = Worst pain possible. Follow-up care is a key part of your treatment and safety. Be sure to make and go to all appointments, and call your doctor if you are having problems. It's also a good idea to know your test results and keep a list of the medicines you take. Where can you learn more? Go to http://kosta-michelle.info/. Enter S427 in the search box to learn more about \"Learning About the 0-to-10 Pain Scale. \" Current as of: Lata 3, 2018 Content Version: 11.9 © 7837-5947 [employer].  Care instructions adapted under license by [address] (which disclaims liability or warranty for this information). If you have questions about a medical condition or this instruction, always ask your healthcare professional. [address] any warranty or liability for your use of this information.

## 2019-05-21 ENCOUNTER — HOSPITAL ENCOUNTER (EMERGENCY)
Age: 49
Discharge: HOME | End: 2019-05-21
Attending: EMERGENCY MEDICINE
Payer: MEDICAID

## 2019-05-21 ENCOUNTER — APPOINTMENT (OUTPATIENT)
Dept: GENERAL RADIOLOGY | Age: 49
End: 2019-05-21
Attending: PHYSICIAN ASSISTANT
Payer: MEDICAID

## 2019-05-21 ENCOUNTER — APPOINTMENT (OUTPATIENT)
Dept: CT IMAGING | Age: 49
End: 2019-05-21
Attending: PHYSICIAN ASSISTANT
Payer: MEDICAID

## 2019-05-21 VITALS
OXYGEN SATURATION: 98 % | SYSTOLIC BLOOD PRESSURE: 126 MMHG | TEMPERATURE: 98.3 F | BODY MASS INDEX: 28.93 KG/M2 | DIASTOLIC BLOOD PRESSURE: 68 MMHG | HEART RATE: 67 BPM | HEIGHT: 66 IN | WEIGHT: 180 LBS | RESPIRATION RATE: 17 BRPM

## 2019-05-21 DIAGNOSIS — R07.9 CHEST PAIN, UNSPECIFIED TYPE: ICD-10-CM

## 2019-05-21 DIAGNOSIS — R51.9 ACUTE NONINTRACTABLE HEADACHE, UNSPECIFIED HEADACHE TYPE: Primary | ICD-10-CM

## 2019-05-21 LAB
ALBUMIN SERPL-MCNC: 3.6 G/DL (ref 3.4–5)
ALBUMIN/GLOB SERPL: 1 {RATIO} (ref 0.8–1.7)
ALP SERPL-CCNC: 93 U/L (ref 45–117)
ALT SERPL-CCNC: 27 U/L (ref 13–56)
ANION GAP SERPL CALC-SCNC: 6 MMOL/L (ref 3–18)
APPEARANCE UR: CLEAR
AST SERPL-CCNC: 17 U/L (ref 15–37)
BASOPHILS # BLD: 0 K/UL (ref 0–0.1)
BASOPHILS NFR BLD: 0 % (ref 0–2)
BILIRUB SERPL-MCNC: 0.2 MG/DL (ref 0.2–1)
BILIRUB UR QL: NEGATIVE
BUN SERPL-MCNC: 12 MG/DL (ref 7–18)
BUN/CREAT SERPL: 12 (ref 12–20)
CALCIUM SERPL-MCNC: 8.8 MG/DL (ref 8.5–10.1)
CHLORIDE SERPL-SCNC: 105 MMOL/L (ref 100–108)
CK MB CFR SERPL CALC: 0.7 % (ref 0–4)
CK MB CFR SERPL CALC: 0.7 % (ref 0–4)
CK MB SERPL-MCNC: 2.5 NG/ML (ref 5–25)
CK MB SERPL-MCNC: 3.1 NG/ML (ref 5–25)
CK SERPL-CCNC: 376 U/L (ref 26–192)
CK SERPL-CCNC: 465 U/L (ref 26–192)
CO2 SERPL-SCNC: 31 MMOL/L (ref 21–32)
COLOR UR: YELLOW
CREAT SERPL-MCNC: 1.02 MG/DL (ref 0.6–1.3)
DIFFERENTIAL METHOD BLD: ABNORMAL
EOSINOPHIL # BLD: 0.1 K/UL (ref 0–0.4)
EOSINOPHIL NFR BLD: 1 % (ref 0–5)
EPITH CASTS URNS QL MICRO: NORMAL /LPF (ref 0–5)
ERYTHROCYTE [DISTWIDTH] IN BLOOD BY AUTOMATED COUNT: 11.9 % (ref 11.6–14.5)
GLOBULIN SER CALC-MCNC: 3.7 G/DL (ref 2–4)
GLUCOSE SERPL-MCNC: 77 MG/DL (ref 74–99)
GLUCOSE UR STRIP.AUTO-MCNC: NEGATIVE MG/DL
HCT VFR BLD AUTO: 35.3 % (ref 35–45)
HGB BLD-MCNC: 11.3 G/DL (ref 12–16)
HGB UR QL STRIP: ABNORMAL
KETONES UR QL STRIP.AUTO: NEGATIVE MG/DL
LEUKOCYTE ESTERASE UR QL STRIP.AUTO: NEGATIVE
LYMPHOCYTES # BLD: 3 K/UL (ref 0.9–3.6)
LYMPHOCYTES NFR BLD: 36 % (ref 21–52)
MCH RBC QN AUTO: 28 PG (ref 24–34)
MCHC RBC AUTO-ENTMCNC: 32 G/DL (ref 31–37)
MCV RBC AUTO: 87.6 FL (ref 74–97)
MONOCYTES # BLD: 0.9 K/UL (ref 0.05–1.2)
MONOCYTES NFR BLD: 10 % (ref 3–10)
NEUTS SEG # BLD: 4.3 K/UL (ref 1.8–8)
NEUTS SEG NFR BLD: 53 % (ref 40–73)
NITRITE UR QL STRIP.AUTO: NEGATIVE
PH UR STRIP: 6 [PH] (ref 5–8)
PLATELET # BLD AUTO: 281 K/UL (ref 135–420)
PMV BLD AUTO: 10.9 FL (ref 9.2–11.8)
POTASSIUM SERPL-SCNC: 3.6 MMOL/L (ref 3.5–5.5)
PROT SERPL-MCNC: 7.3 G/DL (ref 6.4–8.2)
PROT UR STRIP-MCNC: NEGATIVE MG/DL
RBC # BLD AUTO: 4.03 M/UL (ref 4.2–5.3)
RBC #/AREA URNS HPF: NORMAL /HPF (ref 0–5)
SODIUM SERPL-SCNC: 142 MMOL/L (ref 136–145)
SP GR UR REFRACTOMETRY: 1.01 (ref 1–1.03)
TROPONIN I SERPL-MCNC: <0.02 NG/ML (ref 0–0.04)
TROPONIN I SERPL-MCNC: <0.02 NG/ML (ref 0–0.04)
UROBILINOGEN UR QL STRIP.AUTO: 0.2 EU/DL (ref 0.2–1)
WBC # BLD AUTO: 8.1 K/UL (ref 4.6–13.2)
WBC URNS QL MICRO: NORMAL /HPF (ref 0–4)

## 2019-05-21 PROCEDURE — 74011250637 HC RX REV CODE- 250/637: Performed by: PHYSICIAN ASSISTANT

## 2019-05-21 PROCEDURE — 74011250636 HC RX REV CODE- 250/636: Performed by: PHYSICIAN ASSISTANT

## 2019-05-21 PROCEDURE — 99283 EMERGENCY DEPT VISIT LOW MDM: CPT

## 2019-05-21 PROCEDURE — 96361 HYDRATE IV INFUSION ADD-ON: CPT

## 2019-05-21 PROCEDURE — 93005 ELECTROCARDIOGRAM TRACING: CPT

## 2019-05-21 PROCEDURE — 81001 URINALYSIS AUTO W/SCOPE: CPT

## 2019-05-21 PROCEDURE — 96374 THER/PROPH/DIAG INJ IV PUSH: CPT

## 2019-05-21 PROCEDURE — 82550 ASSAY OF CK (CPK): CPT

## 2019-05-21 PROCEDURE — 85025 COMPLETE CBC W/AUTO DIFF WBC: CPT

## 2019-05-21 PROCEDURE — 96375 TX/PRO/DX INJ NEW DRUG ADDON: CPT

## 2019-05-21 PROCEDURE — 71045 X-RAY EXAM CHEST 1 VIEW: CPT

## 2019-05-21 PROCEDURE — 70450 CT HEAD/BRAIN W/O DYE: CPT

## 2019-05-21 PROCEDURE — 80053 COMPREHEN METABOLIC PANEL: CPT

## 2019-05-21 RX ORDER — ASPIRIN 325 MG
325 TABLET ORAL
Status: COMPLETED | OUTPATIENT
Start: 2019-05-21 | End: 2019-05-21

## 2019-05-21 RX ORDER — DIPHENHYDRAMINE HYDROCHLORIDE 50 MG/ML
12.5 INJECTION, SOLUTION INTRAMUSCULAR; INTRAVENOUS
Status: COMPLETED | OUTPATIENT
Start: 2019-05-21 | End: 2019-05-21

## 2019-05-21 RX ORDER — PROCHLORPERAZINE EDISYLATE 5 MG/ML
5 INJECTION INTRAMUSCULAR; INTRAVENOUS
Status: COMPLETED | OUTPATIENT
Start: 2019-05-21 | End: 2019-05-21

## 2019-05-21 RX ADMIN — PROCHLORPERAZINE EDISYLATE 5 MG: 5 INJECTION INTRAMUSCULAR; INTRAVENOUS at 17:56

## 2019-05-21 RX ADMIN — SODIUM CHLORIDE 1000 ML: 900 INJECTION, SOLUTION INTRAVENOUS at 17:56

## 2019-05-21 RX ADMIN — ASPIRIN 325 MG ORAL TABLET 325 MG: 325 PILL ORAL at 17:56

## 2019-05-21 RX ADMIN — DIPHENHYDRAMINE HYDROCHLORIDE 12.5 MG: 50 INJECTION INTRAMUSCULAR; INTRAVENOUS at 17:56

## 2019-05-21 NOTE — ED PROVIDER NOTES
EMERGENCY DEPARTMENT HISTORY AND PHYSICAL EXAM    5:16 PM      Date: 5/21/2019  Patient Name: Yaakov Ruvalcaba    History of Presenting Illness     Chief Complaint   Patient presents with    Chest Pain    Dizziness    Headache         History Provided By: Patient    Additional History (Context): Yaakov Ruvalcaba is a 54 y.o. female with hx of anemia, arthritis, HTN, chronic fatigue who presents with complaint of posterior headache for 2 days. Patient notes pain is sharp and associated with blurred vision, photophobia, and phonophobia. Notes history of migraines, but this feels different. Notes gradual onset of symptoms. Notes she has not tried any medication at home for symptoms. Also notes central chest pressure since 2:30 PM.  Notes the symptoms have been constant. Notes radiation into the neck. Denies pleuritic or exertional symptoms. Denies fever chills, dyspnea, cough, diaphoresis, nausea or vomiting. Denies history of cardiac disease, family history of cardiac disease, history of DVT or PE, hemoptysis, recent surgery or travel, leg swelling. Patient is a smoker. PCP: Scott Osorio MD    Current Outpatient Medications   Medication Sig Dispense Refill    DULoxetine (CYMBALTA) 30 mg capsule Take 1 Cap by mouth daily.  30 Cap 1       Past History     Past Medical History:  Past Medical History:   Diagnosis Date    Anemia     Arthritis     Fibroids     uterine fibroids       Past Surgical History:  Past Surgical History:   Procedure Laterality Date    HX HYSTERECTOMY      HX OTHER SURGICAL      surgery for punctured intestine    HX OVARIAN CYST REMOVAL Bilateral     HX TUBAL LIGATION         Family History:  Family History   Problem Relation Age of Onset    Hypertension Mother     Diabetes Mother     Breast Cancer Maternal Aunt        Social History:  Social History     Tobacco Use    Smoking status: Never Smoker    Smokeless tobacco: Never Used   Substance Use Topics    Alcohol use: No    Drug use: No       Allergies:  No Known Allergies      Review of Systems       Review of Systems   Constitutional: Negative for chills and fever. Eyes: Positive for photophobia and visual disturbance. Respiratory: Negative for shortness of breath. Cardiovascular: Positive for chest pain. Gastrointestinal: Negative for abdominal pain, nausea and vomiting. Skin: Negative for rash. Neurological: Positive for headaches. Negative for weakness. All other systems reviewed and are negative. Physical Exam     Visit Vitals  /68   Pulse 67   Temp 98.3 °F (36.8 °C)   Resp 17   Ht 5' 6\" (1.676 m)   Wt 81.6 kg (180 lb)   LMP 01/15/2016   SpO2 98%   BMI 29.05 kg/m²         Physical Exam   Constitutional: She is oriented to person, place, and time. She appears well-developed and well-nourished. No distress. HENT:   Head: Normocephalic and atraumatic. Mouth/Throat: Oropharynx is clear and moist. No oropharyngeal exudate. Eyes: Pupils are equal, round, and reactive to light. Neck: Normal range of motion. Neck supple. No nuchal rigidity    Cardiovascular: Normal rate, regular rhythm, normal heart sounds and intact distal pulses. Exam reveals no gallop and no friction rub. No murmur heard. Pulmonary/Chest: Effort normal and breath sounds normal. No stridor. No respiratory distress. She has no wheezes. She has no rales. She exhibits no tenderness. Abdominal: Soft. Bowel sounds are normal. She exhibits no distension and no mass. There is no tenderness. There is no rebound and no guarding. Musculoskeletal: Normal range of motion. Lymphadenopathy:     She has no cervical adenopathy. Neurological: She is alert and oriented to person, place, and time. She has normal strength. She is not disoriented. No cranial nerve deficit or sensory deficit. Coordination normal.   Skin: Skin is warm. No rash noted. She is not diaphoretic. Nursing note and vitals reviewed.         Diagnostic Study Results Labs -  No results found for this or any previous visit (from the past 12 hour(s)). Radiologic Studies -   CT HEAD WO CONT   Final Result   No CT evidence for acute intracranial process. Mild mucous debris within the left ethmoid sinus. XR CHEST PORT   Final Result   Impression:     No acute finding. Medical Decision Making   I am the first provider for this patient. I reviewed the vital signs, available nursing notes, past medical history, past surgical history, family history and social history. Vital Signs-Reviewed the patient's vital signs. Pulse Oximetry Analysis -  100 on room air     Cardiac Monitor:  Rate: 80  Rhythm: normal sinus rhythm     EKG: Interpreted by the EP. Time Interpreted: 1642   Rate:    Rhythm: normal sinus rhythm   Interpretation: T wave flattening lead III   Comparison: none    Records Reviewed: Nursing Notes and Old Medical Records (Time of Review: 5:16 PM)    ED Course: Progress Notes, Reevaluation, and Consults:  7:13 PM: Pt notes pain has reduced from 9/10 to 4/10. Pt resting comfortably, no distress. Reviewed results up to this point, discussed importance of close follow-up with PMD and cardiologist.     PROGRESS NOTE:  8:00 PM  Patient care will be transferred to Kyle Prieto PA-C  Discussed available diagnostic results and care plan at length. Pending repeat cardiac panel. If negative, stable for d/c. Written by Conchita Valdez PA-C    Provider Notes (Medical Decision Making): 82-year-old female who presents due to posterior headache for 2 days. Afebrile, vital signs stable, nontoxic-appearing. Alert and oriented x3, no neurologic deficit on examination. CT head negative for acute process. No nuchal rigidity or rash. Symptoms improved with IV fluids and Compazine. Also with chest pain since 2:30 PM.  No pleuritic or exertional symptoms, no diaphoresis, n/v.  EKG without ischemic changes, initial troponin negative. PERC/Wells negative. Do not suspect ACS, PE, or aortic pathology. Pending repeat cardiac enzymes due to duration of symptoms. Repeat troponin within normal limits will discharge home. Return precautions have been given. Preston Guzmán      Diagnosis     Clinical Impression:   1. Acute nonintractable headache, unspecified headache type    2. Chest pain, unspecified type        Disposition: TBD    Follow-up Information     Follow up With Specialties Details Why Contact Info    [facility] EMERGENCY DEPT Emergency Medicine  If symptoms worsen [address]    Irving Wells MD [facility] In 2 days  [address]  [phone]      Nena Atkinson MD Cardiology Schedule an appointment as soon as possible for a visit  [address]  [phone]             Discharge Medication List as of 5/21/2019  9:25 PM      CONTINUE these medications which have NOT CHANGED    Details   DULoxetine (CYMBALTA) 30 mg capsule Take 1 Cap by mouth daily. , Normal, Disp-30 Cap, R-1             Dictation disclaimer:  Please note that this dictation was completed with Ep[employer]iro, the [employer] voice recognition software. Quite often unanticipated grammatical, syntax, homophones, and other interpretive errors are inadvertently transcribed by the computer software. Please disregard these errors. Please excuse any errors that have escaped final proofreading.

## 2019-05-21 NOTE — DISCHARGE INSTRUCTIONS
Patient Education   Take medication as prescribed. Follow-up with your primary care physician in 2 days for reassessment. Bring the results from this visit with you for their review. Return to the ED immediately for any new, worsening, or persistent symptoms, including dizziness, changes in vision, vomiting, or any other medical concerns. Chest Pain: Care Instructions  Your Care Instructions    There are many things that can cause chest pain. Some are not serious and will get better on their own in a few days. But some kinds of chest pain need more testing and treatment. Your doctor may have recommended a follow-up visit in the next 8 to 12 hours. If you are not getting better, you may need more tests or treatment. Even though your doctor has released you, you still need to watch for any problems. The doctor carefully checked you, but sometimes problems can develop later. If you have new symptoms or if your symptoms do not get better, get medical care right away. If you have worse or different chest pain or pressure that lasts more than 5 minutes or you passed out (lost consciousness), call 911 or seek other emergency help right away. A medical visit is only one step in your treatment. Even if you feel better, you still need to do what your doctor recommends, such as going to all suggested follow-up appointments and taking medicines exactly as directed. This will help you recover and help prevent future problems. How can you care for yourself at home? · Rest until you feel better. · Take your medicine exactly as prescribed. Call your doctor if you think you are having a problem with your medicine. · Do not drive after taking a prescription pain medicine. When should you call for help? Call 911 if:    · You passed out (lost consciousness).     · You have severe difficulty breathing.     · You have symptoms of a heart attack. These may include:  ?  Chest pain or pressure, or a strange feeling in your chest.  ? Sweating. ? Shortness of breath. ? Nausea or vomiting. ? Pain, pressure, or a strange feeling in your back, neck, jaw, or upper belly or in one or both shoulders or arms. ? Lightheadedness or sudden weakness. ? A fast or irregular heartbeat. After you call 911, the  may tell you to chew 1 adult-strength or 2 to 4 low-dose aspirin. Wait for an ambulance. Do not try to drive yourself.    Call your doctor today if:    · You have any trouble breathing.     · Your chest pain gets worse.     · You are dizzy or lightheaded, or you feel like you may faint.     · You are not getting better as expected.     · You are having new or different chest pain. Where can you learn more? Go to http://kosta-michelle.info/. Enter A120 in the search box to learn more about \"Chest Pain: Care Instructions. \"  Current as of: September 23, 2018  Content Version: 11.9  © 4063-1323 [employer]. Care instructions adapted under license by [employer] (which disclaims liability or warranty for this information). If you have questions about a medical condition or this instruction, always ask your healthcare professional. [place] [address] any warranty or liability for your use of this information. Patient Education        Headache: Care Instructions  Your Care Instructions    Headaches have many possible causes. Most headaches aren't a sign of a more serious problem, and they will get better on their own. Home treatment may help you feel better faster. The doctor has checked you carefully, but problems can develop later. If you notice any problems or new symptoms, get medical treatment right away. Follow-up care is a key part of your treatment and safety. Be sure to make and go to all appointments, and call your doctor if you are having problems. It's also a good idea to know your test results and keep a list of the medicines you take.   How can you care for yourself at home? · Do not drive if you have taken a prescription pain medicine. · Rest in a quiet, dark room until your headache is gone. Close your eyes and try to relax or go to sleep. Don't watch TV or read. · Put a cold, moist cloth or cold pack on the painful area for 10 to 20 minutes at a time. Put a thin cloth between the cold pack and your skin. · Use a warm, moist towel or a heating pad set on low to relax tight shoulder and neck muscles. · Have someone gently massage your neck and shoulders. · Take pain medicines exactly as directed. ? If the doctor gave you a prescription medicine for pain, take it as prescribed. ? If you are not taking a prescription pain medicine, ask your doctor if you can take an over-the-counter medicine. · Be careful not to take pain medicine more often than the instructions allow, because you may get worse or more frequent headaches when the medicine wears off. · Do not ignore new symptoms that occur with a headache, such as a fever, weakness or numbness, vision changes, or confusion. These may be signs of a more serious problem. To prevent headaches  · Keep a headache diary so you can figure out what triggers your headaches. Avoiding triggers may help you prevent headaches. Record when each headache began, how long it lasted, and what the pain was like (throbbing, aching, stabbing, or dull). Write down any other symptoms you had with the headache, such as nausea, flashing lights or dark spots, or sensitivity to bright light or loud noise. Note if the headache occurred near your period. List anything that might have triggered the headache, such as certain foods (chocolate, cheese, wine) or odors, smoke, bright light, stress, or lack of sleep. · Find healthy ways to deal with stress. Headaches are most common during or right after stressful times.  Take time to relax before and after you do something that has caused a headache in the past.  · Try to keep your muscles relaxed by keeping good posture. Check your jaw, face, neck, and shoulder muscles for tension, and try relaxing them. When sitting at a desk, change positions often, and stretch for 30 seconds each hour. · Get plenty of sleep and exercise. · Eat regularly and well. Long periods without food can trigger a headache. · Treat yourself to a massage. Some people find that regular massages are very helpful in relieving tension. · Limit caffeine by not drinking too much coffee, tea, or soda. But don't quit caffeine suddenly, because that can also give you headaches. · Reduce eyestrain from computers by blinking frequently and looking away from the computer screen every so often. Make sure you have proper eyewear and that your monitor is set up properly, about an arm's length away. · Seek help if you have depression or anxiety. Your headaches may be linked to these conditions. Treatment can both prevent headaches and help with symptoms of anxiety or depression. When should you call for help? Call 911 anytime you think you may need emergency care. For example, call if:    · You have signs of a stroke. These may include:  ? Sudden numbness, paralysis, or weakness in your face, arm, or leg, especially on only one side of your body. ? Sudden vision changes. ? Sudden trouble speaking. ? Sudden confusion or trouble understanding simple statements. ? Sudden problems with walking or balance. ? A sudden, severe headache that is different from past headaches.    Call your doctor now or seek immediate medical care if:    · You have a new or worse headache.     · Your headache gets much worse. Where can you learn more? Go to http://kosta-michelle.info/. Enter M271 in the search box to learn more about \"Headache: Care Instructions. \"  Current as of: Lata 3, 2018  Content Version: 11.9  © 0106-6379 [employer].  Care instructions adapted under license by [employer] (which disclaims liability or warranty for this information). If you have questions about a medical condition or this instruction, always ask your healthcare professional. [place] [address] any warranty or liability for your use of this information.

## 2019-05-21 NOTE — ED NOTES
Assumed care of patient. No apparent distress noted. Patient currently sleeping. Vitals stable. Will continue to monitor.

## 2019-05-21 NOTE — ED TRIAGE NOTES
Patient c/o chest pain, dizziness, headache and sensitivity to light x 2 days. She states it feels like something is sitting on chest and it feels like the room is spinning.

## 2019-05-22 LAB
ATRIAL RATE: 82 BPM
CALCULATED P AXIS, ECG09: 66 DEGREES
CALCULATED R AXIS, ECG10: 28 DEGREES
CALCULATED T AXIS, ECG11: 26 DEGREES
DIAGNOSIS, 93000: NORMAL
P-R INTERVAL, ECG05: 146 MS
Q-T INTERVAL, ECG07: 374 MS
QRS DURATION, ECG06: 80 MS
QTC CALCULATION (BEZET), ECG08: 436 MS
VENTRICULAR RATE, ECG03: 82 BPM

## 2019-05-22 NOTE — ED NOTES
8:21 PM :Pt care assumed from [facility], ED provider. Pt complaint(s), current treatment plan, progression and available diagnostic results have been discussed thoroughly.   Rounding occurred: no  Intended Disposition: home   Pending diagnostic reports and/or labs (please list): Repeat Trop

## 2019-06-09 DIAGNOSIS — R53.82 CHRONIC FATIGUE: ICD-10-CM

## 2019-06-09 DIAGNOSIS — M25.60 STIFFNESS IN JOINT: ICD-10-CM

## 2019-06-09 DIAGNOSIS — M25.50 MULTIPLE JOINT PAIN: ICD-10-CM

## 2019-06-09 RX ORDER — NAPROXEN 500 MG/1
500 TABLET ORAL
Qty: 60 TAB | Refills: 1 | Status: SHIPPED | OUTPATIENT
Start: 2019-06-09 | End: 2019-08-08 | Stop reason: SDUPTHER

## 2019-06-24 DIAGNOSIS — R79.89 LOW TSH LEVEL: ICD-10-CM

## 2019-07-09 DIAGNOSIS — R53.82 CHRONIC FATIGUE: ICD-10-CM

## 2019-07-09 DIAGNOSIS — R52 PAIN OF MULTIPLE SITES: ICD-10-CM

## 2019-07-09 RX ORDER — DULOXETIN HYDROCHLORIDE 30 MG/1
CAPSULE, DELAYED RELEASE ORAL
Qty: 30 CAP | Refills: 0 | Status: SHIPPED | OUTPATIENT
Start: 2019-07-09 | End: 2019-08-13

## 2019-07-17 ENCOUNTER — OFFICE VISIT (OUTPATIENT)
Dept: ORTHOPEDIC SURGERY | Facility: CLINIC | Age: 49
End: 2019-07-17

## 2019-07-17 VITALS
SYSTOLIC BLOOD PRESSURE: 112 MMHG | HEART RATE: 67 BPM | RESPIRATION RATE: 16 BRPM | HEIGHT: 66 IN | TEMPERATURE: 99.1 F | WEIGHT: 191 LBS | DIASTOLIC BLOOD PRESSURE: 64 MMHG | BODY MASS INDEX: 30.7 KG/M2 | OXYGEN SATURATION: 95 %

## 2019-07-17 DIAGNOSIS — G56.02 CARPAL TUNNEL SYNDROME OF LEFT WRIST: ICD-10-CM

## 2019-07-17 DIAGNOSIS — G56.01 CARPAL TUNNEL SYNDROME OF RIGHT WRIST: Primary | ICD-10-CM

## 2019-07-17 NOTE — PROGRESS NOTES
Corinne Saa is a 54 y.o. female right handed individual, not currently working. Worker's Compensation and legal considerations: not known. Vitals:    07/17/19 1304   BP: 112/64   Pulse: 67   Resp: 16   Temp: 99.1 °F (37.3 °C)   TempSrc: Oral   SpO2: 95%   Weight: 191 lb (86.6 kg)   Height: 5' 6\" (1.676 m)   PainSc:   7   LMP: 01/15/2016           Chief Complaint   Patient presents with    Hand Pain     maureen hand pain, f/u appt. HPI: Patient comes in today for follow-up regarding her bilateral hand numbness and tingling. She also has an appointment coming up with a rheumatologist next week for possible evaluation for an inflammatory arthropathy. She had injections last time and bilateral carpal tunnels that she said helped for a couple weeks but not much beyond that. Initial HPI: Patient comes in today with complaints of bilateral hand numbness and tingling that was recently diagnosed as positive for carpal tunnel syndrome bilaterally. She reports that in January when she was lifting something while working at [employer] she noticed pain and numbness in both hands that has not relented. She denies any specific injuries.     Date of onset:  1/2019    Injury: No    Prior Treatment:  No    Numbness/ Tingling: Yes: Comment: Bilateral hands    ROS: Review of Systems - General ROS: negative  Respiratory ROS: no cough, shortness of breath, or wheezing  Cardiovascular ROS: no chest pain or dyspnea on exertion  Musculoskeletal ROS: positive for - pain in hand - bilateral  Neurological ROS: positive for - numbness/tingling  Dermatological ROS: negative    Past Medical History:   Diagnosis Date    Anemia     Arthritis     Fibroids     uterine fibroids       Past Surgical History:   Procedure Laterality Date    HX HYSTERECTOMY      HX OTHER SURGICAL      surgery for punctured intestine    HX OVARIAN CYST REMOVAL Bilateral     HX TUBAL LIGATION         Current Outpatient Medications   Medication Sig Dispense Refill    DULoxetine (CYMBALTA) 30 mg capsule TAKE 1 CAPSULE BY MOUTH EVERY DAY 30 Cap 0    naproxen (NAPROSYN) 500 mg tablet TAKE 1 TAB BY MOUTH TWO (2) TIMES DAILY AS NEEDED (PAIN). 60 Tab 1       No Known Allergies      PE:     NEUROVASCULAR    Examination L R Examination L R   Carpal Comp. + + Pronator Comp. - -   Carpal Tinel + + Pronator Tinel - -   Phalen's + + Pronator Stress - -   Cubital Comp. - - Guyon Comp. - -   Cubital Tinel - - Guyon Tinel - -   Elbow Hyperflexion - - Adson's - -   Spurling's - - SC Comp. - -   PCB Median abn - - SC Tinel - -   Radial Tinel - - IC Comp. - -   Digital Tinel - - IC Tinel - -   Radial 2-Pt WNL WNL Ulnar 2-Pt WNL WNL     Radial Pulse: 2+  Capillary Refill: < 2 sec  John: Not Performed  Digital John: Not Performed    Imaging: None Indicated today    NCS/EMG FINDINGS:     · Evaluation of the Left median motor and the Right median motor nerves showed decreased conduction velocity (Elbow-Wrist, L48.9, R46.0 m/s). · The Left ulnar motor, the Right ulnar motor, the Left ulnar sensory, and the Right ulnar sensory nerves were unremarkable. · The Left Median 2nd Digit sensory and the Right Median 2nd Digit sensory nerves showed prolonged distal peak latency (L5.0, R5.0 ms) and decreased conduction velocity (Wrist-2nd Digit, L34.2, R33.3 m/s).       INTERPRETATION:         Impression: This was an abnormal nerve conduction and EMG study showing there to be prolongation of the distal latencies in both median nerves. This is consistent with bilateral carpal tunnel syndrome. ICD-10-CM ICD-9-CM    1. Carpal tunnel syndrome of right wrist G56.01 354.0    2. Carpal tunnel syndrome of left wrist G56.02 354.0        Plan:     I offer the patient an injection today for bilateral carpal tunnel to see if that would help. She states she like to wait and see what the rheumatologist says and then come back.     Follow-up in 1 month for reevaluation and discussion regarding rheumatology evaluation.     Plan was reviewed with patient, who verbalized agreement and understanding of the plan

## 2019-08-02 ENCOUNTER — HOSPITAL ENCOUNTER (OUTPATIENT)
Dept: LAB | Age: 49
Discharge: HOME | End: 2019-08-02

## 2019-08-02 LAB — XX-LABCORP SPECIMEN COL,LCBCF: NORMAL

## 2019-08-02 PROCEDURE — 99001 SPECIMEN HANDLING PT-LAB: CPT

## 2019-08-03 LAB
SPECIMEN STATUS REPORT, ROLRST: NORMAL
TSH SERPL DL<=0.005 MIU/L-ACNC: 1.05 UIU/ML (ref 0.45–4.5)

## 2019-08-08 DIAGNOSIS — M25.50 MULTIPLE JOINT PAIN: ICD-10-CM

## 2019-08-08 DIAGNOSIS — R53.82 CHRONIC FATIGUE: ICD-10-CM

## 2019-08-08 DIAGNOSIS — M25.60 STIFFNESS IN JOINT: ICD-10-CM

## 2019-08-08 RX ORDER — NAPROXEN 500 MG/1
500 TABLET ORAL
Qty: 60 TAB | Refills: 1 | Status: SHIPPED | OUTPATIENT
Start: 2019-08-08 | End: 2019-10-12 | Stop reason: SDUPTHER

## 2019-08-13 ENCOUNTER — OFFICE VISIT (OUTPATIENT)
Dept: FAMILY MEDICINE CLINIC | Age: 49
End: 2019-08-13

## 2019-08-13 VITALS
BODY MASS INDEX: 31.02 KG/M2 | HEIGHT: 66 IN | OXYGEN SATURATION: 98 % | SYSTOLIC BLOOD PRESSURE: 120 MMHG | RESPIRATION RATE: 16 BRPM | HEART RATE: 70 BPM | DIASTOLIC BLOOD PRESSURE: 80 MMHG | TEMPERATURE: 98.3 F | WEIGHT: 193 LBS

## 2019-08-13 DIAGNOSIS — R53.82 CHRONIC FATIGUE: ICD-10-CM

## 2019-08-13 DIAGNOSIS — R52 PAIN OF MULTIPLE SITES: Primary | ICD-10-CM

## 2019-08-13 DIAGNOSIS — M79.7 FIBROMYALGIA: ICD-10-CM

## 2019-08-13 RX ORDER — DULOXETIN HYDROCHLORIDE 30 MG/1
30 CAPSULE, DELAYED RELEASE ORAL 2 TIMES DAILY
Qty: 60 CAP | Refills: 1 | Status: SHIPPED | OUTPATIENT
Start: 2019-08-13 | End: 2019-09-06

## 2019-08-13 NOTE — PROGRESS NOTES
HISTORY OF PRESENT ILLNESS  Andriy Resendez is a 54 y.o. female. HPI: here for follow up on chronic fatigue, ? Fibromyalgia and ? Carpal tunnel syndrome. She has seen rheumatologist. Review records and recommended to continue naproxen and cymbalta for now and ordered more labs. For now she said Cymbalta helping little. No side effects. Taking it with compliance. No joint swelling at this time. Review labs. ESR, RF and PRAKASH wnl  Visit Vitals  /80 (BP 1 Location: Left arm, BP Patient Position: Sitting)   Pulse 70   Temp 98.3 °F (36.8 °C) (Oral)   Resp 16   Ht 5' 6\" (1.676 m)   Wt 193 lb (87.5 kg)   SpO2 98%   BMI 31.15 kg/m²     Review medication list, vitals, problem list,allergies. Sitting on a chair. Not in an acute distress. Looks tiered. Denies any headache or dizziness, no chest pain or sob. No nausea or vomiting. No appetite or weight changes. Sleep has been disturbed. No mood changes. Had low tsh and repeat showed improvement. Not on any supplement. No change in bowel movement. No urinary or bowel complains. Lab Results   Component Value Date/Time    TSH 1.050 08/02/2019 12:00 AM     Lab Results   Component Value Date/Time    Hemoglobin A1c 5.3 12/07/2018 12:00 AM     Lab Results   Component Value Date/Time    Sodium 142 05/21/2019 04:44 PM    Potassium 3.6 05/21/2019 04:44 PM    Chloride 105 05/21/2019 04:44 PM    CO2 31 05/21/2019 04:44 PM    Anion gap 6 05/21/2019 04:44 PM    Glucose 77 05/21/2019 04:44 PM    BUN 12 05/21/2019 04:44 PM    Creatinine 1.02 05/21/2019 04:44 PM    BUN/Creatinine ratio 12 05/21/2019 04:44 PM    GFR est AA >60 05/21/2019 04:44 PM    GFR est non-AA 58 (L) 05/21/2019 04:44 PM    Calcium 8.8 05/21/2019 04:44 PM    Bilirubin, total 0.2 05/21/2019 04:44 PM    AST (SGOT) 17 05/21/2019 04:44 PM    Alk.  phosphatase 93 05/21/2019 04:44 PM    Protein, total 7.3 05/21/2019 04:44 PM    Albumin 3.6 05/21/2019 04:44 PM    Globulin 3.7 05/21/2019 04:44 PM    A-G Ratio 1.0 05/21/2019 04:44 PM    ALT (SGPT) 27 05/21/2019 04:44 PM     Lab Results   Component Value Date/Time    Cholesterol, total 176 12/07/2018 12:00 AM    HDL Cholesterol 59 12/07/2018 12:00 AM    LDL, calculated 97 12/07/2018 12:00 AM    VLDL, calculated 20 12/07/2018 12:00 AM    Triglyceride 99 12/07/2018 12:00 AM     Lab Results   Component Value Date/Time    WBC 8.1 05/21/2019 04:44 PM    HGB 11.3 (L) 05/21/2019 04:44 PM    HCT 35.3 05/21/2019 04:44 PM    PLATELET 814 58/85/8826 04:44 PM    MCV 87.6 05/21/2019 04:44 PM         Lab Results   Component Value Date/Time    Vitamin B12 506 04/10/2019 11:20 AM       ROS: see HPI     Physical Exam   Constitutional: She is oriented to person, place, and time. No distress. Neck: No thyromegaly present. Cardiovascular: Normal heart sounds. Pulmonary/Chest: No respiratory distress. She has no wheezes. Abdominal: Soft. There is no tenderness. Musculoskeletal: She exhibits no edema. Lymphadenopathy:     She has no cervical adenopathy. Neurological: She is oriented to person, place, and time. Psychiatric: Thought content normal.       ASSESSMENT and PLAN    ICD-10-CM ICD-9-CM    1. Pain of multiple sites: following rheumatologist. For now will go up on Cymbalta dose to twice daily and fu next visit. Discussed medication side effects again. If not checked by rheumatology will consider checking vitamin D . Hand out on fibromyalgia given to pt. R52 780.96 DULoxetine (CYMBALTA) 30 mg capsule   2. Chronic fatigue R53.82 780.79 DULoxetine (CYMBALTA) 30 mg capsule   3. Fibromyalgia M79.7 729.1    Pt understood and agree with the plan   Review hM  Flu shot will be available soon. Follow-up and Dispositions    · Return in about 2 months (around 10/13/2019).

## 2019-08-13 NOTE — PROGRESS NOTES
Chief Complaint   Patient presents with    Fatigue    Pain (Chronic)     Saw Rheum- Dr. Felicity Dumont- follows up 9/26- had multple tests done   Miguel Garg tomorrow    Thyroid Problem     1. Have you been to the ER, urgent care clinic since your last visit? Hospitalized since your last visit? No    2. Have you seen or consulted any other health care providers outside of the [address] [place] [address] since your last visit? Include any pap smears or colon screening.  Dr. Felicity Dumont testing done Follow-up 9/26

## 2019-08-13 NOTE — PATIENT INSTRUCTIONS
Fibromyalgia: Care Instructions  Your Care Instructions    Fibromyalgia is a painful condition that is not completely understood by medical experts. The cause of fibromyalgia is not known. It can make you feel tired and ache all over. It causes tender spots at specific points of the body that hurt only when you press on them. You may have trouble sleeping, as well as other symptoms. These problems can upset your work and home life. Symptoms tend to come and go, although they may never go away completely. Fibromyalgia does not harm your muscles, joints, or organs. Follow-up care is a key part of your treatment and safety. Be sure to make and go to all appointments, and call your doctor if you are having problems. It's also a good idea to know your test results and keep a list of the medicines you take. How can you care for yourself at home? · Exercise often. Walk, swim, or bike to help with pain and sleep problems and to make you feel better. · Try to get a good night's sleep. Go to bed and get up at the same time each day, whether you feel rested or not. Make sure you have a good mattress and pillow. · Reduce stress. Avoid things that cause you stress, if you can. If not, work at making them less stressful. Learn to use biofeedback, guided imagery, meditation, or other methods to relax. · Make healthy changes. Eat a balanced diet, quit smoking, and limit alcohol and caffeine. · Use a heating pad set on low or take warm baths or showers for pain. Using cold packs for up to 20 minutes at a time can also relieve pain. Put a thin cloth between the cold pack and your skin. A gentle massage might help too. · Be safe with medicines. Take your medicines exactly as prescribed. Call your doctor if you think you are having a problem with your medicine. Your doctor may talk to you about taking antidepressant medicines. These medicines may improve sleep, relieve pain, and in some cases treat depression.   · Learn about fibromyalgia. This makes coping easier. Then, take an active role in your treatment. · Think about joining a support group with others who have fibromyalgia to learn more and get support. When should you call for help? Watch closely for changes in your health, and be sure to contact your doctor if:    · You feel sad, helpless, or hopeless; lose interest in things you used to enjoy; or have other symptoms of depression.     · Your fibromyalgia symptoms get worse. Where can you learn more? Go to http://kosta-michelle.info/. Enter V003 in the search box to learn more about \"Fibromyalgia: Care Instructions. \"  Current as of: March 28, 2019  Content Version: 12.1  © 4066-5373 [employer], [employer]. Care instructions adapted under license by [employer] (which disclaims liability or warranty for this information). If you have questions about a medical condition or this instruction, always ask your healthcare professional. [address] any warranty or liability for your use of this information.

## 2019-08-14 ENCOUNTER — OFFICE VISIT (OUTPATIENT)
Dept: ORTHOPEDIC SURGERY | Facility: CLINIC | Age: 49
End: 2019-08-14

## 2019-08-14 VITALS
WEIGHT: 193.8 LBS | DIASTOLIC BLOOD PRESSURE: 75 MMHG | BODY MASS INDEX: 31.15 KG/M2 | TEMPERATURE: 97.7 F | HEART RATE: 70 BPM | HEIGHT: 66 IN | SYSTOLIC BLOOD PRESSURE: 128 MMHG | RESPIRATION RATE: 18 BRPM | OXYGEN SATURATION: 100 %

## 2019-08-14 DIAGNOSIS — G56.02 CARPAL TUNNEL SYNDROME OF LEFT WRIST: ICD-10-CM

## 2019-08-14 DIAGNOSIS — G56.01 CARPAL TUNNEL SYNDROME OF RIGHT WRIST: Primary | ICD-10-CM

## 2019-08-14 DIAGNOSIS — M79.7 FIBROMYALGIA: ICD-10-CM

## 2019-08-14 NOTE — PROGRESS NOTES
Franci Friday is a 54 y.o. female right handed individual, not currently working. Worker's Compensation and legal considerations: not known. Vitals:    08/14/19 0748   BP: 128/75   Pulse: 70   Resp: 18   Temp: 97.7 °F (36.5 °C)   TempSrc: Oral   SpO2: 100%   Weight: 193 lb 12.8 oz (87.9 kg)   Height: 5' 6\" (1.676 m)   PainSc:   6   PainLoc: Hand   LMP: 01/15/2016           Chief Complaint   Patient presents with    Hand Pain     Андрей       HPI: Patient comes in today for follow-up after seeing rheumatology. She was placed on Cymbalta yesterday for fibromyalgia. She reports some continued numbness and tingling in her fingers that she was diagnosed with bilateral carpal tunnel syndrome recently. She says it comes and goes. She would like to see how  the Cymbalta improves her symptoms for proceeding with an injection. Previous HPI: Patient comes in today for follow-up regarding her bilateral hand numbness and tingling. She also has an appointment coming up with a rheumatologist next week for possible evaluation for an inflammatory arthropathy. She had injections last time and bilateral carpal tunnels that she said helped for a couple weeks but not much beyond that. Initial HPI: Patient comes in today with complaints of bilateral hand numbness and tingling that was recently diagnosed as positive for carpal tunnel syndrome bilaterally. She reports that in January when she was lifting something while working at [employer] she noticed pain and numbness in both hands that has not relented. She denies any specific injuries.     Date of onset:  1/2019    Injury: No    Prior Treatment:  No    Numbness/ Tingling: Yes: Comment: Bilateral hands    ROS: Review of Systems - General ROS: negative  Respiratory ROS: no cough, shortness of breath, or wheezing  Cardiovascular ROS: no chest pain or dyspnea on exertion  Musculoskeletal ROS: positive for - pain in hand - bilateral  Neurological ROS: positive for - numbness/tingling  Dermatological ROS: negative    Past Medical History:   Diagnosis Date    Anemia     Arthritis     Fibroids     uterine fibroids       Past Surgical History:   Procedure Laterality Date    HX HYSTERECTOMY      HX OTHER SURGICAL      surgery for punctured intestine    HX OVARIAN CYST REMOVAL Bilateral     HX TUBAL LIGATION         Current Outpatient Medications   Medication Sig Dispense Refill    DULoxetine (CYMBALTA) 30 mg capsule Take 1 Cap by mouth two (2) times a day. 60 Cap 1    naproxen (NAPROSYN) 500 mg tablet TAKE 1 TAB BY MOUTH TWO (2) TIMES DAILY AS NEEDED (PAIN). 60 Tab 1       No Known Allergies      PE:     NEUROVASCULAR    Examination L R Examination L R   Carpal Comp. + + Pronator Comp. - -   Carpal Tinel + + Pronator Tinel - -   Phalen's + + Pronator Stress - -   Cubital Comp. - - Guyon Comp. - -   Cubital Tinel - - Guyon Tinel - -   Elbow Hyperflexion - - Adson's - -   Spurling's - - SC Comp. - -   PCB Median abn - - SC Tinel - -   Radial Tinel - - IC Comp. - -   Digital Tinel - - IC Tinel - -   Radial 2-Pt WNL WNL Ulnar 2-Pt WNL WNL     Radial Pulse: 2+  Capillary Refill: < 2 sec  John: Not Performed  Digital John: Not Performed    Imaging: None Indicated today    NCS/EMG FINDINGS:     · Evaluation of the Left median motor and the Right median motor nerves showed decreased conduction velocity (Elbow-Wrist, L48.9, R46.0 m/s). · The Left ulnar motor, the Right ulnar motor, the Left ulnar sensory, and the Right ulnar sensory nerves were unremarkable. · The Left Median 2nd Digit sensory and the Right Median 2nd Digit sensory nerves showed prolonged distal peak latency (L5.0, R5.0 ms) and decreased conduction velocity (Wrist-2nd Digit, L34.2, R33.3 m/s).       INTERPRETATION:         Impression: This was an abnormal nerve conduction and EMG study showing there to be prolongation of the distal latencies in both median nerves.   This is consistent with bilateral carpal tunnel syndrome. ICD-10-CM ICD-9-CM    1. Carpal tunnel syndrome of right wrist G56.01 354.0    2. Carpal tunnel syndrome of left wrist G56.02 354.0    3. Fibromyalgia M79.7 729.1        Plan:     I have told her that we would likely need to pursue some treatment in either operative or an injection for her bilateral carpal tunnels. She would like to wait at this time with follow-up as needed to discuss future treatment.     Plan was reviewed with patient, who verbalized agreement and understanding of the plan

## 2019-08-27 NOTE — PROGRESS NOTES
HISTORY OF PRESENT ILLNESS Nat Botello is a 54 y.o. female. HPI: here for follow up on multiple site joint pain and chronic fatigue. Chanell Snipe it is affecting her ADL. Makes her feel sad. No specific joint pain at this time but more stiffness and muscle aches on and off. She was sent to rheumatology and pending appt. Visit Vitals /60 (BP 1 Location: Right arm, BP Patient Position: Sitting) Pulse 66 Temp 98.3 °F (36.8 °C) (Oral) Resp 20 Ht 5' 4\" (1.626 m) Wt 186 lb (84.4 kg) SpO2 98% BMI 31.93 kg/m² Review medication list, vitals, problem list,allergies. Sitting on a chair. Said in pain generalize. Moderate in intensity. No specific joint pain. No known thyroid problem. Noted low tsh. No constipation or diarrhea. No heat or cold intolerance. No skin or hair changes. No change in voice. No trouble swallowing. Review labs. Lab Results Component Value Date/Time WBC 6.8 04/10/2019 11:20 AM  
 HGB 11.1 04/10/2019 11:20 AM  
 HCT 33.2 (L) 04/10/2019 11:20 AM  
 PLATELET 671 13/18/6848 11:20 AM  
 MCV 87 04/10/2019 11:20 AM  
 
Lab Results Component Value Date/Time Sodium 141 12/07/2018 12:00 AM  
 Potassium 4.2 12/07/2018 12:00 AM  
 Chloride 104 12/07/2018 12:00 AM  
 CO2 24 12/07/2018 12:00 AM  
 Anion gap 8 12/28/2017 07:06 AM  
 Glucose 93 12/07/2018 12:00 AM  
 BUN 10 12/07/2018 12:00 AM  
 Creatinine 0.79 12/07/2018 12:00 AM  
 BUN/Creatinine ratio 13 12/07/2018 12:00 AM  
 GFR est  12/07/2018 12:00 AM  
 GFR est non-AA 90 12/07/2018 12:00 AM  
 Calcium 9.7 12/07/2018 12:00 AM  
 Bilirubin, total 0.3 12/07/2018 12:00 AM  
 AST (SGOT) 23 12/07/2018 12:00 AM  
 Alk. phosphatase 95 12/07/2018 12:00 AM  
 Protein, total 7.3 12/07/2018 12:00 AM  
 Albumin 4.5 12/07/2018 12:00 AM  
 Globulin 3.6 12/28/2017 07:06 AM  
 A-G Ratio 1.6 12/07/2018 12:00 AM  
 ALT (SGPT) 16 12/07/2018 12:00 AM  
 
Lab Results Component Value Date/Time Routing refill request to provider for review/approval because:  Drug not on the [facility] refill protocol   Alida NICK RN             Cholesterol, total 176 12/07/2018 12:00 AM  
 HDL Cholesterol 59 12/07/2018 12:00 AM  
 LDL, calculated 97 12/07/2018 12:00 AM  
 VLDL, calculated 20 12/07/2018 12:00 AM  
 Triglyceride 99 12/07/2018 12:00 AM  
 
Lab Results Component Value Date/Time TSH 0.446 (L) 04/10/2019 11:20 AM  
 
Lab Results Component Value Date/Time Hemoglobin A1c 5.3 12/07/2018 12:00 AM  
 
Lab Results Component Value Date/Time Sed rate (ESR) 15 04/10/2019 11:20 AM  
 
RF wnl. PRAKASH wnl. ROS: no headache or dizziness. No chest pain or sob. No palpitation. No diaphoresis. Feels sad on and off due to chronic pain and interference in ADL. No nausea or vomiting. No abdominal pain. No urinary or bowel complains. No cold or cough. No fever. Physical Exam  
Constitutional: She is oriented to person, place, and time. No distress. Neck: No thyromegaly present. Cardiovascular: Normal heart sounds. Pulmonary/Chest: No respiratory distress. She has no wheezes. Abdominal: Soft. There is no tenderness. Musculoskeletal: She exhibits no edema. Neurological: She is oriented to person, place, and time. Psychiatric: Her behavior is normal.  
 
 
ASSESSMENT and PLAN 
  ICD-10-CM ICD-9-CM 1. Chronic fatigue: she has seen ortho. Also was on symptomatic treatment of gabapentin and Topamax at one point but currently not taking any medication. Discussed Cymbalta for feeling down, fatigue and pain. ? Fibromyalgia. Will start Cymbalta. Discussed medication side effects. F/u next visit. Mean time will get an appt for rheumatology for her. R53.82 780.79 DULoxetine (CYMBALTA) 30 mg capsule 2. Pain of multiple sites: see above  R52 780.96 DULoxetine (CYMBALTA) 30 mg capsule 3. Low TSH level: repeat lab in 6 wks. R79.89 794.5 TSH 3RD GENERATION 4. Bilateral carpal tunnel syndrome: seen hand specialist. For now had injection done but not much change in pain. For now observe.  Ice application. Tylenol or motrin with food as needed . F/u next visit. G56.03 354.0 Pt understood and agree with the plan Review hM

## 2019-09-04 ENCOUNTER — TELEPHONE (OUTPATIENT)
Dept: FAMILY MEDICINE CLINIC | Age: 49
End: 2019-09-04

## 2019-09-04 NOTE — TELEPHONE ENCOUNTER
[facility] is requesting a 90 day supply for RX DULoxetine (CYMBALTA) 30 mg capsule    Filled on 8/13/2019. Please advise.

## 2019-09-06 DIAGNOSIS — R52 PAIN OF MULTIPLE SITES: ICD-10-CM

## 2019-09-06 DIAGNOSIS — R53.82 CHRONIC FATIGUE: ICD-10-CM

## 2019-09-06 RX ORDER — DULOXETIN HYDROCHLORIDE 30 MG/1
30 CAPSULE, DELAYED RELEASE ORAL 2 TIMES DAILY
Qty: 180 CAP | Refills: 0 | Status: SHIPPED | OUTPATIENT
Start: 2019-09-06 | End: 2019-11-04 | Stop reason: SDUPTHER

## 2019-10-12 DIAGNOSIS — M25.60 STIFFNESS IN JOINT: ICD-10-CM

## 2019-10-12 DIAGNOSIS — R53.82 CHRONIC FATIGUE: ICD-10-CM

## 2019-10-12 DIAGNOSIS — M25.50 MULTIPLE JOINT PAIN: ICD-10-CM

## 2019-10-14 RX ORDER — NAPROXEN 500 MG/1
500 TABLET ORAL
Qty: 60 TAB | Refills: 1 | Status: SHIPPED | OUTPATIENT
Start: 2019-10-14 | End: 2020-03-16 | Stop reason: SDUPTHER

## 2019-10-16 ENCOUNTER — OFFICE VISIT (OUTPATIENT)
Dept: ORTHOPEDIC SURGERY | Facility: CLINIC | Age: 49
End: 2019-10-16

## 2019-10-16 VITALS
HEART RATE: 80 BPM | BODY MASS INDEX: 31.66 KG/M2 | SYSTOLIC BLOOD PRESSURE: 110 MMHG | WEIGHT: 197 LBS | HEIGHT: 66 IN | OXYGEN SATURATION: 97 % | TEMPERATURE: 97.3 F | RESPIRATION RATE: 16 BRPM | DIASTOLIC BLOOD PRESSURE: 70 MMHG

## 2019-10-16 DIAGNOSIS — G56.01 CARPAL TUNNEL SYNDROME OF RIGHT WRIST: Primary | ICD-10-CM

## 2019-10-16 DIAGNOSIS — G56.02 CARPAL TUNNEL SYNDROME OF LEFT WRIST: ICD-10-CM

## 2019-10-16 RX ORDER — MELOXICAM 15 MG/1
15 TABLET ORAL DAILY
COMMUNITY
End: 2020-01-14

## 2019-10-16 NOTE — PROGRESS NOTES
Kelli Monson is a 54 y.o. female right handed individual, not currently working. Worker's Compensation and legal considerations: not known. Vitals:    10/16/19 0807   BP: 110/70   Pulse: 80   Resp: 16   Temp: 97.3 °F (36.3 °C)   TempSrc: Oral   SpO2: 97%   Weight: 197 lb (89.4 kg)   Height: 5' 6\" (1.676 m)   PainSc:   6   LMP: 01/15/2016           Chief Complaint   Patient presents with    Hand Pain     maureen hand pain, f/u appt. HPI: Patient comes in today for follow-up regarding her bilateral carpal tunnel syndrome. She also has a history of fibromyalgia that she is on Cymbalta for. She previously has had bilateral carpal tunnel injections in the past but not at the last visit. We discussed surgery previously but she is not ready to have that due to personal and family issues. She would like bilateral carpal tunnel injections today. She has also been wearing her splint at night bilaterally. Previous HPI: Patient comes in today for follow-up after seeing rheumatology. She was placed on Cymbalta yesterday for fibromyalgia. She reports some continued numbness and tingling in her fingers that she was diagnosed with bilateral carpal tunnel syndrome recently. She says it comes and goes. She would like to see how  the Cymbalta improves her symptoms for proceeding with an injection. Previous HPI: Patient comes in today for follow-up regarding her bilateral hand numbness and tingling. She also has an appointment coming up with a rheumatologist next week for possible evaluation for an inflammatory arthropathy. She had injections last time and bilateral carpal tunnels that she said helped for a couple weeks but not much beyond that. Initial HPI: Patient comes in today with complaints of bilateral hand numbness and tingling that was recently diagnosed as positive for carpal tunnel syndrome bilaterally.   She reports that in January when she was lifting something while working at [employer] she noticed pain and numbness in both hands that has not relented. She denies any specific injuries. Date of onset:  1/2019    Injury: No    Prior Treatment:  No    Numbness/ Tingling: Yes: Comment: Bilateral hands    ROS: Review of Systems - General ROS: negative  Respiratory ROS: no cough, shortness of breath, or wheezing  Cardiovascular ROS: no chest pain or dyspnea on exertion  Musculoskeletal ROS: positive for - pain in hand - bilateral  Neurological ROS: positive for - numbness/tingling  Dermatological ROS: negative    Past Medical History:   Diagnosis Date    Anemia     Arthritis     Fibroids     uterine fibroids       Past Surgical History:   Procedure Laterality Date    HX HYSTERECTOMY      HX OTHER SURGICAL      surgery for punctured intestine    HX OVARIAN CYST REMOVAL Bilateral     HX TUBAL LIGATION         Current Outpatient Medications   Medication Sig Dispense Refill    meloxicam (MOBIC) 15 mg tablet Take 15 mg by mouth daily.  triamcinolone acetonide (KENALOG) 10 mg/mL injection 1 mL by Intra artICUlar route once for 1 dose. 1 Vial 0    naproxen (NAPROSYN) 500 mg tablet TAKE 1 TAB BY MOUTH TWO (2) TIMES DAILY AS NEEDED (PAIN). 60 Tab 1    DULoxetine (CYMBALTA) 30 mg capsule Take 1 Cap by mouth two (2) times a day. 180 Cap 0       No Known Allergies      PE:     NEUROVASCULAR    Examination L R Examination L R   Carpal Comp. + + Pronator Comp. - -   Carpal Tinel + + Pronator Tinel - -   Phalen's + + Pronator Stress - -   Cubital Comp. - - Guyon Comp. - -   Cubital Tinel - - Guyon Tinel - -   Elbow Hyperflexion - - Adson's - -   Spurling's - - SC Comp. - -   PCB Median abn - - SC Tinel - -   Radial Tinel - - IC Comp.  - -   Digital Tinel - - IC Tinel - -   Radial 2-Pt WNL WNL Ulnar 2-Pt WNL WNL     Radial Pulse: 2+  Capillary Refill: < 2 sec  John: Not Performed  [employer]: Not Performed    Imaging: None Indicated today    NCS/EMG FINDINGS:     · Evaluation of the Left median motor and the Right median motor nerves showed decreased conduction velocity (Elbow-Wrist, L48.9, R46.0 m/s). · The Left ulnar motor, the Right ulnar motor, the Left ulnar sensory, and the Right ulnar sensory nerves were unremarkable. · The Left Median 2nd Digit sensory and the Right Median 2nd Digit sensory nerves showed prolonged distal peak latency (L5.0, R5.0 ms) and decreased conduction velocity (Wrist-2nd Digit, L34.2, R33.3 m/s).       INTERPRETATION:         Impression: This was an abnormal nerve conduction and EMG study showing there to be prolongation of the distal latencies in both median nerves. This is consistent with bilateral carpal tunnel syndrome. ICD-10-CM ICD-9-CM    1. Carpal tunnel syndrome of right wrist G56.01 354.0 TRIAMCINOLONE ACETONIDE INJ      triamcinolone acetonide (KENALOG) 10 mg/mL injection      INJECT CARPAL TUNNEL   2.  Carpal tunnel syndrome of left wrist G56.02 354.0 TRIAMCINOLONE ACETONIDE INJ      triamcinolone acetonide (KENALOG) 10 mg/mL injection      INJECT CARPAL TUNNEL       Plan:     Bilateral carpal tunnel injections    Bilateral nighttime splints    Follow-up PRN    Plan was reviewed with patient, who verbalized agreement and understanding of the plan    [facility] [address] [place] [address] NOTE        Chart reviewed for the following:   IEsdras DO, have reviewed the History, Physical and updated the Allergic reactions for R Juanjose Nolasco 75 performed immediately prior to start of procedure:   Esdras WALTON DO, have performed the following reviews on Jonathan Parra prior to the start of the procedure:            * Patient was identified by name and date of birth   * Agreement on procedure being performed was verified  * Risks and Benefits explained to the patient  * Procedure site verified and marked as necessary  * Patient was positioned for comfort  * Consent was signed and verified     Time: 08:30 AM      Date of procedure: 10/16/2019    Procedure performed by: Waylon Myles DO    Provider assisted by: Lakshmi Schmidt LPN    Patient assisted by: self    How tolerated by patient: tolerated the procedure well with no complications    Post Procedural Pain Scale: 0 - No Hurt    Comments: none    Procedure:  After consent was obtained, using sterile technique the carpal tunnel was prepped. Local anesthetic used: 1% lidocaine. Kenalog 5 mg X2 and was then injected and the needle withdrawn. The procedure was well tolerated. The patient is asked to continue to rest the area for a few more days before resuming regular activities. It may be more painful for the first 1-2 days. Watch for fever, or increased swelling or persistent pain in the joint. Call or return to clinic prn if such symptoms occur or there is failure to improve as anticipated.

## 2019-10-16 NOTE — PROGRESS NOTES
1. Have you been to the ER, urgent care clinic since your last visit? Hospitalized since your last visit? NO    2. Have you seen or consulted any other health care providers outside of the [address] [place] [address] since your last visit? Include any pap smears or colon screening.    Yes, Dr. Maxwell Brilliant

## 2019-10-16 NOTE — PROGRESS NOTES
As per Dr. Colonel Jay instruction 1ml of kenalog 10 and 1ml of 1% lidocaine drawn into syringe for injection.

## 2019-11-04 ENCOUNTER — OFFICE VISIT (OUTPATIENT)
Dept: FAMILY MEDICINE CLINIC | Age: 49
End: 2019-11-04

## 2019-11-04 VITALS
BODY MASS INDEX: 31.18 KG/M2 | OXYGEN SATURATION: 98 % | HEIGHT: 66 IN | RESPIRATION RATE: 16 BRPM | SYSTOLIC BLOOD PRESSURE: 126 MMHG | HEART RATE: 75 BPM | TEMPERATURE: 98.3 F | WEIGHT: 194 LBS | DIASTOLIC BLOOD PRESSURE: 70 MMHG

## 2019-11-04 DIAGNOSIS — Z23 ENCOUNTER FOR IMMUNIZATION: ICD-10-CM

## 2019-11-04 DIAGNOSIS — G47.9 SLEEP TROUBLE: ICD-10-CM

## 2019-11-04 DIAGNOSIS — R53.82 CHRONIC FATIGUE: Primary | ICD-10-CM

## 2019-11-04 DIAGNOSIS — R52 PAIN OF MULTIPLE SITES: ICD-10-CM

## 2019-11-04 DIAGNOSIS — Z87.39 H/O FIBROMYALGIA: ICD-10-CM

## 2019-11-04 DIAGNOSIS — F32.89 OTHER DEPRESSION: ICD-10-CM

## 2019-11-04 DIAGNOSIS — R06.83 SNORING: ICD-10-CM

## 2019-11-04 RX ORDER — DULOXETIN HYDROCHLORIDE 60 MG/1
60 CAPSULE, DELAYED RELEASE ORAL 2 TIMES DAILY
Qty: 60 CAP | Refills: 1 | Status: SHIPPED | OUTPATIENT
Start: 2019-11-04 | End: 2019-11-29 | Stop reason: SDUPTHER

## 2019-11-04 NOTE — PROGRESS NOTES
1. Have you been to the ER, urgent care clinic since your last visit? Hospitalized since your last visit? No    2. Have you seen or consulted any other health care providers outside of the [address] [place] [address] since your last visit? Include any pap smears or colon screening.  Dr. Nisha Landry: 9/26/19     Chief Complaint   Patient presents with    Fatigue    Fibromyalgia    Other     multiple site pain    Referral Request     wants to find another rheumatologist - currently sees Dr. Elías Fang Referral Request     psychiatry

## 2019-11-04 NOTE — PATIENT INSTRUCTIONS
Vaccine Information Statement Influenza (Flu) Vaccine (Inactivated or Recombinant): What You Need to Know Many Vaccine Information Statements are available in Sinhala and other languages. See www.immunize.org/vis Hojas de información sobre vacunas están disponibles en español y en muchos otros idiomas. Visite www.immunize.org/vis 1. Why get vaccinated? Influenza vaccine can prevent influenza (flu). Flu is a contagious disease that spreads around the [place] every year, usually between October and May. Anyone can get the flu, but it is more dangerous for some people. Infants and young children, people 72years of age and older, pregnant women, and people with certain health conditions or a weakened immune system are at greatest risk of flu complications. Pneumonia, bronchitis, sinus infections and ear infections are examples of flu-related complications. If you have a medical condition, such as heart disease, cancer or diabetes, flu can make it worse. Flu can cause fever and chills, sore throat, muscle aches, fatigue, cough, headache, and runny or stuffy nose. Some people may have vomiting and diarrhea, though this is more common in children than adults. Each year thousands of people in the [facility] die from flu, and many more are hospitalized. Flu vaccine prevents millions of illnesses and flu-related visits to the doctor each year. 2. Influenza vaccines CDC recommends everyone 10months of age and older get vaccinated every flu season. Children 6 months through 6years of age may need 2 doses during a single flu season. Everyone else needs only 1 dose each flu season. It takes about 2 weeks for protection to develop after vaccination. There are many flu viruses, and they are always changing. Each year a new flu vaccine is made to protect against three or four viruses that are likely to cause disease in the upcoming flu season.  Even when the vaccine doesnt exactly match these viruses, it may still provide some protection. Influenza vaccine does not cause flu. Influenza vaccine may be given at the same time as other vaccines. 3. Talk with your health care provider Tell your vaccine provider if the person getting the vaccine: 
 Has had an allergic reaction after a previous dose of influenza vaccine, or has any severe, life-threatening allergies.  Has ever had Guillain-Barré Syndrome (also called GBS). In some cases, your health care provider may decide to postpone influenza vaccination to a future visit. People with minor illnesses, such as a cold, may be vaccinated. People who are moderately or severely ill should usually wait until they recover before getting influenza vaccine. Your health care provider can give you more information. 4. Risks of a reaction  Soreness, redness, and swelling where shot is given, fever, muscle aches, and headache can happen after influenza vaccine.  There may be a very small increased risk of Guillain-Barré Syndrome (GBS) after inactivated influenza vaccine (the flu shot). [place] children who get the flu shot along with pneumococcal vaccine (PCV13), and/or DTaP vaccine at the same time might be slightly more likely to have a seizure caused by fever. Tell your health care provider if a child who is getting flu vaccine has ever had a seizure. People sometimes faint after medical procedures, including vaccination. Tell your provider if you feel dizzy or have vision changes or ringing in the ears. As with any medicine, there is a very remote chance of a vaccine causing a severe allergic reaction, other serious injury, or death. 5. What if there is a serious problem? An allergic reaction could occur after the vaccinated person leaves the clinic.  If you see signs of a severe allergic reaction (hives, swelling of the face and throat, difficulty breathing, a fast heartbeat, dizziness, or weakness), call 9-1-1 and get the person to the nearest hospital. 
 
For other signs that concern you, call your health care provider. Adverse reactions should be reported to the Vaccine Adverse Event Reporting System (VAERS). Your health care provider will usually file this report, or you can do it yourself. Visit the VAERS website at www.vaers. hhs.gov or call 2-700.980.3651. VAERS is only for reporting reactions, and VAERS staff do not give medical advice. 6. The National Vaccine Injury Compensation Program 
 
The [facility] Vaccine Injury Compensation Program (VICP) is a federal program that was created to compensate people who may have been injured by certain vaccines. Visit the VICP website at www.hrsa.gov/vaccinecompensation or call 0-673.174.2109 to learn about the program and about filing a claim. There is a time limit to file a claim for compensation. 7. How can I learn more?  Ask your health care provider.  Call your local or state health department.  Contact the Centers for Disease Control and Prevention (CDC): 
- Call 4-113.325.3483 (0-226-SNN-INFO) or 
- Visit CDCs influenza website at www.cdc.gov/flu Vaccine Information Statement (Interim) Inactivated Influenza Vaccine 8/15/2019 
[address] [id number] [facility] and [employer] Centers for Disease Control and Prevention Office Use Only Learning About Sleeping Well What does sleeping well mean? Sleeping well means getting enough sleep. How much sleep is enough varies among people. The number of hours you sleep is not as important as how you feel when you wake up. If you do not feel refreshed, you probably need more sleep. Another sign of not getting enough sleep is feeling tired during the day. The average total nightly sleep time is 7½ to 8 hours. Healthy adults may need a little more or a little less than this. Why is getting enough sleep important? Getting enough quality sleep is a basic part of good health. When your sleep suffers, your mood and your thoughts can suffer too. You may find yourself feeling more grumpy or stressed. Not getting enough sleep also can lead to serious problems, including injury, accidents, anxiety, and depression. What might cause poor sleeping? Many things can cause sleep problems, including: · Stress. Stress can be caused by fear about a single event, such as giving a speech. Or you may have ongoing stress, such as worry about work or school. · Depression, anxiety, and other mental or emotional conditions. · Changes in your sleep habits or surroundings. This includes changes that happen where you sleep, such as noise, light, or sleeping in a different bed. It also includes changes in your sleep pattern, such as having jet lag or working a late shift. · Health problems, such as pain, breathing problems, and restless legs syndrome. · Lack of regular exercise. How can you help yourself? Here are some tips that may help you sleep more soundly and wake up feeling more refreshed. Your sleeping area · Use your bedroom only for sleeping and sex. A bit of light reading may help you fall asleep. But if it doesn't, do your reading elsewhere in the house. Don't watch TV in bed. · Be sure your bed is big enough to stretch out comfortably, especially if you have a sleep partner. · Keep your bedroom quiet, dark, and cool. Use curtains, blinds, or a sleep mask to block out light. To block out noise, use earplugs, soothing music, or a \"white noise\" machine. Your evening and bedtime routine · Create a relaxing bedtime routine. You might want to take a warm shower or bath, listen to soothing music, or drink a cup of noncaffeinated tea. · Go to bed at the same time every night. And get up at the same time every morning, even if you feel tired. What to avoid · Limit caffeine (coffee, tea, caffeinated sodas) during the day, and don't have any for at least 4 to 6 hours before bedtime. · Don't drink alcohol before bedtime. Alcohol can cause you to wake up more often during the night. · Don't smoke or use tobacco, especially in the evening. Nicotine can keep you awake. · Don't take naps during the day, especially close to bedtime. · Don't lie in bed awake for too long. If you can't fall asleep, or if you wake up in the middle of the night and can't get back to sleep within 15 minutes or so, get out of bed and go to another room until you feel sleepy. · Don't take medicine right before bed that may keep you awake or make you feel hyper or energized. Your doctor can tell you if your medicine may do this and if you can take it earlier in the day. If you can't sleep · Imagine yourself in a peaceful, pleasant scene. Focus on the details and feelings of being in a place that is relaxing. · Get up and do a quiet or boring activity until you feel sleepy. · Don't drink any liquids after 6 p.m. if you wake up often because you have to go to the bathroom. Where can you learn more? Go to http://kosta-michelle.info/. Enter Z329 in the search box to learn more about \"Learning About Sleeping Well. \" Current as of: May 28, 2019 Content Version: 12.2 © 0310-8061 [employer]. Care instructions adapted under license by [employer] (which disclaims liability or warranty for this information). If you have questions about a medical condition or this instruction, always ask your healthcare professional. [place] [address] any warranty or liability for your use of this information. Fibromyalgia: Care Instructions Your Care Instructions Fibromyalgia is a painful condition that is not completely understood by medical experts. The cause of fibromyalgia is not known. It can make you feel tired and ache all over.  It causes tender spots at specific points of the body that hurt only when you press on them. You may have trouble sleeping, as well as other symptoms. These problems can upset your work and home life. Symptoms tend to come and go, although they may never go away completely. Fibromyalgia does not harm your muscles, joints, or organs. Follow-up care is a key part of your treatment and safety. Be sure to make and go to all appointments, and call your doctor if you are having problems. It's also a good idea to know your test results and keep a list of the medicines you take. How can you care for yourself at home? · Exercise often. Walk, swim, or bike to help with pain and sleep problems and to make you feel better. · Try to get a good night's sleep. Go to bed and get up at the same time each day, whether you feel rested or not. Make sure you have a good mattress and pillow. · Reduce stress. Avoid things that cause you stress, if you can. If not, work at making them less stressful. Learn to use biofeedback, guided imagery, meditation, or other methods to relax. · Make healthy changes. Eat a balanced diet, quit smoking, and limit alcohol and caffeine. · Use a heating pad set on low or take warm baths or showers for pain. Using cold packs for up to 20 minutes at a time can also relieve pain. Put a thin cloth between the cold pack and your skin. A gentle massage might help too. · Be safe with medicines. Take your medicines exactly as prescribed. Call your doctor if you think you are having a problem with your medicine. Your doctor may talk to you about taking antidepressant medicines. These medicines may improve sleep, relieve pain, and in some cases treat depression. · Learn about fibromyalgia. This makes coping easier. Then, take an active role in your treatment. · Think about joining a support group with others who have fibromyalgia to learn more and get support. When should you call for help? Watch closely for changes in your health, and be sure to contact your doctor if: 
  · You feel sad, helpless, or hopeless; lose interest in things you used to enjoy; or have other symptoms of depression.  
  · Your fibromyalgia symptoms get worse. Where can you learn more? Go to http://kosta-michelle.info/. Enter V003 in the search box to learn more about \"Fibromyalgia: Care Instructions. \" Current as of: March 28, 2019 Content Version: 12.2 © 5704-1964 [employer]. Care instructions adapted under license by [employer] (which disclaims liability or warranty for this information). If you have questions about a medical condition or this instruction, always ask your healthcare professional. [address] any warranty or liability for your use of this information.

## 2019-11-04 NOTE — PROGRESS NOTES
HISTORY OF PRESENT ILLNESS  Kam Moya is a 54 y.o. female. HPI: here for follow up on her depression, chronic fatigue and fibromyalgia. she is following  Rheumatology. Said her pain and fatigue been improving gradually on cymbalta but her depression is not getting better. Denies any thoughts of hurting herself or others. Still struggle with sleep. Trouble falling and maintaining sleep. On and off joint pain and stiffness. Family history of RA. Had low tsh and repeat labs showed TSH wnl. Does snore at night time and poor sleep. Will send her for sleep evaluation. Also agree to see psychiatrist regarding her depression and possible counseling. Done referral.   Asking for a new referral to rheumatology for second opinion and further recommendations. Denies any headache, dizziness, no chest pain or trouble breathing, no arm or leg weakness. No nausea or vomiting, no weight or appetite changes. No urine or bowel complains, no palpitation, no diaphoresis. No abdominal pain. No cold or cough. No leg swelling. No fever. Visit Vitals  /70 (BP 1 Location: Left arm, BP Patient Position: Sitting)   Pulse 75   Temp 98.3 °F (36.8 °C) (Oral)   Resp 16   Ht 5' 6\" (1.676 m)   Wt 194 lb (88 kg)   SpO2 98%   BMI 31.31 kg/m²     Review medication list, vitals, problem list,allergies.    Lab Results   Component Value Date/Time    WBC 8.1 05/21/2019 04:44 PM    HGB 11.3 (L) 05/21/2019 04:44 PM    HCT 35.3 05/21/2019 04:44 PM    PLATELET 997 18/85/9470 04:44 PM    MCV 87.6 05/21/2019 04:44 PM     Lab Results   Component Value Date/Time    Sodium 142 05/21/2019 04:44 PM    Potassium 3.6 05/21/2019 04:44 PM    Chloride 105 05/21/2019 04:44 PM    CO2 31 05/21/2019 04:44 PM    Anion gap 6 05/21/2019 04:44 PM    Glucose 77 05/21/2019 04:44 PM    BUN 12 05/21/2019 04:44 PM    Creatinine 1.02 05/21/2019 04:44 PM    BUN/Creatinine ratio 12 05/21/2019 04:44 PM    GFR est AA >60 05/21/2019 04:44 PM    GFR est non-AA 58 (L) 05/21/2019 04:44 PM    Calcium 8.8 05/21/2019 04:44 PM    Bilirubin, total 0.2 05/21/2019 04:44 PM    AST (SGOT) 17 05/21/2019 04:44 PM    Alk. phosphatase 93 05/21/2019 04:44 PM    Protein, total 7.3 05/21/2019 04:44 PM    Albumin 3.6 05/21/2019 04:44 PM    Globulin 3.7 05/21/2019 04:44 PM    A-G Ratio 1.0 05/21/2019 04:44 PM    ALT (SGPT) 27 05/21/2019 04:44 PM     Lab Results   Component Value Date/Time    Cholesterol, total 176 12/07/2018 12:00 AM    HDL Cholesterol 59 12/07/2018 12:00 AM    LDL, calculated 97 12/07/2018 12:00 AM    VLDL, calculated 20 12/07/2018 12:00 AM    Triglyceride 99 12/07/2018 12:00 AM     Lab Results   Component Value Date/Time    TSH 1.050 08/02/2019 12:00 AM     Lab Results   Component Value Date/Time    Hemoglobin A1c 5.3 12/07/2018 12:00 AM     ROS: see HPI     Physical Exam   Constitutional: She is oriented to person, place, and time. No distress. Cardiovascular: Normal rate, regular rhythm and normal heart sounds. Pulmonary/Chest:   CTA   Abdominal: Soft. Bowel sounds are normal. There is no tenderness. Musculoskeletal: She exhibits no edema. Neurological: She is oriented to person, place, and time. Psychiatric: Her behavior is normal. Thought content normal.       ASSESSMENT and PLAN    ICD-10-CM ICD-9-CM    1. Chronic fatigue: fibromyalgia. For now will go up on cymbalta dose as her depression is not much improved. Discussed medication side effects again. F/u next visit. Mean time sending for sleep study . R53.82 780.79 DULoxetine (CYMBALTA) 60 mg capsule      REFERRAL TO RHEUMATOLOGY      REFERRAL TO SLEEP STUDIES   2. Pain of multiple sites: done referral to a new rheumatology. See HPI  R52 780.96 DULoxetine (CYMBALTA) 60 mg capsule      REFERRAL TO RHEUMATOLOGY   3. Encounter for immunization Z23 V03.89 INFLUENZA VIRUS VAC QUAD,SPLIT,PRESV FREE SYRINGE IM   4.  Other depression: see above  F32.89 311 DULoxetine (CYMBALTA) 60 mg capsule      REFERRAL TO PSYCHIATRY 5. Sleep trouble G47.9 780.50 REFERRAL TO SLEEP STUDIES      REFERRAL TO PSYCHIATRY   6. BMI 31.0-31.9,adult: discussed high BMI. Discussed diet modification, calorie count and exercise. Discussed importance of weight loss. agree to do exercise and life style modification. Diet and exercise hand out given in AVS.    Z68.31 V85.31    7. H/O fibromyalgia Z87.39 V13.59 REFERRAL TO RHEUMATOLOGY   8. Snoring: sending for sleep apnea evaluation. Has fatigue, fibromyalgia and depression. Poor sleep. R06.83 786.09 REFERRAL TO SLEEP STUDIES   Pt understood and agree with the plan   Review hM  Flu shot given today. Up to date with mammogram.   Follow-up and Dispositions    · Return in about 6 weeks (around 12/16/2019).

## 2019-11-04 NOTE — PROGRESS NOTES
Patient presents for the following vaccines: Influenza Vaccine. Patient consent obtained by patient. Patient tolerated procedure well at right deltoid. Patient remained in exam room, during vaccine documentation, for period of 10 minutes post injection to ensure no reaction. VIS was given to patient.     Lot # [id number]  Exp: 2020  MF [address]: [id number]

## 2019-11-15 ENCOUNTER — DOCUMENTATION ONLY (OUTPATIENT)
Dept: NEUROLOGY | Age: 49
End: 2019-11-15

## 2019-11-15 NOTE — PROGRESS NOTES
Chart Review completed for upcoming appointment with [facility]. New patient being seen for: Sleep trouble, chronic fatigue, snoring.  Referred by Dr. Court Lua

## 2019-11-20 ENCOUNTER — OFFICE VISIT (OUTPATIENT)
Dept: NEUROLOGY | Age: 49
End: 2019-11-20

## 2019-11-20 VITALS
TEMPERATURE: 98.5 F | HEART RATE: 82 BPM | SYSTOLIC BLOOD PRESSURE: 96 MMHG | DIASTOLIC BLOOD PRESSURE: 64 MMHG | WEIGHT: 193 LBS | HEIGHT: 64 IN | BODY MASS INDEX: 32.95 KG/M2 | OXYGEN SATURATION: 98 % | RESPIRATION RATE: 18 BRPM

## 2019-11-20 DIAGNOSIS — G47.00 INSOMNIA, UNSPECIFIED TYPE: ICD-10-CM

## 2019-11-20 DIAGNOSIS — E66.01 MORBID OBESITY (HCC): ICD-10-CM

## 2019-11-20 DIAGNOSIS — F34.1 DYSTHYMIA: ICD-10-CM

## 2019-11-20 DIAGNOSIS — G47.8 UPPER AIRWAY RESISTANCE SYNDROME: Primary | ICD-10-CM

## 2019-11-20 NOTE — PROGRESS NOTES
HPI:  53 y/o female referred by Dr. Justina Morelos for sleep disturbances. She has been having problems staying asleep since October. She has blamed her fibromyalgia for it. Her daughter has told her she snores really bad. She has been told she stops snoring, seems like she stops breathing, and her daughter has to tap her. She usually goes to bed at around 8pm, and may get up at 2:30am, sits there for a while, watches the TV until around 4am and then is up at 6am to get her kids on the bus. She is sleepy during the day, but does not nap. She does not drink caffeine. She is not a smoker.        Social History     Socioeconomic History    Marital status: SINGLE     Spouse name: Not on file    Number of children: Not on file    Years of education: Not on file    Highest education level: Not on file   Occupational History    Not on file   Social Needs    Financial resource strain: Not on file    Food insecurity:     Worry: Not on file     Inability: Not on file    Transportation needs:     Medical: Not on file     Non-medical: Not on file   Tobacco Use    Smoking status: Never Smoker    Smokeless tobacco: Never Used   Substance and Sexual Activity    Alcohol use: No    Drug use: No    Sexual activity: Not Currently     Partners: Male   Lifestyle    Physical activity:     Days per week: Not on file     Minutes per session: Not on file    Stress: Not on file   Relationships    Social connections:     Talks on phone: Not on file     Gets together: Not on file     Attends Mandaen service: Not on file     Active member of club or organization: Not on file     Attends meetings of clubs or organizations: Not on file     Relationship status: Not on file    Intimate partner violence:     Fear of current or ex partner: Not on file     Emotionally abused: Not on file     Physically abused: Not on file     Forced sexual activity: Not on file   Other Topics Concern    Not on file   Social History Narrative    Not on file Family History   Problem Relation Age of Onset    Hypertension Mother     Diabetes Mother     Breast Cancer Maternal Aunt        Current Outpatient Medications   Medication Sig Dispense Refill    DULoxetine (CYMBALTA) 60 mg capsule Take 1 Cap by mouth two (2) times a day. 60 Cap 1    meloxicam (MOBIC) 15 mg tablet Take 15 mg by mouth daily.  naproxen (NAPROSYN) 500 mg tablet TAKE 1 TAB BY MOUTH TWO (2) TIMES DAILY AS NEEDED (PAIN). 61 Tab 1       Past Medical History:   Diagnosis Date    Anemia     Arthritis     Fibroids     uterine fibroids       Past Surgical History:   Procedure Laterality Date    HX HYSTERECTOMY      HX OTHER SURGICAL      surgery for punctured intestine    HX OVARIAN CYST REMOVAL Bilateral     HX TUBAL LIGATION         No Known Allergies    There is no problem list on file for this patient. Review of Systems:   Constitutional: no fever or chills, ++ fatigue  Skin denies rash or itching  HEENT:  Denies tinnitus, hearing loss, or visual changes  Respiratory: denies shortness of breath  Cardiovascular: denies chest pain, dyspnea on exertion  Gastrointestinal: does not report nausea or vomiting  Genitourinary: does not report dysuria or incontinence  Musculoskeletal: reports joint and muscle pains  Endocrine: denies weight change  Hematology: denies easy bruising or bleeding   Neurological: as above in HPI      PHYSICAL EXAMINATION:         Vital signs:    Visit Vitals  BP 96/64   Pulse 82   Temp 98.5 °F (36.9 °C) (Oral)   Resp 18   Ht 5' 4\" (1.626 m)   Wt 87.5 kg (193 lb)   LMP 01/15/2016   SpO2 98%   BMI 33.13 kg/m²         GENERAL:                  Well developed, obese, in no apparent distress. HEART:                       RR, no murmurs  EXTREMITIES:           No edema is identified. Pulses are +2. HEAD:                         Normocephalic, atraumatic. Mallampati II, redundant soft palate, large uvula.       NEUROLOGIC EXAMINATION       MENTAL STATUS: Awake, alert, and oriented x 4. Attention and STM are grossly normal. There is no aphasia. Fund of knowledge is adequate. Mood and affect are appropriate  CRANIAL NERVES:   Visual fields are full to confrontation. Pupils are reactive to light and accommodation. Fundi are normal.   Extraocular movements are intact and there is no nystagmus. Facial sensation is normal  Face is symmetrical.   Hearing is present. SCM/TPZ 5/5  Tongue protrudes midline, palate elevates symmetrically. MOTOR:          5/5 strength throughout, normal tone. CEREBELLAR:           No tremors or dysmetria     SENSORY:     Normal distal pinprick, proprioception, and vibration. Romberg is neg. DTR's:                         +2 throughout, no long tract signs                 GAIT:                           Normal gait    Impression: Per history she has obstructive sleep apnea associated with obesity and insomnia, the latter which is d/t poor sleep hygiene and some degree of dysthymia. Plan: 1-Home sleep study. 2-Counseled pt at length about obstructive sleep apnea evaluation, treatment options, weight loss as appropriate, and consequences of untreated BUD. 3-Counseled pt about sleep hygiene, including predictable bedtimes and rise times, avoidance of late meals near bedtime, no TV in bedroom, to get out of room if unable to fall asleep after 10-15 mins, and no napping. 4-F/U after HST      PLEASE NOTE:   Portions of this document may have been produced using voice recognition software. Unrecognized errors in transcription may be present. This note will not be viewable in [address]

## 2019-11-20 NOTE — PROGRESS NOTES
Chief Complaint   Patient presents with    Sleep Apnea     PCP referred     Visit Vitals  BP 96/64   Pulse 82   Temp 98.5 °F (36.9 °C) (Oral)   Resp 18   Ht 5' 4\" (1.626 m)   Wt 87.5 kg (193 lb)   SpO2 98%   BMI 33.13 kg/m²     1. Have you been to the ER, urgent care clinic since your last visit? Hospitalized since your last visit? No    2. Have you seen or consulted any other health care providers outside of the [address] [place] [address] since your last visit? Include any pap smears or colon screening.  No

## 2019-11-20 NOTE — LETTER
11/20/19 Patient: Desi Weaver YOB: 1970 Date of Visit: 11/20/2019 Luis Alfredo Thomas MD 
[place] [address] VIA In Basket Dear Luis Alfredo Thomas MD, Thank you for referring Ms. Desi Weaver to [facility] for evaluation. My notes for this consultation are attached. If you have questions, please do not hesitate to call me. I look forward to following your patient along with you.  
 
 
Sincerely, 
 
Makayla Bullard MD

## 2019-11-29 DIAGNOSIS — F32.89 OTHER DEPRESSION: ICD-10-CM

## 2019-11-29 DIAGNOSIS — R52 PAIN OF MULTIPLE SITES: ICD-10-CM

## 2019-11-29 DIAGNOSIS — R53.82 CHRONIC FATIGUE: ICD-10-CM

## 2019-11-29 NOTE — TELEPHONE ENCOUNTER
This pharmacy faxed over request for the following prescriptions to be filled: REQUESTING 90 DAY SUPPLY    Medication requested :   Requested Prescriptions     Pending Prescriptions Disp Refills    DULoxetine (CYMBALTA) 60 mg capsule 60 Cap 1     Sig: Take 1 Cap by mouth two (2) times a day.      PCP: [address] or Print: [facility]  Mail order or Local [email or web address] [facility]     Scheduled appointment if not seen by current providers in office: LOV 11/20/2019 F/U 12/16/2019 REQUESTING 90 DAY SUPPLY

## 2019-12-02 RX ORDER — DULOXETIN HYDROCHLORIDE 60 MG/1
60 CAPSULE, DELAYED RELEASE ORAL 2 TIMES DAILY
Qty: 60 CAP | Refills: 1 | Status: SHIPPED | OUTPATIENT
Start: 2019-12-02 | End: 2020-01-24 | Stop reason: SDUPTHER

## 2019-12-03 ENCOUNTER — TELEPHONE (OUTPATIENT)
Dept: FAMILY MEDICINE CLINIC | Age: 49
End: 2019-12-03

## 2019-12-03 NOTE — TELEPHONE ENCOUNTER
Need an appointment for Mammogram I have an appointment with Dr. Jasmin Abernathy on 12/16/2019 at 11:30 am can I have the appointment for that on that day? This cam through the my chart appt request. Please advise.

## 2019-12-12 ENCOUNTER — HOSPITAL ENCOUNTER (OUTPATIENT)
Dept: MAMMOGRAPHY | Age: 49
Discharge: HOME | End: 2019-12-12
Attending: FAMILY MEDICINE
Payer: MEDICAID

## 2019-12-12 DIAGNOSIS — Z12.31 VISIT FOR SCREENING MAMMOGRAM: ICD-10-CM

## 2019-12-12 PROCEDURE — 77067 SCR MAMMO BI INCL CAD: CPT

## 2019-12-16 ENCOUNTER — OFFICE VISIT (OUTPATIENT)
Dept: FAMILY MEDICINE CLINIC | Age: 49
End: 2019-12-16

## 2019-12-16 VITALS
BODY MASS INDEX: 33.43 KG/M2 | WEIGHT: 195.8 LBS | SYSTOLIC BLOOD PRESSURE: 114 MMHG | HEIGHT: 64 IN | OXYGEN SATURATION: 97 % | RESPIRATION RATE: 16 BRPM | TEMPERATURE: 98.7 F | DIASTOLIC BLOOD PRESSURE: 64 MMHG | HEART RATE: 74 BPM

## 2019-12-16 DIAGNOSIS — D53.9 ANEMIA, DEFICIENCY: ICD-10-CM

## 2019-12-16 DIAGNOSIS — R79.89 LOW TSH LEVEL: ICD-10-CM

## 2019-12-16 DIAGNOSIS — E04.9 ENLARGED THYROID GLAND: ICD-10-CM

## 2019-12-16 DIAGNOSIS — R53.82 CHRONIC FATIGUE: ICD-10-CM

## 2019-12-16 DIAGNOSIS — M79.7 FIBROMYALGIA: ICD-10-CM

## 2019-12-16 DIAGNOSIS — G47.30 SLEEP APNEA, UNSPECIFIED TYPE: Primary | ICD-10-CM

## 2019-12-16 DIAGNOSIS — M25.50 MULTIPLE JOINT PAIN: ICD-10-CM

## 2019-12-16 DIAGNOSIS — F32.89 OTHER DEPRESSION: ICD-10-CM

## 2019-12-16 DIAGNOSIS — R92.8 ABNORMAL MAMMOGRAM: ICD-10-CM

## 2019-12-16 PROBLEM — F33.2 DEPRESSION, MAJOR, SEVERE RECURRENCE (HCC): Status: ACTIVE | Noted: 2019-12-16

## 2019-12-16 RX ORDER — CHLORHEXIDINE GLUCONATE 1.2 MG/ML
RINSE ORAL
Refills: 0 | COMMUNITY
Start: 2019-11-17 | End: 2020-01-14

## 2019-12-16 NOTE — PATIENT INSTRUCTIONS
Goiter: Care Instructions Your Care Instructions A goiter is an enlarged thyroid gland. It can cause swelling in your neck. Your thyroid is found in the front of your neck. It makes a hormone that controls how your body uses energy. Goiters are caused by different things. Some are caused by high or low levels of thyroid hormone. Others are caused by too little iodine in the diet, or a growth or disease in the thyroid. In the [place], most goiters are caused by long-term autoimmune thyroiditis. This is also called Hashimoto's thyroiditis. It happens when the body's immune system damages the thyroid. You may take thyroid hormone to reduce the size of your goiter. Or you may need surgery or radioactive iodine treatment. Some people don't need any treatment. They only need to watch for changes in the goiter. Follow-up care is a key part of your treatment and safety. Be sure to make and go to all appointments, and call your doctor if you are having problems. It's also a good idea to know your test results and keep a list of the medicines you take. How can you care for yourself at home? · Be safe with medicines. Take your medicines exactly as prescribed. Call your doctor if you think you are having a problem with your medicine. You will get more details on the specific medicines your doctor prescribes. When should you call for help? Call 911 anytime you think you may need emergency care. For example, call if: 
  · You have trouble breathing.  
 Watch closely for changes in your health, and be sure to contact your doctor if: 
  · Your eyes turn red and bulge.  
  · You have trouble swallowing.  
  · You feel very tired or weak.  
  · You lose weight but are eating the same or more than usual.  
Where can you learn more? Go to http://kosta-michelle.info/. Enter K047 in the search box to learn more about \"Goiter: Care Instructions. \" Current as of: November 6, 2018 Content Version: 12.2 © 9809-4890 [employer]. Care instructions adapted under license by [employer] (which disclaims liability or warranty for this information). If you have questions about a medical condition or this instruction, always ask your healthcare professional. [address] any warranty or liability for your use of this information. Fibromyalgia: Care Instructions Your Care Instructions Fibromyalgia is a painful condition that is not completely understood by medical experts. The cause of fibromyalgia is not known. It can make you feel tired and ache all over. It causes tender spots at specific points of the body that hurt only when you press on them. You may have trouble sleeping, as well as other symptoms. These problems can upset your work and home life. Symptoms tend to come and go, although they may never go away completely. Fibromyalgia does not harm your muscles, joints, or organs. Follow-up care is a key part of your treatment and safety. Be sure to make and go to all appointments, and call your doctor if you are having problems. It's also a good idea to know your test results and keep a list of the medicines you take. How can you care for yourself at home? · Exercise often. Walk, swim, or bike to help with pain and sleep problems and to make you feel better. · Try to get a good night's sleep. Go to bed and get up at the same time each day, whether you feel rested or not. Make sure you have a good mattress and pillow. · Reduce stress. Avoid things that cause you stress, if you can. If not, work at making them less stressful. Learn to use biofeedback, guided imagery, meditation, or other methods to relax. · Make healthy changes. Eat a balanced diet, quit smoking, and limit alcohol and caffeine. · Use a heating pad set on low or take warm baths or showers for pain. Using cold packs for up to 20 minutes at a time can also relieve pain. Put a thin cloth between the cold pack and your skin. A gentle massage might help too. · Be safe with medicines. Take your medicines exactly as prescribed. Call your doctor if you think you are having a problem with your medicine. Your doctor may talk to you about taking antidepressant medicines. These medicines may improve sleep, relieve pain, and in some cases treat depression. · Learn about fibromyalgia. This makes coping easier. Then, take an active role in your treatment. · Think about joining a support group with others who have fibromyalgia to learn more and get support. When should you call for help? Watch closely for changes in your health, and be sure to contact your doctor if: 
  · You feel sad, helpless, or hopeless; lose interest in things you used to enjoy; or have other symptoms of depression.  
  · Your fibromyalgia symptoms get worse. Where can you learn more? Go to http://kosta-michelle.info/. Enter V003 in the search box to learn more about \"Fibromyalgia: Care Instructions. \" Current as of: March 28, 2019 Content Version: 12.2 © 3961-0564 [facility]. Care instructions adapted under license by [employer] (which disclaims liability or warranty for this information). If you have questions about a medical condition or this instruction, always ask your healthcare professional. [address] any warranty or liability for your use of this information. Learning about Antidepressants What are antidepressants? Antidepressants are medicines that change the levels of some chemicals in the brain. They can help affect moods. There are different types that work on brain chemicals in different ways. These medicines can help treat depression. They are also used for anxiety, eating disorders, post-traumatic stress disorder, and chronic pain. What are some examples? Here are some examples of antidepressants. For each item in the list, the generic name is first, followed by any brand names. · amitriptyline · bupropion (Wellbutrin) · citalopram (Celexa) · fluoxetine (Prozac) · sertraline (Zoloft) · venlafaxine (Effexor) This is not a complete list of antidepressants. What are the side effects? Side effects may vary. They depend on the medicine you take. But common ones include: 
· Nausea. · Dry mouth. · Loss of appetite. · Diarrhea or constipation. · Sexual problems. (These may include loss of desire or erection problems.) · Headaches. · Trouble falling asleep. Or you may wake up a lot at night. · Weight gain. · Feeling nervous or on edge. · Feeling drowsy in the daytime. Problems with sexual arousal and a lack of interest in sex are common side effects. If this happens to you, talk to your doctor. There are other medicines that may help with these problems. Mild side effects may go away after you take the medicine for a few weeks. If the side effects bother you, talk with your doctor. He or she may prescribe a different medicine. Or the doctor may suggest ways to manage your side effects. How do you take antidepressants safely? If your doctor has prescribed antidepressants, here are some tips to help you get the most from your medicine. · Share your health history with your doctor. Tell your doctor about your other medicines and health conditions. This information can affect which antidepressant your doctor prescribes for you. Tell your doctor if you or anyone in your family has had bipolar disorder or used antidepressants before. · Take your medicine as prescribed. It works best and has fewer side effects when you take it exactly as your doctor prescribed. If you miss a dose, and if it's not too late in the day, it's okay to take it. Don't double up doses. · Keep taking your medicine for a while. Taking it for at least 6 months after you feel better can help keep you from getting symptoms again. · Be prepared to try different medicines and doses. You may start to feel better within 1 to 3 weeks after you start the medicine. If you have not improved at all in 3 weeks, your doctor may increase your dose. Or you may need to try a different medicine. Over time, your doctor may need to adjust your dose again to manage your symptoms better. · Don't take any new medicines unless you talk to your doctor first.  
Even common medicines like aspirin and some vitamins and herbs can cause problems if you use them while you take antidepressants. · Do not drink alcohol. It can make the side effects worse. · Don't stop taking your medicine on your own. Quitting antidepressants too quickly can cause withdrawal symptoms. It can also cause depression to return. If you are having a problem with your medicine or are ready to quit taking it, work with your doctor. He or she can help you to slowly reduce the dose over a span of a few weeks. · Call your doctor right away for serious side effects. Examples include: 
? Warning signs of suicide. These include talking or writing about death, giving away belongings, or withdrawing from family and friends. ? Manic behavior. This includes having very high energy, sleeping less than normal, being impulsive, or being grouchy or restless. How might you feel about taking antidepressants? You may feel nervous, relieved, or a little surprised that your doctor is talking to you about antidepressants. And the thought of needing to take medicine for a long time can be scary. But whether you are depressed or you have another condition, you are taking a step to help yourself feel better. Follow-up care is a key part of your treatment and safety.  Be sure to make and go to all appointments, and call your doctor if you are having problems. It's also a good idea to know your test results and keep a list of the medicines you take. Where can you learn more? Go to http://kosta-michelle.info/. Enter A230 in the search box to learn more about \"Learning about Antidepressants. \" Current as of: May 28, 2019 Content Version: 12.2 © 4485-6737 [employer]. Care instructions adapted under license by [employer] (which disclaims liability or warranty for this information). If you have questions about a medical condition or this instruction, always ask your healthcare professional. [address] any warranty or liability for your use of this information.

## 2019-12-16 NOTE — PROGRESS NOTES
1. Have you been to the ER, urgent care clinic since your last visit? Hospitalized since your last visit? [facility] ED 11/17/19    2. Have you seen or consulted any other health care providers outside of the [address] [place] [address] since your last visit? Include any pap smears or colon screening. Former[facility] LOV: 11/25/19. Chief Complaint   Patient presents with    Fatigue    Pain (Chronic)    Fibromyalgia    Depression    Sleep Problem    Other     had stress test in 2002 and was told heart leakage and to follow-up in a year - patient wants to discuss referral   2011 [facility]     wants to know if she needs to get colonoscopy prior to 50 - maternal uncle had colon cancer       Patient has an appointment with Dr. Molly Tavarez 1/19/120 at 1:00PM. Also, patient has an appointment at [address] [place] [address] 1/18/20 at 8:30A. Med Eval is on 1/15/19 at [facility] [facility].

## 2019-12-17 NOTE — PROGRESS NOTES
HISTORY OF PRESENT ILLNESS  Radha Stratton is a 54 y.o. female. HPI: here for follow up. H/o fibromyalgia and multiple site pain. On symptomatic treatment. Now coming up soon appt with new rheumatology. Fatigue on and off. Denies any headache, dizziness, no chest pain or trouble breathing, no arm or leg weakness. No nausea or vomiting, no weight or appetite changes . No urine or bowel complains, no palpitation, no diaphoresis. No abdominal pain. No cold or cough. No leg swelling. No fever. H/o anemia. Not on any vitamin supplement. Post hysterectomy. Discuss to take multivitamin. Also  Noted enlarge thyroid gland on exam. Also prior h/o low tsh. Recent thyroid function wnl. Has chronic fatigue and arthritic pain over multiple site. No heat or cold intolerance. Denies any constipation. No urinary complains. No chest pain or sob. No nausea or vomiting . no abdominal pain. No appetite or weight changes. Taking Cymbalta with compliance and no side effects. Some improvement in fatigue and mood. Had abnormal mammogram. Had to do biopsy. Recently had repeat routine mammogram and was ok. Now recommended yearly mammogram.   No concern on self breast exam.   Discuss this results with pt. Visit Vitals  /64 (BP 1 Location: Left arm, BP Patient Position: Sitting)   Pulse 74   Temp 98.7 °F (37.1 °C) (Oral)   Resp 16   Ht 5' 4\" (1.626 m)   Wt 195 lb 12.8 oz (88.8 kg)   SpO2 97%   BMI 33.61 kg/m²     Review medication list, vitals, problem list,allergies.    Lab Results   Component Value Date/Time    WBC 8.1 05/21/2019 04:44 PM    HGB 11.3 (L) 05/21/2019 04:44 PM    HCT 35.3 05/21/2019 04:44 PM    PLATELET 708 11/80/6909 04:44 PM    MCV 87.6 05/21/2019 04:44 PM     Lab Results   Component Value Date/Time    Sodium 142 05/21/2019 04:44 PM    Potassium 3.6 05/21/2019 04:44 PM    Chloride 105 05/21/2019 04:44 PM    CO2 31 05/21/2019 04:44 PM    Anion gap 6 05/21/2019 04:44 PM    Glucose 77 05/21/2019 04:44 PM    BUN 12 05/21/2019 04:44 PM    Creatinine 1.02 05/21/2019 04:44 PM    BUN/Creatinine ratio 12 05/21/2019 04:44 PM    GFR est AA >60 05/21/2019 04:44 PM    GFR est non-AA 58 (L) 05/21/2019 04:44 PM    Calcium 8.8 05/21/2019 04:44 PM    Bilirubin, total 0.2 05/21/2019 04:44 PM    AST (SGOT) 17 05/21/2019 04:44 PM    Alk. phosphatase 93 05/21/2019 04:44 PM    Protein, total 7.3 05/21/2019 04:44 PM    Albumin 3.6 05/21/2019 04:44 PM    Globulin 3.7 05/21/2019 04:44 PM    A-G Ratio 1.0 05/21/2019 04:44 PM    ALT (SGPT) 27 05/21/2019 04:44 PM     Lab Results   Component Value Date/Time    Cholesterol, total 176 12/07/2018 12:00 AM    HDL Cholesterol 59 12/07/2018 12:00 AM    LDL, calculated 97 12/07/2018 12:00 AM    VLDL, calculated 20 12/07/2018 12:00 AM    Triglyceride 99 12/07/2018 12:00 AM     Lab Results   Component Value Date/Time    TSH 1.050 08/02/2019 12:00 AM     Lab Results   Component Value Date/Time    Hemoglobin A1c 5.3 12/07/2018 12:00 AM     Lab Results   Component Value Date/Time    Iron 60 04/10/2019 11:20 AM    TIBC 302 04/10/2019 11:20 AM    Iron % saturation 20 04/10/2019 11:20 AM     Negative PRAKASH, RF. ESR around 15. Seen by neurology to evaluate sleep apnea. Now going for sleep study . ROS : see HPI     Physical Exam  Constitutional:       General: She is not in acute distress. Neck:      Comments: Palpable enlarge thyroid gland. Cardiovascular:      Rate and Rhythm: Normal rate and regular rhythm. Heart sounds: Normal heart sounds. Abdominal:      General: Bowel sounds are normal.      Palpations: Abdomen is soft. Tenderness: There is no tenderness. Lymphadenopathy:      Cervical: No cervical adenopathy. Neurological:      Mental Status: She is oriented to person, place, and time. Psychiatric:         Behavior: Behavior normal.         ASSESSMENT and PLAN    ICD-10-CM ICD-9-CM    1. Sleep apnea, unspecified type: will follow specialist recommendations.   G47.30 780.57 seen neurology. now pending sleep study    2. Chronic fatigue: probably from arthritis and fibromyalgia. Also h/o low tsh. Enlarge thyroid gland. For now will follow rheumatology recommendations as done referral to get a new specialist.   Also mean time symptomatic treatment with  Tylenol or NSAID. R53.82 780.79    3. Fibromyalgia: on cymbalta. Helping some. M79.7 729.1    4. Low TSH level: recent tsh was wnl. For now enlarge thyroid gland . will do ultrasound. R79.89 794.5    5. Anemia, deficiency: for now observe with multivitamin. D53.9 281.9    6. Other depression: for now on Cymbalta and going to see psychiatrist. Joselin Dobbins counselor. Will follow their recommendations. F32.89 311     started following [facility] psych. Miss Greg Jones. now has pending appt on 18th jan with NP for medical management. 7. Multiple joint pain M25.50 719.49    8. Enlarged thyroid gland: ordered thyroid ultrasound. E04.9 240.9 US THYROID/PARATHYROID/SOFT TISS   9. Abnormal mammogram: recent routine mammogram was ok. Biopsy was benign. R92.8 793.80     had biopsy which was benign and repeat mammogram on dec 12th was negative. Pt understood and agree with the plan   Review hM   Follow-up and Dispositions    · Return in about 3 months (around 3/16/2020).

## 2019-12-19 ENCOUNTER — DOCUMENTATION ONLY (OUTPATIENT)
Dept: NEUROLOGY | Age: 49
End: 2019-12-19

## 2019-12-19 NOTE — PROGRESS NOTES
Patient called back and was told her insurance denied her sleep study. I told her she could call her insurance company and talk with them or pay out of pocket for her study. Patient said she could no afford to pay out of pocket. Patient called me back and ask if Geovanna Magalie will order the study in the hospital ? I told her I would ask and call her back.

## 2019-12-20 ENCOUNTER — DOCUMENTATION ONLY (OUTPATIENT)
Dept: NEUROLOGY | Age: 49
End: 2019-12-20

## 2019-12-20 ENCOUNTER — HOSPITAL ENCOUNTER (OUTPATIENT)
Dept: ULTRASOUND IMAGING | Age: 49
Discharge: HOME | End: 2019-12-20
Attending: FAMILY MEDICINE
Payer: MEDICAID

## 2019-12-20 DIAGNOSIS — E04.9 ENLARGED THYROID GLAND: ICD-10-CM

## 2019-12-20 PROCEDURE — 76536 US EXAM OF HEAD AND NECK: CPT

## 2019-12-23 ENCOUNTER — DOCUMENTATION ONLY (OUTPATIENT)
Dept: NEUROLOGY | Age: 49
End: 2019-12-23

## 2019-12-23 NOTE — PROGRESS NOTES
Ms. Rhonda Lubin has a Medicaid Policy [facility] will not accept her insurance. It was not denied.

## 2019-12-27 ENCOUNTER — TELEPHONE (OUTPATIENT)
Dept: FAMILY MEDICINE CLINIC | Age: 49
End: 2019-12-27

## 2019-12-27 NOTE — TELEPHONE ENCOUNTER
Pt states that she would like to results of her thyroid test and if she can get a RX for fluid pills because she has a lot of fluid on her knees. Please advise.  [phone]

## 2019-12-27 NOTE — TELEPHONE ENCOUNTER
Contacted patient and verified identity using name and date of birth (2- identifiers)  Spoke with patient and advised her results are awaiting review from Dr. Chip Luciano. I let her know I would advise once recommendations are received. Also scheduled appointment for Tuesday, January 14, 2020 03:00 PM for Lasix request. We have never Rx'd this and advised patient she needed to be seen.

## 2019-12-29 DIAGNOSIS — E04.2 MULTINODULAR GOITER: Primary | ICD-10-CM

## 2019-12-29 NOTE — TELEPHONE ENCOUNTER
Multinodular goiter on ultrasound. For now sending to endo. For fluid pills I need to see pt. Ask if she wants to get seen sooner. Please see if she can be seen today or tomorrow? ?

## 2019-12-30 ENCOUNTER — DOCUMENTATION ONLY (OUTPATIENT)
Dept: NEUROLOGY | Age: 49
End: 2019-12-30

## 2019-12-30 NOTE — PROGRESS NOTES
Sending to endo for further recommendation for findings on current ultrasound. Multinodular thyroid gland.

## 2019-12-30 NOTE — PROGRESS NOTES
Contacted patient and verified identity using name and date of birth (2- identifiers)  Spoke with patient and she verbalized understanding of need for further eval by Endocrinology for multinodular thyroid gland.

## 2020-01-06 NOTE — PROGRESS NOTES
Its an insurance issue then. Can Parisa or someone else figure out if she has insurance that would cover it? WIll they cover an attended study? What will they cover?

## 2020-01-14 ENCOUNTER — OFFICE VISIT (OUTPATIENT)
Dept: FAMILY MEDICINE CLINIC | Age: 50
End: 2020-01-14

## 2020-01-14 VITALS
HEART RATE: 72 BPM | HEIGHT: 64 IN | DIASTOLIC BLOOD PRESSURE: 62 MMHG | SYSTOLIC BLOOD PRESSURE: 120 MMHG | WEIGHT: 198.6 LBS | BODY MASS INDEX: 33.9 KG/M2 | OXYGEN SATURATION: 98 % | RESPIRATION RATE: 16 BRPM | TEMPERATURE: 98.4 F

## 2020-01-14 DIAGNOSIS — M25.461 BILATERAL KNEE SWELLING: Primary | ICD-10-CM

## 2020-01-14 DIAGNOSIS — M25.462 BILATERAL KNEE SWELLING: Primary | ICD-10-CM

## 2020-01-14 RX ORDER — MELOXICAM 7.5 MG/1
7.5 TABLET ORAL DAILY
COMMUNITY
End: 2020-07-07 | Stop reason: DRUGHIGH

## 2020-01-14 NOTE — PROGRESS NOTES
HISTORY OF PRESENT ILLNESS  Mariana Anthony is a 54 y.o. female. HPI: Here for bilateral knee discomfort and swelling on and off. Said she had couple of weeks ago generalize swelling but today better. Said going up on the stairs is making pain worse. No lower ext tingling, numbness or weakness. She is taking mobic daily and it is helping some. Working on weight loss. Said she is working on [employer]. No cough or cold. Her rt knee is worse than left. Today during exam no swelling and generalize discomfort over rt knee but no point tenderness over bilateral knee. No fall or direct injury over bilateral knee. No other joint pain or swelling. Visit Vitals  /62 (BP 1 Location: Right arm, BP Patient Position: Sitting)   Pulse 72   Temp 98.4 °F (36.9 °C) (Oral)   Resp 16   Ht 5' 4\" (1.626 m)   Wt 198 lb 9.6 oz (90.1 kg)   SpO2 98%   BMI 34.09 kg/m²     Review medication list, vitals, problem list,allergies. ROS: no headache or  Dizziness, no chest pain or sob. No palpitation. No cold or cough. Physical Exam  Musculoskeletal:         General: No swelling. Comments: Bilateral knee: no swelling. No redness. Rt knee: generalize discomfort but left knee no tenderness. ROM bilaterally wnl. No crepitus on flexion. Neurological:      Mental Status: She is alert and oriented to person, place, and time. ASSESSMENT and PLAN    ICD-10-CM ICD-9-CM    1. Bilateral knee swelling: and pain and discomfort. For now obtain x-ray. Symptomatic treatment and discussed weight loss. Continue mobic and alternate with tylenol arthritis. Ice application. If no improvement will consider PT or ortho recommendations. M25.461 719.06 XR KNEE RT MAX 2 VWS    M25.462  XR KNEE LT MAX 2 VWS   Pt understood and agree with the plan     Follow-up and Dispositions    · Return in about 1 month (around 2/14/2020).

## 2020-01-14 NOTE — PATIENT INSTRUCTIONS
Low Sodium Diet (2,000 Milligram): Care Instructions Your Care Instructions Too much sodium causes your body to hold on to extra water. This can raise your blood pressure and force your heart and kidneys to work harder. In very serious cases, this could cause you to be put in the hospital. It might even be life-threatening. By limiting sodium, you will feel better and lower your risk of serious problems. The most common source of sodium is salt. People get most of the salt in their diet from canned, prepared, and packaged foods. Fast food and restaurant meals also are very high in sodium. Your doctor will probably limit your sodium to less than 2,000 milligrams (mg) a day. This limit counts all the sodium in prepared and packaged foods and any salt you add to your food. Follow-up care is a key part of your treatment and safety. Be sure to make and go to all appointments, and call your doctor if you are having problems. It's also a good idea to know your test results and keep a list of the medicines you take. How can you care for yourself at home? Read food labels · Read labels on cans and food packages. The labels tell you how much sodium is in each serving. Make sure that you look at the serving size. If you eat more than the serving size, you have eaten more sodium. · Food labels also tell you the Percent Daily Value for sodium. Choose products with low Percent Daily Values for sodium. · Be aware that sodium can come in forms other than salt, including monosodium glutamate (MSG), sodium citrate, and sodium bicarbonate (baking soda). MSG is often added to Asian food. When you eat out, you can sometimes ask for food without MSG or added salt. Buy low-sodium foods · Buy foods that are labeled \"unsalted\" (no salt added), \"sodium-free\" (less than 5 mg of sodium per serving), or \"low-sodium\" (less than 140 mg of sodium per serving).  Foods labeled \"reduced-sodium\" and \"light sodium\" may still have too much sodium. Be sure to read the label to see how much sodium you are getting. · Buy fresh vegetables, or frozen vegetables without added sauces. Buy low-sodium versions of canned vegetables, soups, and other canned goods. Prepare low-sodium meals · Cut back on the amount of salt you use in cooking. This will help you adjust to the taste. Do not add salt after cooking. One teaspoon of salt has about 2,300 mg of sodium. · Take the salt shaker off the table. · Flavor your food with garlic, lemon juice, onion, vinegar, herbs, and spices. Do not use soy sauce, lite soy sauce, steak sauce, onion salt, garlic salt, celery salt, mustard, or ketchup on your food. · Use low-sodium salad dressings, sauces, and ketchup. Or make your own salad dressings and sauces without adding salt. · Use less salt (or none) when recipes call for it. You can often use half the salt a recipe calls for without losing flavor. Other foods such as rice, pasta, and grains do not need added salt. · Rinse canned vegetables, and cook them in fresh water. This removes somebut not allof the salt. · Avoid water that is naturally high in sodium or that has been treated with water softeners, which add sodium. Call your local water company to find out the sodium content of your water supply. If you buy bottled water, read the label and choose a sodium-free brand. Avoid high-sodium foods · Avoid eating: 
? Smoked, cured, salted, and canned meat, fish, and poultry. ? Ham, moreau, hot dogs, and luncheon meats. ? Regular, hard, and processed cheese and regular peanut butter. ? Crackers with salted tops, and other salted snack foods such as pretzels, chips, and salted popcorn. ? Frozen prepared meals, unless labeled low-sodium. ? Canned and dried soups, broths, and bouillon, unless labeled sodium-free or low-sodium. ? Canned vegetables, unless labeled sodium-free or low-sodium. ? Western Nancy fries, pizza, tacos, and other fast foods. ? Pickles, olives, ketchup, and other condiments, especially soy sauce, unless labeled sodium-free or low-sodium. Where can you learn more? Go to http://kosta-michelle.info/. Enter A494 in the search box to learn more about \"Low Sodium Diet (2,000 Milligram): Care Instructions. \" Current as of: November 7, 2018 Content Version: 12.2 © 9534-2756 [employer]. Care instructions adapted under license by "[employer]" (which disclaims liability or warranty for this information). If you have questions about a medical condition or this instruction, always ask your healthcare professional. [address] any warranty or liability for your use of this information.

## 2020-01-14 NOTE — PROGRESS NOTES
1. Have you been to the ER, urgent care clinic since your last visit? Hospitalized since your last visit? No    2. Have you seen or consulted any other health care providers outside of the [address] [place] [address] since your last visit? Include any pap smears or colon screening. Dr. Rafiq Cottrell LOV: 12/08/19 and has a follow-up with her on 2/17/2020. Patient has an appointment with [address] [place] [address] on 1/15/2020.     Chief Complaint   Patient presents with    Knee Swelling     fluid in knees x 1.5 weeks and hard to walk up and down stairs

## 2020-01-16 ENCOUNTER — HOSPITAL ENCOUNTER (OUTPATIENT)
Dept: GENERAL RADIOLOGY | Age: 50
Discharge: HOME | End: 2020-01-16
Payer: MEDICAID

## 2020-01-16 ENCOUNTER — HOSPITAL ENCOUNTER (OUTPATIENT)
Dept: LAB | Age: 50
Discharge: HOME | End: 2020-01-16

## 2020-01-16 DIAGNOSIS — M25.461 BILATERAL KNEE SWELLING: ICD-10-CM

## 2020-01-16 DIAGNOSIS — M25.462 BILATERAL KNEE SWELLING: ICD-10-CM

## 2020-01-16 LAB — XX-LABCORP SPECIMEN COL,LCBCF: NORMAL

## 2020-01-16 PROCEDURE — 73560 X-RAY EXAM OF KNEE 1 OR 2: CPT

## 2020-01-16 PROCEDURE — 99001 SPECIMEN HANDLING PT-LAB: CPT

## 2020-01-17 ENCOUNTER — DOCUMENTATION ONLY (OUTPATIENT)
Dept: NEUROLOGY | Age: 50
End: 2020-01-17

## 2020-01-17 NOTE — PROGRESS NOTES
Contacted patient and verified identity using name and date of birth (2- identifiers)  Spoke with patient and she verbalized understanding of arthritis and wishes to see Ortho at this time.

## 2020-01-17 NOTE — PROGRESS NOTES
Called Patient to tell her to call her insurance company to see who will cover her sleep study. Patient said she will call and call me back so we can give them a order.

## 2020-01-21 DIAGNOSIS — M25.561 CHRONIC PAIN OF BOTH KNEES: Primary | ICD-10-CM

## 2020-01-21 DIAGNOSIS — M25.562 CHRONIC PAIN OF BOTH KNEES: Primary | ICD-10-CM

## 2020-01-21 DIAGNOSIS — G89.29 CHRONIC PAIN OF BOTH KNEES: Primary | ICD-10-CM

## 2020-01-24 DIAGNOSIS — R53.82 CHRONIC FATIGUE: ICD-10-CM

## 2020-01-24 DIAGNOSIS — R52 PAIN OF MULTIPLE SITES: ICD-10-CM

## 2020-01-24 DIAGNOSIS — F32.89 OTHER DEPRESSION: ICD-10-CM

## 2020-01-24 NOTE — TELEPHONE ENCOUNTER
This pharmacy faxed over request for the following prescriptions to be filled:    Medication requested :   Requested Prescriptions     Pending Prescriptions Disp Refills    DULoxetine (CYMBALTA) 60 mg capsule 90 Cap 0     Sig: Take 1 Cap by mouth two (2) times a day.      PCP: [address] [place] [address] or Print: [facility]  Mail order or Local pharmacy [address] [place] [address]     Scheduled appointment if not seen by current providers in office: LOV 1/14/2020 F/U 3/16/2020

## 2020-01-27 RX ORDER — DULOXETIN HYDROCHLORIDE 60 MG/1
60 CAPSULE, DELAYED RELEASE ORAL 2 TIMES DAILY
Qty: 180 CAP | Refills: 1 | Status: SHIPPED | OUTPATIENT
Start: 2020-01-27 | End: 2020-03-16 | Stop reason: SDUPTHER

## 2020-02-02 ENCOUNTER — HOSPITAL ENCOUNTER (EMERGENCY)
Age: 50
Discharge: HOME | End: 2020-02-02
Attending: EMERGENCY MEDICINE
Payer: MEDICAID

## 2020-02-02 VITALS
HEART RATE: 74 BPM | OXYGEN SATURATION: 99 % | TEMPERATURE: 98.6 F | BODY MASS INDEX: 29.99 KG/M2 | RESPIRATION RATE: 20 BRPM | WEIGHT: 180 LBS | DIASTOLIC BLOOD PRESSURE: 70 MMHG | HEIGHT: 65 IN | SYSTOLIC BLOOD PRESSURE: 120 MMHG

## 2020-02-02 DIAGNOSIS — N30.01 ACUTE CYSTITIS WITH HEMATURIA: Primary | ICD-10-CM

## 2020-02-02 DIAGNOSIS — A59.9 TRICHOMONAL INFECTION: ICD-10-CM

## 2020-02-02 LAB
APPEARANCE UR: CLEAR
BACTERIA URNS QL MICRO: ABNORMAL /HPF
BILIRUB UR QL: NEGATIVE
COLOR UR: ABNORMAL
EPITH CASTS URNS QL MICRO: ABNORMAL /LPF (ref 0–5)
GLUCOSE UR STRIP.AUTO-MCNC: NEGATIVE MG/DL
HGB UR QL STRIP: ABNORMAL
KETONES UR QL STRIP.AUTO: NEGATIVE MG/DL
LEUKOCYTE ESTERASE UR QL STRIP.AUTO: ABNORMAL
NITRITE UR QL STRIP.AUTO: POSITIVE
PH UR STRIP: 5 [PH] (ref 5–8)
PROT UR STRIP-MCNC: NEGATIVE MG/DL
RBC #/AREA URNS HPF: ABNORMAL /HPF (ref 0–5)
SP GR UR REFRACTOMETRY: 1.02 (ref 1–1.03)
TRICHOMONAS UR QL MICRO: ABNORMAL
UROBILINOGEN UR QL STRIP.AUTO: 1 EU/DL (ref 0.2–1)
WBC URNS QL MICRO: ABNORMAL /HPF (ref 0–4)

## 2020-02-02 PROCEDURE — 81001 URINALYSIS AUTO W/SCOPE: CPT

## 2020-02-02 PROCEDURE — 87077 CULTURE AEROBIC IDENTIFY: CPT

## 2020-02-02 PROCEDURE — 99283 EMERGENCY DEPT VISIT LOW MDM: CPT

## 2020-02-02 PROCEDURE — 87186 SC STD MICRODIL/AGAR DIL: CPT

## 2020-02-02 PROCEDURE — 87086 URINE CULTURE/COLONY COUNT: CPT

## 2020-02-02 RX ORDER — CEPHALEXIN 500 MG/1
500 CAPSULE ORAL 4 TIMES DAILY
Qty: 28 CAP | Refills: 0 | Status: SHIPPED | OUTPATIENT
Start: 2020-02-02 | End: 2020-02-09

## 2020-02-02 RX ORDER — PHENAZOPYRIDINE HYDROCHLORIDE 200 MG/1
200 TABLET, FILM COATED ORAL 3 TIMES DAILY
Qty: 6 TAB | Refills: 0 | Status: SHIPPED | OUTPATIENT
Start: 2020-02-02 | End: 2020-02-04

## 2020-02-02 RX ORDER — METRONIDAZOLE 500 MG/1
500 TABLET ORAL 2 TIMES DAILY
Qty: 14 TAB | Refills: 0 | Status: SHIPPED | OUTPATIENT
Start: 2020-02-02 | End: 2020-02-09

## 2020-02-02 NOTE — ED TRIAGE NOTES
Pt thinks she has a UTI. C/o left flank pain and states her urine has a foul odor.  Denies any discharge

## 2020-02-02 NOTE — DISCHARGE INSTRUCTIONS
Trichomoniasis: Care Instructions  Your Care Instructions  Trichomoniasis is a sexually transmitted infection (STI) that is spread by having sex with an infected partner. Trichomoniasis is commonly called trich (say \"trick\"). In women, trich may cause vaginal itching and a smelly discharge. But in many cases, especially in men, there are no symptoms. Lien Guidry is treated so that you do not spread it to others. Both you and your sex partner or partners should be treated at the same time so you do not infect each other again. Trich may cause problems with pregnancy. Your doctor will talk with you about treatment for Trich if you are pregnant. Follow-up care is a key part of your treatment and safety. Be sure to make and go to all appointments, and call your doctor if you are having problems. It's also a good idea to know your test results and keep a list of the medicines you take. How can you care for yourself at home? · Take your antibiotics as directed. Do not stop taking them just because you feel better. You need to take the full course of antibiotics. · Do not have sex while you are being treated. If your doctor gave you a single dose of antibiotics, do not have sex for one week after being treated and until your partner also has been treated. · Tell your sex partner (or partners) that he or she will also need to be tested and treated. · Use a cold water compress or cool baths to relieve itching. To prevent trichomoniasis in the future  · Use latex condoms every time you have sex. Use them from the beginning to the end of sexual contact. · Talk to your partner before having sex. Find out if he or she has or is at risk for trich or any other STI. Keep in mind that a person may be able to spread an STI even if he or she does not have symptoms. · Do not have sex if you are being treated for trich or any other STI.   · Do not have sex with anyone who has symptoms of an STI, such as sores on the genitals or mouth.  · Having one sex partner (who does not have STIs and does not have sex with anyone else) is a good way to avoid STIs. When should you call for help? Call your doctor now or seek immediate medical care if:    · You have unusual vaginal bleeding.     · You have a fever.     · You have new discharge from the vagina or penis.     · You have pelvic pain.    Watch closely for changes in your health, and be sure to contact your doctor if:    · You do not get better as expected.     · You have any new symptoms or your symptoms get worse. Where can you learn more? Go to http://kosta-michelle.info/. Enter X457 in the search box to learn more about \"Trichomoniasis: Care Instructions. \"  Current as of: September 11, 2018  Content Version: 12.2  © 0930-6699 [employer]. Care instructions adapted under license by [employer] (which disclaims liability or warranty for this information). If you have questions about a medical condition or this instruction, always ask your healthcare professional. [place] [address] any warranty or liability for your use of this information. Patient Education        Urinary Tract Infection in Women: Care Instructions  Your Care Instructions    A urinary tract infection, or UTI, is a general term for an infection anywhere between the kidneys and the urethra (where urine comes out). Most UTIs are bladder infections. They often cause pain or burning when you urinate. UTIs are caused by bacteria and can be cured with antibiotics. Be sure to complete your treatment so that the infection goes away. Follow-up care is a key part of your treatment and safety. Be sure to make and go to all appointments, and call your doctor if you are having problems. It's also a good idea to know your test results and keep a list of the medicines you take. How can you care for yourself at home? · Take your antibiotics as directed.  Do not stop taking them just because you feel better. You need to take the full course of antibiotics. · Drink extra water and other fluids for the next day or two. This may help wash out the bacteria that are causing the infection. (If you have kidney, heart, or liver disease and have to limit fluids, talk with your doctor before you increase your fluid intake.)  · Avoid drinks that are carbonated or have caffeine. They can irritate the bladder. · Urinate often. Try to empty your bladder each time. · To relieve pain, take a hot bath or lay a heating pad set on low over your lower belly or genital area. Never go to sleep with a heating pad in place. To prevent UTIs  · Drink plenty of water each day. This helps you urinate often, which clears bacteria from your system. (If you have kidney, heart, or liver disease and have to limit fluids, talk with your doctor before you increase your fluid intake.)  · Urinate when you need to. · Urinate right after you have sex. · Change sanitary pads often. · Avoid douches, bubble baths, feminine hygiene sprays, and other feminine hygiene products that have deodorants. · After going to the bathroom, wipe from front to back. When should you call for help? Call your doctor now or seek immediate medical care if:    · Symptoms such as fever, chills, nausea, or vomiting get worse or appear for the first time.     · You have new pain in your back just below your rib cage. This is called flank pain.     · There is new blood or pus in your urine.     · You have any problems with your antibiotic medicine.    Watch closely for changes in your health, and be sure to contact your doctor if:    · You are not getting better after taking an antibiotic for 2 days.     · Your symptoms go away but then come back. Where can you learn more? Go to http://kosta-michelle.info/. Enter R223 in the search box to learn more about \"Urinary Tract Infection in Women: Care Instructions. \"  Current as of: 2018  Content Version: 12.2  © 4121-3702 [employer]. Care instructions adapted under license by [employer] (which disclaims liability or warranty for this information). If you have questions about a medical condition or this instruction, always ask your healthcare professional. [address] any warranty or liability for your use of this information. MoblyngharShareDesk Activation    Thank you for requesting access to BettingXpert. Please follow the instructions below to securely access and download your online medical record. BettingXpert allows you to send messages to your doctor, view your test results, renew your prescriptions, schedule appointments, and more. How Do I Sign Up? 1. In your internet browser, go to [email or web address]  2. Click on the First Time User? Click Here link in the Sign In box. You will be redirect to the New Member Sign Up page. 3. Enter your BettingXpert Access Code exactly as it appears below. You will not need to use this code after youve completed the sign-up process. If you do not sign up before the expiration date, you must request a new code. BettingXpert Access Code: Activation code not generated  Current BettingXpert Status: Active (This is the date your BettingXpert access code will )    4. Enter the last four digits of your Social Security Number (xxxx) and Date of Birth (mm/dd/yyyy) as indicated and click Submit. You will be taken to the next sign-up page. 5. Create a BettingXpert ID. This will be your BettingXpert login ID and cannot be changed, so think of one that is secure and easy to remember. 6. Create a BettingXpert password. You can change your password at any time. 7. Enter your Password Reset Question and Answer. This can be used at a later time if you forget your password. 8. Enter your e-mail address. You will receive e-mail notification when new information is available in [address] 9. Click Sign Up.  You can now view and download portions of your medical record. 10. Click the Download Summary menu link to download a portable copy of your medical information. Additional Information    If you have questions, please visit the Frequently Asked Questions section of the Lollipuff website at [email or web address] Voxel (Internap). GrowYo/U[employer]M [email or web address] Remember, Lollipuff is NOT to be used for urgent needs. For medical emergencies, dial 911.

## 2020-02-02 NOTE — ROUTINE PROCESS
Jazlyn Ruzi is a 54 y.o. female that was discharged in stable. Pt was accompanied by son. Pt is not driving. The patients diagnosis, condition and treatment were explained to  patient and aftercare instructions were given. The patient verbalized understanding. Patient armband removed and shredded.

## 2020-02-02 NOTE — ED PROVIDER NOTES
EMERGENCY DEPARTMENT HISTORY AND PHYSICAL EXAM    Date: 2/2/2020  Patient Name: Fiona Allen    History of Presenting Illness     Chief Complaint   Patient presents with    Bladder Infection    Flank Pain         History Provided By:patient     Chief Complaint: possible UTI  Duration: 1 week  Timing: acute  Location: flank and low back   Quality: aching  Severity:moderate   Modifying Factors: none  Associated Symptoms: flank pain, back pain, urinary frequency and urgency       Additional History (Context): Fiona Allen is a 54 y.o. female with PMH anemia, arthritis, and uterine fibroids who presents with c/o 1 week of urinary frequency and urgency, flank pain, and low back pain. Patient denies abdominal pain. No other complaints reported at this time. PCP: Lesle Harada, MD    Current Outpatient Medications   Medication Sig Dispense Refill    cephALEXin (KEFLEX) 500 mg capsule Take 1 Cap by mouth four (4) times daily for 7 days. 28 Cap 0    phenazopyridine (PYRIDIUM) 200 mg tablet Take 1 Tab by mouth three (3) times daily for 2 days. 6 Tab 0    metroNIDAZOLE (FLAGYL) 500 mg tablet Take 1 Tab by mouth two (2) times a day for 7 days. 14 Tab 0    DULoxetine (CYMBALTA) 60 mg capsule Take 1 Cap by mouth two (2) times a day. 180 Cap 1    meloxicam (MOBIC) 7.5 mg tablet Take 7.5 mg by mouth daily.  naproxen (NAPROSYN) 500 mg tablet TAKE 1 TAB BY MOUTH TWO (2) TIMES DAILY AS NEEDED (PAIN).  61 Tab 1       Past History     Past Medical History:  Past Medical History:   Diagnosis Date    Anemia     Arthritis     Fibroids     uterine fibroids    Psychiatric disorder     anxiety       Past Surgical History:  Past Surgical History:   Procedure Laterality Date    HX HYSTERECTOMY      HX OTHER SURGICAL      surgery for punctured intestine    HX OVARIAN CYST REMOVAL Bilateral     HX TUBAL LIGATION         Family History:  Family History   Problem Relation Age of Onset    Hypertension Mother  Diabetes Mother     Breast Cancer Maternal Aunt        Social History:  Social History     Tobacco Use    Smoking status: Never Smoker    Smokeless tobacco: Never Used   Substance Use Topics    Alcohol use: No    Drug use: No       Allergies:  No Known Allergies      Review of Systems   Review of Systems   Constitutional: Negative. Negative for chills and fever. HENT: Negative. Negative for congestion, ear pain and rhinorrhea. Eyes: Negative. Negative for pain and redness. Respiratory: Negative. Negative for cough, shortness of breath, wheezing and stridor. Cardiovascular: Negative. Negative for chest pain and leg swelling. Gastrointestinal: Negative. Negative for abdominal pain, constipation, diarrhea, nausea and vomiting. Genitourinary: Positive for flank pain, frequency and urgency. Negative for dysuria. Musculoskeletal: Positive for back pain. Negative for neck pain. Skin: Negative. Negative for rash and wound. Neurological: Negative. Negative for dizziness, seizures, syncope and headaches. All other systems reviewed and are negative. All Other Systems Negative  Physical Exam     Vitals:    02/02/20 1446   BP: 120/70   Pulse: 74   Resp: 20   Temp: 98.6 °F (37 °C)   SpO2: 99%   Weight: 81.6 kg (180 lb)   Height: 5' 5\" (1.651 m)     Physical Exam  Vitals signs and nursing note reviewed. Constitutional:       General: She is not in acute distress. Appearance: She is well-developed. She is not diaphoretic. HENT:      Head: Normocephalic and atraumatic. Eyes:      General: No scleral icterus. Right eye: No discharge. Left eye: No discharge. Conjunctiva/sclera: Conjunctivae normal.   Neck:      Musculoskeletal: Normal range of motion and neck supple. Cardiovascular:      Rate and Rhythm: Normal rate and regular rhythm. Heart sounds: Normal heart sounds. No murmur. No friction rub. No gallop.     Pulmonary:      Effort: Pulmonary effort is normal. No respiratory distress. Breath sounds: Normal breath sounds. No stridor. No wheezing or rales. Abdominal:      General: Bowel sounds are normal. There is no distension. Palpations: Abdomen is soft. There is no mass. Tenderness: There is no right CVA tenderness, left CVA tenderness or guarding. Musculoskeletal: Normal range of motion. Skin:     General: Skin is warm and dry. Findings: No erythema or rash. Neurological:      Mental Status: She is alert and oriented to person, place, and time. Coordination: Coordination normal.      Comments: Gait is steady and patient exhibits no evidence of ataxia. Patient is able to ambulate without difficulty. No focal neurological deficit noted. No facial droop, slurred speech, or evidence of altered mentation noted on exam.     Psychiatric:         Behavior: Behavior normal.         Thought Content: Thought content normal.                Diagnostic Study Results     Labs -     Recent Results (from the past 12 hour(s))   URINALYSIS W/ RFLX MICROSCOPIC    Collection Time: 02/02/20  3:12 PM   Result Value Ref Range    Color DARK YELLOW      Appearance CLEAR      Specific gravity 1.018 1.005 - 1.030      pH (UA) 5.0 5.0 - 8.0      Protein NEGATIVE  NEG mg/dL    Glucose NEGATIVE  NEG mg/dL    Ketone NEGATIVE  NEG mg/dL    Bilirubin NEGATIVE  NEG      Blood MODERATE (A) NEG      Urobilinogen 1.0 0.2 - 1.0 EU/dL    Nitrites POSITIVE (A) NEG      Leukocyte Esterase MODERATE (A) NEG     URINE MICROSCOPIC ONLY    Collection Time: 02/02/20  3:12 PM   Result Value Ref Range    WBC 4 to 10 0 - 4 /hpf    RBC 4 to 10 0 - 5 /hpf    Epithelial cells FEW 0 - 5 /lpf    Bacteria 2+ (A) NEG /hpf    Trichomonas FEW (A) NEG         Radiologic Studies -   No orders to display     CT Results  (Last 48 hours)    None        CXR Results  (Last 48 hours)    None            Medical Decision Making   I am the first provider for this patient.     I reviewed the vital signs, available nursing notes, past medical history, past surgical history, family history and social history. Vital Signs-Reviewed the patient's vital signs. Records Reviewed: Cassi Briscoe PA-C     Procedures:  Procedures    Provider Notes (Medical Decision Making): Impression:  UTI    UA consistent with UTI, sent for culture, trichomonas noted, pt notified and counseled, will plan to d/c with keflex flagyl and pyridium with pcp follow-up. Pt agrees. Cassi Briscoe PA-C     MED RECONCILIATION:  No current facility-administered medications for this encounter. Current Outpatient Medications   Medication Sig    cephALEXin (KEFLEX) 500 mg capsule Take 1 Cap by mouth four (4) times daily for 7 days.  phenazopyridine (PYRIDIUM) 200 mg tablet Take 1 Tab by mouth three (3) times daily for 2 days.  metroNIDAZOLE (FLAGYL) 500 mg tablet Take 1 Tab by mouth two (2) times a day for 7 days.  DULoxetine (CYMBALTA) 60 mg capsule Take 1 Cap by mouth two (2) times a day.  meloxicam (MOBIC) 7.5 mg tablet Take 7.5 mg by mouth daily.  naproxen (NAPROSYN) 500 mg tablet TAKE 1 TAB BY MOUTH TWO (2) TIMES DAILY AS NEEDED (PAIN). Disposition:  D/c    DISCHARGE NOTE:   Patient is stable for discharge at this time. I have discussed all the findings from today's work up with the patient, including lab results and imaging. I have answered all questions. Rx for keflex flagyl and pyridium given. Rest and close follow-up with the PCP recommended this week. Return to the ED immediately for any new or worsening symptoms.   Cassi Garcia PA-C     Follow-up Information     Follow up With Specialties Details Why Contact Info    Ana Etienne MD [facility] Schedule an appointment as soon as possible for a visit in 1 week  [address]  [address] [address]      [id number] [place] [address] EMERGENCY DEPT Emergency Medicine  If symptoms worsen, As needed Chandra Guzmán [id number]  [phone]          Current Discharge Medication List      START taking these medications    Details   cephALEXin (KEFLEX) 500 mg capsule Take 1 Cap by mouth four (4) times daily for 7 days. Qty: 28 Cap, Refills: 0      phenazopyridine (PYRIDIUM) 200 mg tablet Take 1 Tab by mouth three (3) times daily for 2 days. Qty: 6 Tab, Refills: 0      metroNIDAZOLE (FLAGYL) 500 mg tablet Take 1 Tab by mouth two (2) times a day for 7 days. Qty: 14 Tab, Refills: 0                 Diagnosis     Clinical Impression:   1. Acute cystitis with hematuria    2.  Trichomonal infection

## 2020-02-04 ENCOUNTER — OFFICE VISIT (OUTPATIENT)
Dept: ORTHOPEDIC SURGERY | Facility: CLINIC | Age: 50
End: 2020-02-04

## 2020-02-04 VITALS
HEART RATE: 75 BPM | RESPIRATION RATE: 18 BRPM | OXYGEN SATURATION: 100 % | WEIGHT: 199 LBS | SYSTOLIC BLOOD PRESSURE: 121 MMHG | DIASTOLIC BLOOD PRESSURE: 68 MMHG | TEMPERATURE: 97.2 F | HEIGHT: 65 IN | BODY MASS INDEX: 33.15 KG/M2

## 2020-02-04 DIAGNOSIS — M76.61 ACHILLES TENDINITIS OF BOTH LOWER EXTREMITIES: ICD-10-CM

## 2020-02-04 DIAGNOSIS — M25.561 CHRONIC PAIN OF RIGHT KNEE: ICD-10-CM

## 2020-02-04 DIAGNOSIS — M25.562 CHRONIC PAIN OF LEFT KNEE: ICD-10-CM

## 2020-02-04 DIAGNOSIS — M11.20 PSEUDOGOUT: ICD-10-CM

## 2020-02-04 DIAGNOSIS — M17.11 PRIMARY OSTEOARTHRITIS OF RIGHT KNEE: ICD-10-CM

## 2020-02-04 DIAGNOSIS — M76.62 ACHILLES TENDINITIS OF BOTH LOWER EXTREMITIES: ICD-10-CM

## 2020-02-04 DIAGNOSIS — M79.7 FIBROMYALGIA: ICD-10-CM

## 2020-02-04 DIAGNOSIS — G89.29 CHRONIC PAIN OF RIGHT KNEE: ICD-10-CM

## 2020-02-04 DIAGNOSIS — M17.12 PRIMARY OSTEOARTHRITIS OF LEFT KNEE: Primary | ICD-10-CM

## 2020-02-04 DIAGNOSIS — G89.29 CHRONIC PAIN OF LEFT KNEE: ICD-10-CM

## 2020-02-04 RX ORDER — SERTRALINE HYDROCHLORIDE 50 MG/1
TABLET, FILM COATED ORAL
COMMUNITY
Start: 2020-01-15 | End: 2020-08-22

## 2020-02-04 RX ORDER — TRAZODONE HYDROCHLORIDE 100 MG/1
TABLET ORAL
COMMUNITY
Start: 2020-01-15 | End: 2020-08-22

## 2020-02-04 NOTE — PROGRESS NOTES
Patient: Arjun Enamorado                MRN: [id number]       SSN: [id number]  YOB: 1970        AGE: 54 y.o. SEX: female    PCP: Irving Wells MD  02/04/20    CC: BILATERAL KNEE AND BILATERAL ANKLE PAIN    HISTORY:  Arjun Enamorado is a 54 y.o. female who is seen for bilateral knee and bilateral ankle pain. She has been experiencing knee pain for the past several months. She does not recall any injury. She notes pain with standing, walking and stair climbing. She experiences startup pain after sitting. She reports her knees swell occasionally. She is also seen for bilateral ankle pain. She feels pain at her Felisa's tendon insertion sites. She has foot and ankle pain with standing and walking. She does not recall any injury. She has a h/o fibromyalgia. Dr. Barak Olmos is her rheumatologist. She has seen another rheumatologist, Dr. Jasmin Whiting, who diagnosed her with CPPD. She was provided Mobic. She has seen Dr. Destin Robledo for bilateral CTS. Pain Assessment  2/4/2020   Location of Pain Knee   Location Modifiers Left;Right   Severity of Pain 6   Quality of Pain Aching   Quality of Pain Comment -   Duration of Pain Persistent   Frequency of Pain Constant   Aggravating Factors Walking;Standing;Bending   Aggravating Factors Comment -   Limiting Behavior Yes   Relieving Factors Rest   Relieving Factors Comment -   Result of Injury No     Occupation, etc:  Ms. Reina Tidwell is applying for social security disability benefits. 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 She stopped working in January 2019 due to multiple fibromyalgia related pains. She gained weight recently. Ms. Reina Tidwell weighs 199 lbs and is 5'5\" tall.        Lab Results   Component Value Date/Time    Hemoglobin A1c 5.3 12/07/2018 12:00 AM     Weight Metrics 2/4/2020 2/2/2020 1/14/2020 12/16/2019 11/20/2019 11/4/2019 10/16/2019   Weight 199 lb 180 lb 198 lb 9.6 oz 195 lb 12.8 oz 193 lb 194 lb 197 lb   BMI 33.12 kg/m2 29.95 kg/m2 34.09 kg/m2 33.61 kg/m2 33.13 kg/m2 31.31 kg/m2 31.8 kg/m2       Patient Active Problem List   Diagnosis Code    Depression, major, severe recurrence ([facility] F33.2     REVIEW OF SYSTEMS: All Below are Negative except: See HPI   Constitutional: negative for fever, chills, and weight loss. Cardiovascular: negative for chest pain, claudication, leg swelling, SOB, GONZALEZ   Gastrointestinal: Negative for pain, N/V/C/D, Blood in stool or urine, dysuria, hematuria, incontinence, pelvic pain. Musculoskeletal: See HPI   Neurological: Negative for dizziness and weakness. Negative for headaches, Visual changes, confusion, seizures   Phychiatric/Behavioral: Negative for depression, memory loss, substance abuse. Extremities: Negative for hair changes, rash, or skin lesion changes. Hematologic: Negative for bleeding problems, bruising, pallor or swollen lymph nodes   Peripheral Vascular: No calf pain, no circulation deficits.     Social History     Socioeconomic History    Marital status: SINGLE     Spouse name: Not on file    Number of children: Not on file    Years of education: Not on file    Highest education level: Not on file   Occupational History    Not on file   Social Needs    Financial resource strain: Not on file    Food insecurity:     Worry: Not on file     Inability: Not on file    Transportation needs:     Medical: Not on file     Non-medical: Not on file   Tobacco Use    Smoking status: Never Smoker    Smokeless tobacco: Never Used   Substance and Sexual Activity    Alcohol use: No    Drug use: No    Sexual activity: Not Currently     Partners: Male   Lifestyle    Physical activity:     Days per week: Not on file     Minutes per session: Not on file    Stress: Not on file   Relationships    Social connections:     Talks on phone: Not on file     Gets together: Not on file     Attends [employer] service: Not on file     Active member of club or organization: Not on file Attends meetings of clubs or organizations: Not on file     Relationship status: Not on file    Intimate partner violence:     Fear of current or ex partner: Not on file     Emotionally abused: Not on file     Physically abused: Not on file     Forced sexual activity: Not on file   Other Topics Concern    Not on file   Social History Narrative    Not on file      No Known Allergies   Current Outpatient Medications   Medication Sig    sertraline (ZOLOFT) 50 mg tablet     traZODone (DESYREL) 100 mg tablet     cephALEXin (KEFLEX) 500 mg capsule Take 1 Cap by mouth four (4) times daily for 7 days.  metroNIDAZOLE (FLAGYL) 500 mg tablet Take 1 Tab by mouth two (2) times a day for 7 days.  DULoxetine (CYMBALTA) 60 mg capsule Take 1 Cap by mouth two (2) times a day.  meloxicam (MOBIC) 7.5 mg tablet Take 7.5 mg by mouth daily.  phenazopyridine (PYRIDIUM) 200 mg tablet Take 1 Tab by mouth three (3) times daily for 2 days.  naproxen (NAPROSYN) 500 mg tablet TAKE 1 TAB BY MOUTH TWO (2) TIMES DAILY AS NEEDED (PAIN). No current facility-administered medications for this visit. PHYSICAL EXAMINATION:  Visit Vitals  /68   Pulse 75   Temp 97.2 °F (36.2 °C) (Oral)   Resp 18   Ht 5' 5\" (1.651 m)   Wt 199 lb (90.3 kg)   LMP 01/15/2016   SpO2 100%   BMI 33.12 kg/m²   Appearance: Alert, well appearing and pleasant patient who is in no distress, oriented to person, place/time, and who follows commands. HEENT: Ami Bautista hears well, does not require hearing aids. Her sclera of the eyes are non-icteric. She is breathing normally and no respiratory accessory muscle use is noted. No JVD present and Neck ROM within normal limits. Psychiatric: Affect and mood are appropriate. Oriented x3  Cardiovascular/Peripheral Vascular: Normal pulses to each foot. Integumentary: No rashes. Warm and normal color. No drainage.    Gait: normal  Sensory Exam: Intact/Normal Sensation    Lymphatic: No evidence of Lymphedema  Vascular:       Pulses: palpable  Varicosities none  Wounds/Abrasion: None Present  Neuro: Negative, no tremors   ORTHO EXAMINATION:  Examination Right knee Left knee   Skin Intact Intact   Range of motion 90-0 90-0   Effusion - -   Medial joint line tenderness + +   Lateral joint line tenderness - -   Popliteal tenderness - -   Osteophytes palpable - -   Kamrans - -   Patella crepitus - -   Anterior drawer - -   Lateral laxity - -   Medial laxity - -   Varus deformity - -   Valgus deformity - -   Pretibial edema - -   Calf tenderness - -     Examination Right Ankle/Foot Left Ankle/Foot   Skin Intact Intact   Swelling - -   Dorsiflexion 10 10   Plantarflexion 25 25   Deformity - -   Inversion laxity - -   Anterior drawer - -   Medial tenderness - -   Lateral tenderness + Wilmot's + Felisa's   Heel cord Intact Intact   Sensation Intact Intact   Bunion - -   Toe nails Normal Normal   Capillary refill Normal Normal       RADIOGRAPHS:  XR BILAT KNEE 1/16/20 [facility]  IMPRESSION:  1. No evidence of acute fracture or dislocation. 2.  Mild medial compartment osteoarthritis. -I have independently reviewed these images during this office visit. -Dr. Ashleigh Pinzon:  Three views - No fractures, no effusion, mild medial joint space narrowing, + osteophytes present. Kellgren Gómez grade 1     IMPRESSION:      ICD-10-CM ICD-9-CM    1. Primary osteoarthritis of left knee M17.12 715.16 PROCEDURE AUTHORIZATION TO    2. Fibromyalgia M79.7 729.1    3. Pseudogout M11.20 275.49      712.20    4. Chronic pain of left knee M25.562 719.46 PROCEDURE AUTHORIZATION TO     G89.29 338.29    5. Primary osteoarthritis of right knee M17.11 715.16 PROCEDURE AUTHORIZATION TO    6. Chronic pain of right knee M25.561 719.46 PROCEDURE AUTHORIZATION TO     G89.29 338.29    7.  Achilles tendinitis of both lower extremities M76.61 726.71     M76.62       PLAN:  Consider visco supplementation if pain continues. There is no need for surgery at this time. Dietary counseling provided today. Start weight loss with low carb diet and intermittent fasting. She will follow up as needed.       Scribed by Bert Woody ([address]) as dictated by Norma Villalta MD

## 2020-02-05 LAB
BACTERIA SPEC CULT: ABNORMAL
SERVICE CMNT-IMP: ABNORMAL

## 2020-03-16 ENCOUNTER — OFFICE VISIT (OUTPATIENT)
Dept: FAMILY MEDICINE CLINIC | Age: 50
End: 2020-03-16

## 2020-03-16 VITALS
WEIGHT: 198.6 LBS | OXYGEN SATURATION: 97 % | TEMPERATURE: 98.1 F | BODY MASS INDEX: 33.09 KG/M2 | HEIGHT: 65 IN | RESPIRATION RATE: 16 BRPM | HEART RATE: 72 BPM | SYSTOLIC BLOOD PRESSURE: 122 MMHG | DIASTOLIC BLOOD PRESSURE: 68 MMHG

## 2020-03-16 DIAGNOSIS — M25.562 PAIN IN BOTH KNEES, UNSPECIFIED CHRONICITY: ICD-10-CM

## 2020-03-16 DIAGNOSIS — F32.89 OTHER DEPRESSION: ICD-10-CM

## 2020-03-16 DIAGNOSIS — M25.561 PAIN IN BOTH KNEES, UNSPECIFIED CHRONICITY: ICD-10-CM

## 2020-03-16 DIAGNOSIS — M25.60 STIFFNESS IN JOINT: ICD-10-CM

## 2020-03-16 DIAGNOSIS — E04.2 MULTINODULAR GOITER: ICD-10-CM

## 2020-03-16 DIAGNOSIS — R92.8 ABNORMAL MAMMOGRAM: ICD-10-CM

## 2020-03-16 DIAGNOSIS — M54.9 DISCOMFORT OF BACK: ICD-10-CM

## 2020-03-16 DIAGNOSIS — M79.7 FIBROMYALGIA: Primary | ICD-10-CM

## 2020-03-16 DIAGNOSIS — R53.82 CHRONIC FATIGUE: ICD-10-CM

## 2020-03-16 DIAGNOSIS — R52 PAIN OF MULTIPLE SITES: ICD-10-CM

## 2020-03-16 DIAGNOSIS — M25.50 MULTIPLE JOINT PAIN: ICD-10-CM

## 2020-03-16 RX ORDER — DULOXETIN HYDROCHLORIDE 60 MG/1
60 CAPSULE, DELAYED RELEASE ORAL 2 TIMES DAILY
Qty: 180 CAP | Refills: 1 | Status: SHIPPED | OUTPATIENT
Start: 2020-03-16 | End: 2020-08-22

## 2020-03-16 RX ORDER — NAPROXEN 500 MG/1
500 TABLET ORAL
Qty: 60 TAB | Refills: 1 | Status: SHIPPED | OUTPATIENT
Start: 2020-03-16 | End: 2020-07-28 | Stop reason: ALTCHOICE

## 2020-03-16 NOTE — PATIENT INSTRUCTIONS
Back Pain: Care Instructions Your Care Instructions Back pain has many possible causes. It is often related to problems with muscles and ligaments of the back. It may also be related to problems with the nerves, discs, or bones of the back. Moving, lifting, standing, sitting, or sleeping in an awkward way can strain the back. Sometimes you don't notice the injury until later. Arthritis is another common cause of back pain. Although it may hurt a lot, back pain usually improves on its own within several weeks. Most people recover in 12 weeks or less. Using good home treatment and being careful not to stress your back can help you feel better sooner. Follow-up care is a key part of your treatment and safety. Be sure to make and go to all appointments, and call your doctor if you are having problems. It's also a good idea to know your test results and keep a list of the medicines you take. How can you care for yourself at home? · Sit or lie in positions that are most comfortable and reduce your pain. Try one of these positions when you lie down: ? Lie on your back with your knees bent and supported by large pillows. ? Lie on the floor with your legs on the seat of a sofa or chair. ? Lie on your side with your knees and hips bent and a pillow between your legs. ? Lie on your stomach if it does not make pain worse. · Do not sit up in bed, and avoid soft couches and twisted positions. Bed rest can help relieve pain at first, but it delays healing. Avoid bed rest after the first day of back pain. · Change positions every 30 minutes. If you must sit for long periods of time, take breaks from sitting. Get up and walk around, or lie in a comfortable position. · Try using a heating pad on a low or medium setting for 15 to 20 minutes every 2 or 3 hours. Try a warm shower in place of one session with the heating pad. · You can also try an ice pack for 10 to 15 minutes every 2 to 3 hours. Put a thin cloth between the ice pack and your skin. · Take pain medicines exactly as directed. ? If the doctor gave you a prescription medicine for pain, take it as prescribed. ? If you are not taking a prescription pain medicine, ask your doctor if you can take an over-the-counter medicine. · Take short walks several times a day. You can start with 5 to 10 minutes, 3 or 4 times a day, and work up to longer walks. Walk on level surfaces and avoid hills and stairs until your back is better. · Return to work and other activities as soon as you can. Continued rest without activity is usually not good for your back. · To prevent future back pain, do exercises to stretch and strengthen your back and stomach. Learn how to use good posture, safe lifting techniques, and proper body mechanics. When should you call for help? Call your doctor now or seek immediate medical care if: 
  · You have new or worsening numbness in your legs.  
  · You have new or worsening weakness in your legs. (This could make it hard to stand up.)  
  · You lose control of your bladder or bowels.  
 Watch closely for changes in your health, and be sure to contact your doctor if: 
  · You have a fever, lose weight, or don't feel well.  
  · You do not get better as expected. Where can you learn more? Go to http://kosta-michelle.info/ Enter N507 in the search box to learn more about \"Back Pain: Care Instructions. \" Current as of: June 26, 2019Content Version: 12.4 © 7184-5210 Healthwise, Incorporated. Care instructions adapted under license by [employer] (which disclaims liability or warranty for this information). If you have questions about a medical condition or this instruction, always ask your healthcare professional. [address] any warranty or liability for your use of this information. Fibromyalgia: Care Instructions Your Care Instructions Fibromyalgia is a painful condition that is not completely understood by medical experts. The cause of fibromyalgia is not known. It can make you feel tired and ache all over. It causes tender spots at specific points of the body that hurt only when you press on them. You may have trouble sleeping, as well as other symptoms. These problems can upset your work and home life. Symptoms tend to come and go, although they may never go away completely. Fibromyalgia does not harm your muscles, joints, or organs. Follow-up care is a key part of your treatment and safety. Be sure to make and go to all appointments, and call your doctor if you are having problems. It's also a good idea to know your test results and keep a list of the medicines you take. How can you care for yourself at home? · Exercise often. Walk, swim, or bike to help with pain and sleep problems and to make you feel better. · Try to get a good night's sleep. Go to bed and get up at the same time each day, whether you feel rested or not. Make sure you have a good mattress and pillow. · Reduce stress. Avoid things that cause you stress, if you can. If not, work at making them less stressful. Learn to use biofeedback, guided imagery, meditation, or other methods to relax. · Make healthy changes. Eat a balanced diet, quit smoking, and limit alcohol and caffeine. · Use a heating pad set on low or take warm baths or showers for pain. Using cold packs for up to 20 minutes at a time can also relieve pain. Put a thin cloth between the cold pack and your skin. A gentle massage might help too. · Be safe with medicines. Take your medicines exactly as prescribed. Call your doctor if you think you are having a problem with your medicine. Your doctor may talk to you about taking antidepressant medicines. These medicines may improve sleep, relieve pain, and in some cases treat depression. · Learn about fibromyalgia. This makes coping easier.  Then, take an active role in your treatment. · Think about joining a support group with others who have fibromyalgia to learn more and get support. When should you call for help? Watch closely for changes in your health, and be sure to contact your doctor if: 
  · You feel sad, helpless, or hopeless; lose interest in things you used to enjoy; or have other symptoms of depression.  
  · Your fibromyalgia symptoms get worse. Where can you learn more? Go to http://kosta-michelle.info/ Enter V003 in the search box to learn more about \"Fibromyalgia: Care Instructions. \" Current as of: November 19, 2019Content Version: 12.4 © 7375-3363 Healthwise, Incorporated. Care instructions adapted under license by [employer] (which disclaims liability or warranty for this information). If you have questions about a medical condition or this instruction, always ask your healthcare professional. [address] any warranty or liability for your use of this information.

## 2020-03-16 NOTE — PROGRESS NOTES
HISTORY OF PRESENT ILLNESS  Heather Fallon is a 54 y.o. female. HPI: Here for follow-up. Last visit she had bilateral knee pain. X-ray showed arthritic changes. She has seen by Ortho. For now symptomatic treatment. Today the knee discomfort has been fairly stable. Denies any significant trouble with ambulation. She has a history of fibromyalgia. On the Cymbalta. Helping some. Has a history of depression. Also on Zoloft and trazodone for sleep. Discussed the patient to talk to her psychiatrist regarding multiple SSRI use. She will discuss it further and follow the recommendations and if needed taper Zoloft. She denies any mood changes. No thoughts of hurting herself or others. Today also complaining of lower back pain. Said it mild to moderate intensity. Increased with certain movements. No loss of urine or bowel control. No new lower extremity weakness, tingling or numbness. Not using any support for ambulation. Denies any gait changes. Taking the symptomatic treatment over-the-counter NSAIDs. Helping some. Sitting in the office without any acute distress. Visit Vitals  /68 (BP 1 Location: Left arm, BP Patient Position: Sitting)   Pulse 72   Temp 98.1 °F (36.7 °C) (Oral)   Resp 16   Ht 5' 5\" (1.651 m)   Wt 198 lb 9.6 oz (90.1 kg)   SpO2 97%   BMI 33.05 kg/m²     Review medication list, vitals, problem list,allergies. Review labs.   Lab Results   Component Value Date/Time    WBC 8.1 05/21/2019 04:44 PM    HGB 11.3 (L) 05/21/2019 04:44 PM    HCT 35.3 05/21/2019 04:44 PM    PLATELET 780 46/34/7119 04:44 PM    MCV 87.6 05/21/2019 04:44 PM     Lab Results   Component Value Date/Time    Sodium 142 05/21/2019 04:44 PM    Potassium 3.6 05/21/2019 04:44 PM    Chloride 105 05/21/2019 04:44 PM    CO2 31 05/21/2019 04:44 PM    Anion gap 6 05/21/2019 04:44 PM    Glucose 77 05/21/2019 04:44 PM    BUN 12 05/21/2019 04:44 PM    Creatinine 1.02 05/21/2019 04:44 PM    BUN/Creatinine ratio 12 05/21/2019 04:44 PM    GFR est AA >60 05/21/2019 04:44 PM    GFR est non-AA 58 (L) 05/21/2019 04:44 PM    Calcium 8.8 05/21/2019 04:44 PM    Bilirubin, total 0.2 05/21/2019 04:44 PM    AST (SGOT) 17 05/21/2019 04:44 PM    Alk. phosphatase 93 05/21/2019 04:44 PM    Protein, total 7.3 05/21/2019 04:44 PM    Albumin 3.6 05/21/2019 04:44 PM    Globulin 3.7 05/21/2019 04:44 PM    A-G Ratio 1.0 05/21/2019 04:44 PM    ALT (SGPT) 27 05/21/2019 04:44 PM     Lab Results   Component Value Date/Time    Cholesterol, total 176 12/07/2018 12:00 AM    HDL Cholesterol 59 12/07/2018 12:00 AM    LDL, calculated 97 12/07/2018 12:00 AM    VLDL, calculated 20 12/07/2018 12:00 AM    Triglyceride 99 12/07/2018 12:00 AM     Lab Results   Component Value Date/Time    TSH 1.050 08/02/2019 12:00 AM     Lab Results   Component Value Date/Time    Hemoglobin A1c 5.3 12/07/2018 12:00 AM         Lab Results   Component Value Date/Time    Vitamin B12 506 04/10/2019 11:20 AM   History of multinodular goiter. She was sent to the endocrinologist.  We will be obtaining the records. Denies any trouble swallowing or change in voice. Has a chronic fatigue but could be from multiple cause. XR Results (most recent):  Results from Hospital Encounter encounter on 01/16/20   XR KNEE LT MAX 2 VWS    Narrative EXAM: Left Knee X-ray    INDICATION: left knee pain    TECHNIQUE: AP and lateral views of the left knee obtained. COMPARISON: None. FINDINGS: The bone density is grossly unremarkable. Small joint effusion is  present. The alignment of the knee is unremarkable. No evidence of acute  fracture or dislocation. Prominent tibial apophysis enthesophyte is noted. Mild  medial compartment joint space loss and osteophyte formation are noted. There is  no evidence of erosions or periostitis. No lytic or blastic focus appreciated. The soft tissues are unremarkable. Impression IMPRESSION:  1. No evidence of acute fracture or dislocation.     2.  Mild medial compartment osteoarthritis. EXAM: Right Knee X-ray     INDICATION: rt knee pain     TECHNIQUE: 3 views of the right knee obtained.     COMPARISON: None.     FINDINGS: No joint effusion appreciated. The alignment of the knee is  unremarkable. No evidence of acute fracture or dislocation. Mild medial  compartment joint space loss is noted. There is no evidence of erosions or  periostitis. No lytic or blastic focus appreciated. The soft tissues are  unremarkable.     IMPRESSION  IMPRESSION:  1. Mild medial compartment joint space loss. ROS: See HPI    Physical Exam  Constitutional:       General: She is not in acute distress. Neck:      Musculoskeletal: Neck supple. Cardiovascular:      Rate and Rhythm: Normal rate and regular rhythm. Heart sounds: Normal heart sounds. Abdominal:      General: Bowel sounds are normal.      Palpations: Abdomen is soft. Tenderness: There is no abdominal tenderness. Lymphadenopathy:      Cervical: No cervical adenopathy. Neurological:      Mental Status: She is oriented to person, place, and time. Psychiatric:         Behavior: Behavior normal.         ASSESSMENT and PLAN    ICD-10-CM ICD-9-CM    1. Fibromyalgia: For now continue symptomatic treatment. M79.7 729.1    2. Chronic fatigue R53.82 780.79 DULoxetine (CYMBALTA) 60 mg capsule      naproxen (NAPROSYN) 500 mg tablet   3. Pain of multiple sites R52 780.96 DULoxetine (CYMBALTA) 60 mg capsule   4. Other depression: She is following psychiatrist.  Discussed the patient that talk to the specialist regarding multiple SSRI use. And will be following the recommendations. F32.89 311 DULoxetine (CYMBALTA) 60 mg capsule   5. Multiple joint pain M25.50 719.49 naproxen (NAPROSYN) 500 mg tablet   6. Stiffness in joint M25.60 719.50 naproxen (NAPROSYN) 500 mg tablet   7. Abnormal mammogram: Repeat mammogram in December within normal limits recommended yearly follow-up. R92.8 793.80     recent repeat was wnl. 8. Pain in both knees, unspecified chronicity: X-ray showed arthritic changes. She has seen Ortho. For now symptomatic treatment. M25.561 719.46     M25.562     9. Discomfort of back: For now obtaining spine x-ray. Symptomatic treatment with a heating pad. NSAIDs as needed with the food  alternate with Tylenol. She refused to physical therapy at this time. Will follow her up in couple of months. Advised her to call the office if symptom gets worse and we will do the referral to the physical therapy. M54.9 724.5 XR SPINE LUMB 2 OR 3 V   10. Multinodular goiter: We will obtain the notes from endocrinology. Asymptomatic at this time. E04.2 241.1      Patient understood and agree with above plan. Review health maintenance  Follow-up and Dispositions    · Return in about 2 months (around 5/16/2020).

## 2020-03-16 NOTE — PROGRESS NOTES
1. Have you been to the ER, urgent care clinic since your last visit? Hospitalized since your last visit? [facility] 2/02/2020    2. Have you seen or consulted any other health care providers outside of the [facility] since your last visit? Include any pap smears or colon screening. Dr. Jay Loza (rheumatology) LOV: 2/17/2020 and has a follow-up with her on 3/16/2020.     Chief Complaint   Patient presents with    Sleep Apnea    Fatigue    Fibromyalgia    Thyroid Problem     low TSH    Anemia    Depression    Joint Pain     mulitple joint pain    Abnormal Lab Results     abnormal mammogram    Back Pain     requests imaging

## 2020-05-11 ENCOUNTER — DOCUMENTATION ONLY (OUTPATIENT)
Dept: ORTHOPEDIC SURGERY | Age: 50
End: 2020-05-11

## 2020-05-18 ENCOUNTER — VIRTUAL VISIT (OUTPATIENT)
Dept: FAMILY MEDICINE CLINIC | Age: 50
End: 2020-05-18

## 2020-05-18 DIAGNOSIS — R19.5 HARD STOOL: ICD-10-CM

## 2020-05-18 DIAGNOSIS — K62.89 RECTAL PAIN: Primary | ICD-10-CM

## 2020-05-18 NOTE — PATIENT INSTRUCTIONS
High-Fiber Diet: Care Instructions Your Care Instructions A high-fiber diet may help you relieve constipation and feel less bloated. Your doctor and dietitian will help you make a high-fiber eating plan based on your personal needs. The plan will include the things you like to eat. It will also make sure that you get 30 grams of fiber a day. Before you make changes to the way you eat, be sure to talk with your doctor or dietitian. Follow-up care is a key part of your treatment and safety. Be sure to make and go to all appointments, and call your doctor if you are having problems. It's also a good idea to know your test results and keep a list of the medicines you take. How can you care for yourself at home? · You can increase how much fiber you get if you eat more of certain foods. These foods include: 
? Whole-grain breads and cereals. ? Fruits, such as pears, apples, and peaches. Eat the skins, peels, and seeds, if you can. 
? Vegetables, such as broccoli, cabbage, spinach, carrots, asparagus, and squash. ? Starchy vegetables. These include potatoes with skins, kidney beans, and lima beans. · Take a fiber supplement every day if your doctor recommends it. Examples are Benefiber, Citrucel, FiberCon, and Metamucil. Ask your doctor how much to take. · Drink plenty of fluids, enough so that your urine is light yellow or clear like water. If you have kidney, heart, or liver disease and have to limit fluids, talk with your doctor before you increase the amount of fluids you drink. · Get some exercise every day. Exercise helps stool move through the colon. It also helps prevent constipation. · Keep a food diary. Try to notice and write down what foods cause gas, pain, or other symptoms. Then you can avoid these foods. Where can you learn more? Go to http://kosta-micehlle.info/ Enter M755 in the search box to learn more about \"High-Fiber Diet: Care Instructions. \" 
 Current as of: August 21, 2019Content Version: 12.4 © 1711-0982 [employer]. Care instructions adapted under license by [employer] (which disclaims liability or warranty for this information). If you have questions about a medical condition or this instruction, always ask your healthcare professional. [address] any warranty or liability for your use of this information.

## 2020-05-18 NOTE — PROGRESS NOTES
Desi Aguila is a 52 y.o. female who was seen by synchronous (real-time) audio-video technology on 5/18/2020. Consent: Desi Aguila, who was seen by synchronous (real-time) audio-video technology, and/or her healthcare decision maker, is aware that this patient-initiated, Telehealth encounter on 5/18/2020 is a billable service, with coverage as determined by her insurance carrier. She is aware that she may receive a bill and has provided verbal consent to proceed: Yes. Assessment & Plan:   Diagnoses and all orders for this visit:    Rectal pain: Symptoms started 2 days ago with a hard bowel movement. No blood in bowel movement. Has been applying aqua for and it is helping little bit. For now advised symptomatic treatment with Preparation H and Metamucil over-the-counter to help with the hard stool. Also advised the sitz bath. Follow-up in 2 weeks or sooner if needed. Hard stool: See above    Patient understood and agree with above plan. Follow-up in 2 weeks or sooner if needed    Pa patient understood and agree with above plan tient understood and agree with above plan. Subjective:   Desi Aguila is a 52 y.o. female who was seen for Anal Pain  Done visit with [facility]. Complaint of having rectal pain since couple of days after hard bowel movement. She felt after bowel movement she did something and been feeling pain. On and off. Mainly with bowel movement. No blood in stool. Been having bowel movement every day but said little hard. Not taking anything for constipation. No nausea vomiting or abdominal pain. No fever. Sitting comfortable during video visit and did not appear in any acute distress. No other concern. Prior to Admission medications    Medication Sig Start Date End Date Taking? Authorizing Provider   DULoxetine (CYMBALTA) 60 mg capsule Take 1 Cap by mouth two (2) times a day.  3/16/20  Yes Emilia Mcmanus MD   naproxen (NAPROSYN) 500 mg tablet Take 1 Tab by mouth two (2) times daily as needed (pain). 3/16/20  Yes Melissa Magaña MD   sertraline (ZOLOFT) 50 mg tablet  1/15/20  Yes Provider, Historical   traZODone (DESYREL) 100 mg tablet  1/15/20  Yes Provider, Historical   meloxicam (MOBIC) 7.5 mg tablet Take 7.5 mg by mouth daily. Yes Provider, Historical     No Known Allergies      ROS: See HPI      Objective:     Visit Vitals  LMP 01/15/2016      General: alert, cooperative, no distress   Mental  status: normal mood, behavior, speech, dress, motor activity, and thought processes, able to follow commands           Resp: no respiratory distress   Neuro: no gross deficits       Psychiatric: normal affect, consistent with stated mood, no evidence of hallucinations     Additional exam findings: We discussed the expected course, resolution and complications of the diagnosis(es) in detail. Medication risks, benefits, costs, interactions, and alternatives were discussed as indicated. I advised her to contact the office if her condition worsens, changes or fails to improve as anticipated. She expressed understanding with the diagnosis(es) and plan. Luís Howell is a 52 y.o. female who was evaluated by a video visit encounter for concerns as above. Patient identification was verified prior to start of the visit. A caregiver was present when appropriate. Due to this being a TeleHealth encounter (During ROU-16 public health emergency), evaluation of the following organ systems was limited: Vitals/Constitutional/EENT/Resp/CV/GI//MS/Neuro/Skin/Heme-Lymph-Imm. Pursuant to the emergency declaration under the [facility], 1135 waiver authority and the [employer] and [employer] Act, this Virtual  Visit was conducted, with patient's (and/or legal guardian's) consent, to reduce the patient's risk of exposure to COVID-19 and provide necessary medical care. Services were provided through a video synchronous discussion virtually to substitute for in-person clinic visit. Patient and provider were located at their individual homes.       Jose Luis Arambula MD

## 2020-06-05 ENCOUNTER — HOSPITAL ENCOUNTER (OUTPATIENT)
Dept: LAB | Age: 50
Discharge: HOME | End: 2020-06-05

## 2020-06-05 LAB — XX-LABCORP SPECIMEN COL,LCBCF: NORMAL

## 2020-06-05 PROCEDURE — 99001 SPECIMEN HANDLING PT-LAB: CPT

## 2020-06-30 DIAGNOSIS — M13.0 POLYARTHRITIS: ICD-10-CM

## 2020-06-30 DIAGNOSIS — M79.7 FIBROMYALGIA: Primary | ICD-10-CM

## 2020-07-06 ENCOUNTER — TELEPHONE (OUTPATIENT)
Dept: FAMILY MEDICINE CLINIC | Age: 50
End: 2020-07-06

## 2020-07-06 NOTE — TELEPHONE ENCOUNTER
Have you been diagnosed with, tested for, or told that you are suspected of having COVID-19 (coronovirus)? No  Have you had a fever or taken medication to treat a fever in the past 72 hours? No  Have you had a cough, SOB, or flu-like symptoms within the past 3 days? No  Have you had direct contact with someone who tested positive for COVID-19 within the past 14 days? No  Do you have a household member with flu-like symptoms, including fever, cough, or SOB? No  Do you currently have flu-like symptoms including fever, cough, or SOB?  No

## 2020-07-07 ENCOUNTER — OFFICE VISIT (OUTPATIENT)
Dept: FAMILY MEDICINE CLINIC | Age: 50
End: 2020-07-07

## 2020-07-07 ENCOUNTER — HOSPITAL ENCOUNTER (OUTPATIENT)
Dept: LAB | Age: 50
Discharge: HOME | End: 2020-07-07

## 2020-07-07 VITALS
BODY MASS INDEX: 32.99 KG/M2 | HEART RATE: 75 BPM | TEMPERATURE: 98 F | RESPIRATION RATE: 16 BRPM | SYSTOLIC BLOOD PRESSURE: 120 MMHG | HEIGHT: 65 IN | OXYGEN SATURATION: 99 % | DIASTOLIC BLOOD PRESSURE: 70 MMHG | WEIGHT: 198 LBS

## 2020-07-07 DIAGNOSIS — N89.8 VAGINAL ITCHING: ICD-10-CM

## 2020-07-07 DIAGNOSIS — R30.0 DYSURIA: ICD-10-CM

## 2020-07-07 DIAGNOSIS — K62.89 RECTAL PAIN: ICD-10-CM

## 2020-07-07 DIAGNOSIS — R10.2 VAGINAL PAIN: Primary | ICD-10-CM

## 2020-07-07 DIAGNOSIS — G47.9 SLEEP TROUBLE: ICD-10-CM

## 2020-07-07 DIAGNOSIS — R52 PAIN OF MULTIPLE SITES: ICD-10-CM

## 2020-07-07 DIAGNOSIS — R10.2 VAGINAL PAIN: ICD-10-CM

## 2020-07-07 DIAGNOSIS — F32.89 OTHER DEPRESSION: ICD-10-CM

## 2020-07-07 DIAGNOSIS — M79.7 FIBROMYALGIA: ICD-10-CM

## 2020-07-07 DIAGNOSIS — E04.2 MULTINODULAR GOITER: ICD-10-CM

## 2020-07-07 LAB
BILIRUB UR QL STRIP: NORMAL
GLUCOSE UR-MCNC: NEGATIVE MG/DL
KETONES P FAST UR STRIP-MCNC: NEGATIVE MG/DL
PH UR STRIP: 5.5 [PH] (ref 4.6–8)
PROT UR QL STRIP: NORMAL
SP GR UR STRIP: 1.03 (ref 1–1.03)
UA UROBILINOGEN AMB POC: NORMAL (ref 0.2–1)
URINALYSIS CLARITY POC: NORMAL
URINALYSIS COLOR POC: NORMAL
URINE BLOOD POC: NORMAL
URINE LEUKOCYTES POC: NORMAL
URINE NITRITES POC: NEGATIVE
XX-LABCORP SPECIMEN COL,LCBCF: NORMAL

## 2020-07-07 PROCEDURE — 99001 SPECIMEN HANDLING PT-LAB: CPT

## 2020-07-07 RX ORDER — SULFAMETHOXAZOLE AND TRIMETHOPRIM 800; 160 MG/1; MG/1
1 TABLET ORAL 2 TIMES DAILY
Qty: 6 TAB | Refills: 0 | Status: SHIPPED | OUTPATIENT
Start: 2020-07-07 | End: 2020-07-10

## 2020-07-07 RX ORDER — MELOXICAM 15 MG/1
15 TABLET ORAL DAILY
COMMUNITY
Start: 2020-06-01 | End: 2020-12-02

## 2020-07-07 RX ORDER — ERGOCALCIFEROL 1.25 MG/1
1.25 CAPSULE ORAL
COMMUNITY
Start: 2020-06-09 | End: 2020-08-22

## 2020-07-07 NOTE — PROGRESS NOTES
1. Have you been to the ER, urgent care clinic since your last visit? Hospitalized since your last visit? No    2. Have you seen or consulted any other health care providers outside of the [address] [place] [address] since your last visit? Include any pap smears or colon screening. No       Have you been diagnosed with, tested for, or told that you are suspected of having COVID-19 (coronovirus)? No  Have you had a fever or taken medication to treat a fever in the past 72 hours? No  Have you had a cough, SOB, or flu-like symptoms within the past 3 days? No  Have you had direct contact with someone who tested positive for COVID-19 within the past 14 days? No  Do you have a household member with flu-like symptoms, including fever, cough, or SOB? No  Do you currently have flu-like symptoms including fever, cough, or SOB?  No

## 2020-07-07 NOTE — PATIENT INSTRUCTIONS
High-Fiber Diet: Care Instructions Your Care Instructions A high-fiber diet may help you relieve constipation and feel less bloated. Your doctor and dietitian will help you make a high-fiber eating plan based on your personal needs. The plan will include the things you like to eat. It will also make sure that you get 30 grams of fiber a day. Before you make changes to the way you eat, be sure to talk with your doctor or dietitian. Follow-up care is a key part of your treatment and safety. Be sure to make and go to all appointments, and call your doctor if you are having problems. It's also a good idea to know your test results and keep a list of the medicines you take. How can you care for yourself at home? · You can increase how much fiber you get if you eat more of certain foods. These foods include: 
? Whole-grain breads and cereals. ? Fruits, such as pears, apples, and peaches. Eat the skins, peels, and seeds, if you can. 
? Vegetables, such as broccoli, cabbage, spinach, carrots, asparagus, and squash. ? Starchy vegetables. These include potatoes with skins, kidney beans, and lima beans. · Take a fiber supplement every day if your doctor recommends it. Examples are Benefiber, Citrucel, FiberCon, and Metamucil. Ask your doctor how much to take. · Drink plenty of fluids, enough so that your urine is light yellow or clear like water. If you have kidney, heart, or liver disease and have to limit fluids, talk with your doctor before you increase the amount of fluids you drink. · Get some exercise every day. Exercise helps stool move through the colon. It also helps prevent constipation. · Keep a food diary. Try to notice and write down what foods cause gas, pain, or other symptoms. Then you can avoid these foods. Where can you learn more? Go to http://www.gray.com/ Enter C508 in the search box to learn more about \"High-Fiber Diet: Care Instructions. \" 
 Current as of: August 22, 2019               Content Version: 12.5 © 3455-5433 [employer]. Care instructions adapted under license by [employer] (which disclaims liability or warranty for this information). If you have questions about a medical condition or this instruction, always ask your healthcare professional. [address] any warranty or liability for your use of this information. Fibromyalgia: Care Instructions Overview Fibromyalgia is a painful condition that is not completely understood by medical experts. The cause of fibromyalgia is not known. It can make you feel tired and ache all over. It causes tender spots at specific points of the body that hurt only when you press on them. You may have trouble sleeping, as well as other symptoms. These problems can upset your work and home life. Symptoms tend to come and go, although they may never go away completely. Fibromyalgia does not harm your muscles, joints, or organs. Follow-up care is a key part of your treatment and safety. Be sure to make and go to all appointments, and call your doctor if you are having problems. It's also a good idea to know your test results and keep a list of the medicines you take. How can you care for yourself at home? · Exercise often. Walk, swim, or bike to help with pain and sleep problems and to make you feel better. · Try to get a good night's sleep. Go to bed and get up at the same time each day, whether you feel rested or not. Make sure you have a good mattress and pillow. · Reduce stress. Avoid things that cause you stress, if you can. If not, work at making them less stressful. Learn to use biofeedback, guided imagery, meditation, or other methods to relax. · Make healthy changes. Eat a balanced diet, quit smoking, and limit alcohol and caffeine. · Use a heating pad set on low or take warm baths or showers for pain. Using cold packs for up to 20 minutes at a time can also relieve pain. Put a thin cloth between the cold pack and your skin. A gentle massage might help too. · Be safe with medicines. Take your medicines exactly as prescribed. Call your doctor if you think you are having a problem with your medicine. Your doctor may talk to you about taking antidepressant medicines. These medicines may improve sleep, relieve pain, and in some cases treat depression. · Learn about fibromyalgia. This makes coping easier. Then, take an active role in your treatment. · Think about joining a support group with others who have fibromyalgia to learn more and get support. When should you call for help? Watch closely for changes in your health, and be sure to contact your doctor if: 
· You feel sad, helpless, or hopeless; lose interest in things you used to enjoy; or have other symptoms of depression. · Your fibromyalgia symptoms get worse. Where can you learn more? Go to http://www.gray.com/ Enter V003 in the search box to learn more about \"Fibromyalgia: Care Instructions. \" Current as of: November 20, 2019               Content Version: 12.5 © 9743-1914 [employer]wise, Incorporated. Care instructions adapted under license by [employer] (which disclaims liability or warranty for this information). If you have questions about a medical condition or this instruction, always ask your healthcare professional. [address] any warranty or liability for your use of this information.

## 2020-07-07 NOTE — PROGRESS NOTES
HISTORY OF PRESENT ILLNESS  Slick Chamberlain is a 52 y.o. female. HPI: Here with few concern. Complaining of vaginal itching and discomfort since few days. Also feeling burning urination along with it. No blood in the urine or bowel movement. No leg or abdominal pain. No fever. No back pain. She is sexually active. No prior history of STD. She has hysterectomy. UA at the office showed positive leukocyte esterase and +3 blood. Presence of protein. She was sitting without any acute distress during the office visit. History of multiple side pain and fibromyalgia. Currently fairly stable. She is following Dr. Jose Guadalupe Bagley. No joint swelling. History of goiter. No trouble swallowing or change in voice. No weight or appetite changes. No heat or cold intolerance. No skin changes. She been following Endo. Reviewed their notes and no new concern or recommendation. History of depression. She is following psychiatrist.  On 3 SSRIs. Discussed before as well to taper the dose on Zoloft. She is going to work with her psychiatrist.  Her psychiatrist is aware and she has an appointment next week. No side effects of the medication at this time. Trouble sleep has been stable on trazodone. Again no side effects of the medication. No headache or dizziness. No chest pain or shortness of breath. No nausea or vomiting. No abdominal pain. No mood changes. Visit Vitals  /70 (BP 1 Location: Right arm, BP Patient Position: Sitting)   Pulse 75   Temp 98 °F (36.7 °C) (Oral)   Resp 16   Ht 5' 5\" (1.651 m)   Wt 198 lb (89.8 kg)   SpO2 99%   BMI 32.95 kg/m²     Review medication list, vitals, problem list,allergies.    Results for orders placed or performed in visit on 07/07/20   AMB POC URINALYSIS DIP STICK AUTO W/ MICRO     Status: None   Result Value Ref Range Status    Color (UA POC) Dark Yellow  Final    Clarity (UA POC) Slightly Cloudy  Final    Glucose (UA POC) Negative Negative Final    Bilirubin (UA POC) 1+ Negative Final    Ketones (UA POC) Negative Negative Final    Specific gravity (UA POC) 1.030 1.001 - 1.035 Final     Comment: >=    Blood (UA POC) 3+ Negative Final    pH (UA POC) 5.5 4.6 - 8.0 Final    Protein (UA POC) 1+ Negative Final    Urobilinogen (UA POC) 0.2 mg/dL 0.2 - 1 Final    Nitrites (UA POC) Negative Negative Final    Leukocyte esterase (UA POC) 1+ Negative Final     ROS: See HPI    Physical Exam  Abdominal:      Palpations: Abdomen is soft. Tenderness: There is no abdominal tenderness. Genitourinary:     Comments: Normal vagina. Mild whitish clear discharge. Absence of cervix and uterus. No genital rash  Musculoskeletal:         General: No swelling. Neurological:      Mental Status: She is alert and oriented to person, place, and time. Psychiatric:         Behavior: Behavior normal.         ASSESSMENT and PLAN    ICD-10-CM ICD-9-CM    1. Vaginal pain: Vaginal itching and dysuria. For now sending urine for culture as positive blood and leukocyte esterase in the urine analysis. Given prescription for Bactrim. She is willing to wait till culture results back. Sending a new swab as well. R10.2 625.9 AMB POC URINALYSIS DIP STICK AUTO W/ MICRO      NUSWAB VAGINITIS PLUS   2. Pain of multiple sites: Fairly stable at this time. Following rheumatologist Dr. Red Vega. R52 780.96    3. Rectal pain: Has been improved at this time. Constipation has improved as well. K62.89 569.42    4. Fibromyalgia: Fairly stable on current symptomatic treatment M79.7 729.1    5. Other depression: Following psychiatrist.  Will work on a tapering Zoloft as she is on 3 SSRIs at this time. F32.89 311    6. Multinodular goiter: Following Endo. No new recommendation. She is asymptomatic. Will observe and follow specialist recommendation E04.2 241.1    7. Sleep trouble: Following sleep hygiene. Also improved on trazodone G47.9 780.50    8. Dysuria: Sending urine culture. See above R30.0 788. 1 CULTURE, URINE      trimethoprim-sulfamethoxazole (BACTRIM DS, SEPTRA DS) 160-800 mg per tablet      CANCELED: CULTURE, URINE   9. Vaginal itching N89.8 698.1 NUSWAB VAGINITIS PLUS   Patient understood and agree with above plan. Follow-up and Dispositions    · Return in about 2 weeks (around 7/21/2020), or if symptoms worsen or fail to improve, for otherwise routine 3 months f/u .

## 2020-07-13 RX ORDER — FLUCONAZOLE 150 MG/1
150 TABLET ORAL DAILY
Qty: 1 TAB | Refills: 0 | Status: SHIPPED | OUTPATIENT
Start: 2020-07-13 | End: 2020-07-14

## 2020-07-14 ENCOUNTER — TELEPHONE (OUTPATIENT)
Dept: FAMILY MEDICINE CLINIC | Age: 50
End: 2020-07-14

## 2020-07-14 DIAGNOSIS — N76.0 BV (BACTERIAL VAGINOSIS): Primary | ICD-10-CM

## 2020-07-14 DIAGNOSIS — B96.89 BV (BACTERIAL VAGINOSIS): Primary | ICD-10-CM

## 2020-07-14 RX ORDER — METRONIDAZOLE 500 MG/1
500 TABLET ORAL 2 TIMES DAILY
Qty: 14 TAB | Refills: 0 | Status: SHIPPED | OUTPATIENT
Start: 2020-07-14 | End: 2020-07-21

## 2020-07-14 NOTE — TELEPHONE ENCOUNTER
Place call to [phone] [facility], ([phone]) Account # [de-identified] spoke with a Hung to follow up on nuswab/urine culture.  Per Hung at [facility] is pending chlamydia results

## 2020-07-14 NOTE — TELEPHONE ENCOUNTER
Spoke with Giselle Herzog to inform her that her results were positive for BV and trichomonas. Dr. Ed Hassan has e-scribed metronidazole and advised sexual partner get treated as well.

## 2020-07-15 LAB
A VAGINAE DNA VAG QL NAA+PROBE: ABNORMAL SCORE
BACTERIA UR CULT: NORMAL
BVAB2 DNA VAG QL NAA+PROBE: ABNORMAL SCORE
C ALBICANS DNA VAG QL NAA+PROBE: NEGATIVE
C GLABRATA DNA VAG QL NAA+PROBE: NEGATIVE
C TRACH DNA VAG QL NAA+PROBE: NEGATIVE
MEGA1 DNA VAG QL NAA+PROBE: ABNORMAL SCORE
N GONORRHOEA DNA VAG QL NAA+PROBE: NEGATIVE
T VAGINALIS DNA VAG QL NAA+PROBE: POSITIVE

## 2020-07-15 NOTE — PROGRESS NOTES
Given metronidazole for BV and also trichomonas positive. For now advise to get treated all sexual partners.

## 2020-07-15 NOTE — PROGRESS NOTES
See telephone encounter from 07- Renae Tejada has already been notified of results and only has one sexual partner: [phone] (home) 2 patient identifiers verified with Renae Tejada to inform her that her results were positive for BV and Dr. Thom Corbett has e-scribed metronidazole.

## 2020-07-28 ENCOUNTER — VIRTUAL VISIT (OUTPATIENT)
Dept: FAMILY MEDICINE CLINIC | Age: 50
End: 2020-07-28

## 2020-07-28 DIAGNOSIS — R10.2 VAGINAL PAIN: Primary | ICD-10-CM

## 2020-07-28 DIAGNOSIS — R30.0 DYSURIA: ICD-10-CM

## 2020-07-28 DIAGNOSIS — R31.29 MICROSCOPIC HEMATURIA: ICD-10-CM

## 2020-07-28 NOTE — PROGRESS NOTES
France Brand is a 52 y.o. female, evaluated via audio-only technology on 7/28/2020 for Follow-up  . Assessment & Plan:   Diagnoses and all orders for this visit:    Vaginal pain: Last visit here for vagina pain and urine discomfort. Done new swab. She was given Flagyl as it was positive for BV and trichomonas infection. She has inform the partner. Currently asymptomatic. Will observe for now. Dysuria: Noted blood in urine analysis. For now we will repeat the UA. Patient is aware that orders are in the chart    Microscopic hematuria  -     URINALYSIS W/ RFLX MICROSCOPIC; Future    Patient understood and agree with the plan. 12  Subjective:     Done visit with the phone check. She was on a telemedicine my chart and due to running behind she locked off and patient was approached after 20 minutes of schedule appointment. She was unable to get back on my chart so visit was completed by phone check. Last visit she was having vaginal discomfort pain and dysuria. She was checked for news swab and UA. No specific signs of UTI but will consider repeat UA as it showed microscopic hematuria. After new swab results she was given Flagyl as results showed positive for BV and trichomonas infection. She was informed the partner to be treated. She is feeling completely asymptomatic. No vagina discharge or pain or trouble urination at this time. No nausea vomiting or abdominal pain. No bowel complaints. Tolerated medication well. No other concern. No headache or dizziness. No chest pain or trouble breathing. No fever cold and cough  Prior to Admission medications    Medication Sig Start Date End Date Taking? Authorizing Provider   ergocalciferol (ERGOCALCIFEROL) 1,250 mcg (50,000 unit) capsule Take 1.25 Units by mouth. Monday, Wednesday and Friday 6/9/20  Yes Provider, Historical   DULoxetine (CYMBALTA) 60 mg capsule Take 1 Cap by mouth two (2) times a day.  3/16/20  Yes Carol Cheung MD sertraline (ZOLOFT) 50 mg tablet  1/15/20  Yes Provider, Historical   traZODone (DESYREL) 100 mg tablet  1/15/20  Yes Provider, Historical   meloxicam (MOBIC) 15 mg tablet Take 15 mg by mouth daily. 6/1/20   Provider, Historical   naproxen (NAPROSYN) 500 mg tablet Take 1 Tab by mouth two (2) times daily as needed (pain). 3/16/20 7/28/20  Anjum Kilpatrick MD         ROS: See HPI  Patient-Reported Vitals 7/28/2020   Patient-Reported Weight 198   Patient-Reported Height 5'4\"   Patient-Reported Pulse 78   Patient-Reported Temperature 98.6   Patient-Reported Systolic  968   Patient-Reported Diastolic 80   Patient-Reported LMP N/A        Fartun Vicente, who was evaluated through a patient-initiated, synchronous (real-time) audio only encounter, and/or her healthcare decision maker, is aware that it is a billable service, with coverage as determined by her insurance carrier. She provided verbal consent to proceed: Yes. She has not had a related appointment within my department in the past 7 days or scheduled within the next 24 hours.       Total Time: minutes: 5-10 minutes    Yohannes Serrano MD

## 2020-08-22 ENCOUNTER — HOSPITAL ENCOUNTER (EMERGENCY)
Age: 50
Discharge: HOME | End: 2020-08-22
Attending: EMERGENCY MEDICINE
Payer: MEDICAID

## 2020-08-22 VITALS
RESPIRATION RATE: 20 BRPM | WEIGHT: 175 LBS | DIASTOLIC BLOOD PRESSURE: 73 MMHG | HEIGHT: 66 IN | OXYGEN SATURATION: 99 % | TEMPERATURE: 98.7 F | BODY MASS INDEX: 28.12 KG/M2 | SYSTOLIC BLOOD PRESSURE: 160 MMHG | HEART RATE: 75 BPM

## 2020-08-22 DIAGNOSIS — K08.89 PAIN, DENTAL: Primary | ICD-10-CM

## 2020-08-22 PROCEDURE — 99282 EMERGENCY DEPT VISIT SF MDM: CPT

## 2020-08-22 RX ORDER — AMOXICILLIN 500 MG/1
500 TABLET, FILM COATED ORAL 3 TIMES DAILY
Qty: 30 TAB | Refills: 0 | Status: SHIPPED | OUTPATIENT
Start: 2020-08-22 | End: 2020-09-01

## 2020-08-22 RX ORDER — QUETIAPINE FUMARATE 100 MG/1
100 TABLET, FILM COATED ORAL DAILY
COMMUNITY
End: 2021-03-19

## 2020-08-22 RX ORDER — HYDROCODONE BITARTRATE AND ACETAMINOPHEN 5; 325 MG/1; MG/1
1 TABLET ORAL
Qty: 9 TAB | Refills: 0 | Status: SHIPPED | OUTPATIENT
Start: 2020-08-22 | End: 2020-08-25

## 2020-08-22 NOTE — DISCHARGE INSTRUCTIONS
Patient Education     Please follow-up with one of the dental clinics listed below:    [facility] [phone]  [facility] [phone]  [facility] [phone] (For Extractions Only)  [address] [facility] [phone]  [facility] [phone] [address] [phone]    Call to schedule an appointment. Periodontal Conditions: Care Instructions  Your Care Instructions    Periodontal conditions affect the gums, bone, and tissue that surround and support the teeth. The most common problems are caused by plaque. Plaque is a thin film of bacteria that sticks to teeth above and below the gum line. It can build up and harden into tartar. The bacteria in plaque and tartar can cause gum disease. Gingivitis is a disease that affects the gums (gingiva). The gums are the soft tissue that surrounds the teeth. Gingivitis causes red, swollen, tender gums that bleed easily when brushed, persistent bad breath, and sensitive teeth. Because it is not painful, many people do not get treatment when they should. Gingivitis can be reversed with good dental care. Periodontitis is a more advanced disease that affects more than the gums. The gums pull away from the teeth. This leaves deep pockets where bacteria can grow. The disease can damage the bones that support the teeth. The teeth may get loose and fall out. A periodontal condition should be treated as soon as it is found. Finding gum problems early, treating them right away, and having regular checkups bring the best results. You can treat mild periodontal conditions by brushing and flossing your teeth every day. Your dentist may prescribe a mouthwash to kill the bacteria that can damage teeth and gums. Your dentist may have you take antibiotics to treat infection from moderate periodontal disease.   If your gums have pulled away from your teeth, you may need cleaning between the teeth and gums right down to the teeth roots. This is called root planing and scaling. If you have severe periodontal disease, you may need surgery to remove diseased gum tissue or repair bone damage. Follow-up care is a key part of your treatment and safety. Be sure to make and go to all appointments, and call your dentist if you are having problems. It's also a good idea to know your test results and keep a list of the medicines you take. How can you care for yourself at home? · If your dentist prescribed antibiotics, take them as directed. Do not stop taking them just because you feel better. You need to take the full course of antibiotics. · Brush your teeth twice a day, in the morning and at night. ? Use a toothbrush with soft, rounded-end bristles and a head that is small enough to reach all parts of your teeth and mouth. Replace your toothbrush every 3 to 4 months. ? Use a fluoride toothpaste. ? Place the brush at a 45-degree angle where the teeth meet the gums. Press firmly, and gently rock the brush back and forth using small circular movements. ? Brush chewing surfaces vigorously with short back-and-forth strokes. ? Brush your tongue from back to front. · Floss at least once a day. Choose the type and flavor that you like best.  · Have your teeth cleaned by a professional at least twice a year. · Ask your dentist about using an antibacterial mouthwash to help reduce bacteria. · Rinse your mouth with water or chew sugar-free gum after meals if you can't brush your teeth. · Do not smoke or use smokeless tobacco. Tobacco use can cause periodontal disease. When should you call for help? Call your dentist now or seek immediate medical care if:  · You have symptoms of infection, such as:  ? Increased pain, swelling, warmth, or redness. ? Red streaks leading from the area. ? Pus draining from the area. ? A fever.   Watch closely for changes in your health, and be sure to contact your dentist if:  · You have new or worse tooth pain.  · You do not get better as expected. Where can you learn more? Go to http://kosta-michelle.info/  Enter R677 in the search box to learn more about \"Periodontal Conditions: Care Instructions. \"  Current as of: March 25, 2020               Content Version: 12.5  © 9627-0757 [employer]. Care instructions adapted under license by [employer] (which disclaims liability or warranty for this information). If you have questions about a medical condition or this instruction, always ask your healthcare professional. [address] any warranty or liability for your use of this information.

## 2020-08-22 NOTE — ED PROVIDER NOTES
EMERGENCY DEPARTMENT HISTORY AND PHYSICAL EXAM    2:26 PM      Date: 8/22/2020  Patient Name: Laron Mata    History of Presenting Illness     Chief Complaint   Patient presents with    Dental Pain       History Provided By: Patient    Chief Complaint: Left lower dental pain  Duration:  Days  Timing:  Acute  Location:   Quality: Aching  Severity: Severe  Modifying Factors: none  Associated Symptoms: denies any other associated signs or symptoms      Additional History (Context):Ashu Su is a 52 y.o. female who presents to the emergency department for evaluation of left lower dental pain for the past few days. Patient reports she bit into a daily stop and her tooth broke. She states she is having difficulty finding a dentist to accept Medicaid. She states she has had all of her upper teeth pulled and is getting ready to have all of her lower teeth pulled as well. No associated fever, chills, nausea, vomiting, diarrhea, chest pain, shortness of breath, difficulty swallowing, possibility of pregnancy, or any other complaints. PCP:  Jayson Moss MD      Current Outpatient Medications   Medication Sig Dispense Refill    QUEtiapine (SEROqueL) 100 mg tablet Take 100 mg by mouth two (2) times a day.  amoxicillin 500 mg tab Take 500 mg by mouth three (3) times daily for 10 days. 30 Tab 0    HYDROcodone-acetaminophen (Norco) 5-325 mg per tablet Take 1 Tab by mouth every eight (8) hours as needed for Pain for up to 3 days. Max Daily Amount: 3 Tabs. 9 Tab 0    meloxicam (MOBIC) 15 mg tablet Take 15 mg by mouth daily.          Past History     Past Medical History:  Past Medical History:   Diagnosis Date    Anemia     Arthritis     Fibroids     uterine fibroids    Psychiatric disorder     anxiety       Past Surgical History:  Past Surgical History:   Procedure Laterality Date    HX HYSTERECTOMY      HX OTHER SURGICAL      surgery for punctured intestine    HX OVARIAN CYST REMOVAL Bilateral     HX TUBAL LIGATION         Family History:  Family History   Problem Relation Age of Onset    Hypertension Mother     Diabetes Mother     Breast Cancer Maternal Aunt        Social History:  Social History     Tobacco Use    Smoking status: Never Smoker    Smokeless tobacco: Never Used   Substance Use Topics    Alcohol use: No    Drug use: No       Allergies:  No Known Allergies      Review of Systems       Review of Systems   Constitutional: Negative for chills and fever. HENT: Positive for dental problem. Negative for congestion, rhinorrhea and sore throat. Respiratory: Negative for cough and shortness of breath. Cardiovascular: Negative for chest pain. Gastrointestinal: Negative for abdominal pain, blood in stool, constipation, diarrhea, nausea and vomiting. Genitourinary: Negative for dysuria, frequency and hematuria. Musculoskeletal: Negative for back pain and myalgias. Skin: Negative for rash and wound. Neurological: Negative for dizziness and headaches. All other systems reviewed and are negative. Physical Exam     Visit Vitals  /73 (BP 1 Location: Right arm)   Pulse 75   Temp 98.7 °F (37.1 °C)   Resp 20   Ht 5' 6\" (1.676 m)   Wt 79.4 kg (175 lb)   LMP 01/15/2016   SpO2 99%   BMI 28.25 kg/m²       Physical Exam  Vitals signs and nursing note reviewed. Constitutional:       General: She is not in acute distress. Appearance: She is well-developed. She is not diaphoretic. HENT:      Head: Normocephalic and atraumatic. Mouth/Throat:      Comments: Diffusely poor dentition. Tiny area of swelling noted adjacent to left lateral fractured premolar. No sublingual/submandibular induration, trismus, or stridor. Normal speech. Handling oral secretions without difficulty. Pt with full ROM of neck. Eyes:      Conjunctiva/sclera: Conjunctivae normal.   Neck:      Musculoskeletal: Normal range of motion and neck supple.    Cardiovascular:      Rate and Rhythm: Normal rate and regular rhythm. Heart sounds: Normal heart sounds. Pulmonary:      Effort: Pulmonary effort is normal. No respiratory distress. Breath sounds: Normal breath sounds. Chest:      Chest wall: No tenderness. Musculoskeletal:         General: No deformity. Skin:     General: Skin is warm and dry. Neurological:      Mental Status: She is alert and oriented to person, place, and time. Deep Tendon Reflexes: Reflexes are normal and symmetric. Diagnostic Study Results     Labs -  No results found for this or any previous visit (from the past 12 hour(s)). Radiologic Studies -   No results found. Medical Decision Making   I am the first provider for this patient. I reviewed the vital signs, available nursing notes, past medical history, past surgical history, family history and social history. Vital Signs-Reviewed the patient's vital signs. Pulse Oximetry Analysis -  99% on room air (Interpretation)    Records Reviewed: Nursing Notes and Old Medical Records (Time of Review: 2:26 PM)    ED Course: Progress Notes, Reevaluation, and Consults:    Provider Notes (Medical Decision Making):   Differential Diagnosis:  Dentalgia, dental caries, dental fracture, periodontal abscess/cellulitis, gingivitis, facial abscess/cellulitis    Plan:  Pt presents ambulatory in NAD, well-hydrated, non-toxic in appearance, with normal vitals. Tiny area of swelling noted adjacent to left lateral fractured premolar. No sublingual/submandibular induration, trismus, or stridor. Normal speech. Handling oral secretions without difficulty. Pt with full ROM of neck. Low suspicion for Donato's angina or deep space infection. Will DC home with pain meds and abx. Pt is strongly advised to follow-up with dentist in short period of time as they will need definitive management. At this time, patient is stable and appropriate for discharge home.   Patient demonstrates understanding of current diagnoses and is in agreement with the treatment plan. They are advised that while the likelihood of serious underlying condition is low at this point given the evaluation performed today, we cannot fully rule it out. They are advised to immediately return with any new symptoms or worsening of current condition. All questions have been answered. Patient is given educational material regarding their diagnoses, including danger symptoms and when to return to the ED. Diagnosis     Clinical Impression:   1. Pain, dental        Disposition: DC Home    Follow-up Information     Follow up With Specialties Details Why Contact Info    [address] [facility] EMERGENCY DEPT Emergency Medicine Go to As needed, If symptoms worsen [address] [facility]  [phone]           Discharge Medication List as of 8/22/2020  3:11 PM      START taking these medications    Details   amoxicillin 500 mg tab Take 500 mg by mouth three (3) times daily for 10 days. , Normal, Disp-30 Tab,R-0      HYDROcodone-acetaminophen (Norco) 5-325 mg per tablet Take 1 Tab by mouth every eight (8) hours as needed for Pain for up to 3 days. Max Daily Amount: 3 Tabs., Normal, Disp-9 Tab,R-0         CONTINUE these medications which have NOT CHANGED    Details   QUEtiapine (SEROqueL) 100 mg tablet Take 100 mg by mouth two (2) times a day., Historical Med      meloxicam (MOBIC) 15 mg tablet Take 15 mg by mouth daily. , Historical Med           _______________________________    This note was dictated utilizing voice recognition software which may lead to typographical errors. I apologize in advance if the situation occurs. If questions arise please do not hesitate to contact me or call our department.   Kavya Rudd PA-C

## 2020-09-08 DIAGNOSIS — R52 PAIN OF MULTIPLE SITES: ICD-10-CM

## 2020-09-08 DIAGNOSIS — R53.82 CHRONIC FATIGUE: ICD-10-CM

## 2020-09-08 DIAGNOSIS — F32.89 OTHER DEPRESSION: ICD-10-CM

## 2020-09-15 RX ORDER — DULOXETIN HYDROCHLORIDE 60 MG/1
CAPSULE, DELAYED RELEASE ORAL
Qty: 180 CAP | Refills: 1 | OUTPATIENT
Start: 2020-09-15

## 2020-09-15 NOTE — TELEPHONE ENCOUNTER
Contacted patient and verified identity using name and date of birth (2- identifiers)  Spoke with patient and she is not taking Cymbalta at this time

## 2020-09-18 ENCOUNTER — TELEPHONE (OUTPATIENT)
Dept: FAMILY MEDICINE CLINIC | Age: 50
End: 2020-09-18

## 2020-09-21 ENCOUNTER — OFFICE VISIT (OUTPATIENT)
Dept: FAMILY MEDICINE CLINIC | Age: 50
End: 2020-09-21

## 2020-09-21 ENCOUNTER — HOSPITAL ENCOUNTER (OUTPATIENT)
Dept: LAB | Age: 50
Discharge: HOME | End: 2020-09-21
Payer: MEDICAID

## 2020-09-21 ENCOUNTER — HOSPITAL ENCOUNTER (OUTPATIENT)
Dept: LAB | Age: 50
Discharge: HOME | End: 2020-09-21

## 2020-09-21 VITALS
WEIGHT: 203 LBS | OXYGEN SATURATION: 98 % | RESPIRATION RATE: 16 BRPM | TEMPERATURE: 98.8 F | HEART RATE: 75 BPM | SYSTOLIC BLOOD PRESSURE: 110 MMHG | DIASTOLIC BLOOD PRESSURE: 60 MMHG | BODY MASS INDEX: 32.77 KG/M2

## 2020-09-21 DIAGNOSIS — R00.2 PALPITATION: ICD-10-CM

## 2020-09-21 DIAGNOSIS — Z12.11 SCREENING FOR COLON CANCER: ICD-10-CM

## 2020-09-21 DIAGNOSIS — G47.09 TROUBLE GETTING TO SLEEP: ICD-10-CM

## 2020-09-21 DIAGNOSIS — Z23 ENCOUNTER FOR IMMUNIZATION: ICD-10-CM

## 2020-09-21 DIAGNOSIS — M25.50 MULTIPLE JOINT PAIN: ICD-10-CM

## 2020-09-21 DIAGNOSIS — E04.2 MULTINODULAR GOITER: ICD-10-CM

## 2020-09-21 DIAGNOSIS — Z80.0 FAMILY HISTORY OF COLON CANCER: ICD-10-CM

## 2020-09-21 DIAGNOSIS — M79.7 FIBROMYALGIA: ICD-10-CM

## 2020-09-21 DIAGNOSIS — R31.29 MICROSCOPIC HEMATURIA: ICD-10-CM

## 2020-09-21 DIAGNOSIS — R53.82 CHRONIC FATIGUE: ICD-10-CM

## 2020-09-21 DIAGNOSIS — F17.200 SMOKING: ICD-10-CM

## 2020-09-21 DIAGNOSIS — F33.2 SEVERE EPISODE OF RECURRENT MAJOR DEPRESSIVE DISORDER, WITHOUT PSYCHOTIC FEATURES (HCC): Primary | ICD-10-CM

## 2020-09-21 LAB
ATRIAL RATE: 68 BPM
CALCULATED P AXIS, ECG09: 33 DEGREES
CALCULATED R AXIS, ECG10: 0 DEGREES
CALCULATED T AXIS, ECG11: -4 DEGREES
DIAGNOSIS, 93000: NORMAL
P-R INTERVAL, ECG05: 132 MS
Q-T INTERVAL, ECG07: 402 MS
QRS DURATION, ECG06: 80 MS
QTC CALCULATION (BEZET), ECG08: 427 MS
VENTRICULAR RATE, ECG03: 68 BPM
XX-LABCORP SPECIMEN COL,LCBCF: NORMAL

## 2020-09-21 PROCEDURE — 99001 SPECIMEN HANDLING PT-LAB: CPT

## 2020-09-21 PROCEDURE — 93005 ELECTROCARDIOGRAM TRACING: CPT

## 2020-09-21 RX ORDER — IBUPROFEN 200 MG
1 TABLET ORAL EVERY 24 HOURS
Qty: 30 PATCH | Refills: 1 | Status: SHIPPED | OUTPATIENT
Start: 2020-09-21 | End: 2020-11-09

## 2020-09-21 RX ORDER — TRAZODONE HYDROCHLORIDE 100 MG/1
1 TABLET ORAL DAILY
COMMUNITY
Start: 2020-09-08 | End: 2021-04-02 | Stop reason: SDUPTHER

## 2020-09-21 NOTE — PROGRESS NOTES
Called Dr. Salazar Ready office and requested last 2 office notes with lab results to be faxed to [phone]

## 2020-09-21 NOTE — PROGRESS NOTES
1. Have you been to the ER, urgent care clinic since your last visit? Hospitalized since your last visit? [facility] 2020    2. Have you seen or consulted any other health care providers outside of the [address] [place] [address] since your last visit? Include any pap smears or colon screening. No    Chief Complaint   Patient presents with    Nicotine Dependence     wants to quit smoking    Referral Request     colonoscopy       Patient presents for the following vaccines: Influenza. Patient consent obtained by patient. Patient tolerated procedure well at right deltoid. Patient remained in exam room, during vaccine documentation, period of 10 minutes post injection to ensure no reaction. VIS was given.       Lot # [id number] [employer]  Exp: 2021  [facility].  [address]: [id number]

## 2020-09-21 NOTE — PATIENT INSTRUCTIONS
Vaccine Information Statement Influenza (Flu) Vaccine (Inactivated or Recombinant): What You Need to Know Many Vaccine Information Statements are available in [place] and other languages. See www.immunize.org/vis Hojas de información sobre vacunas están disponibles en español y en muchos otros idiomas. Visite www.immunize.org/vis 1. Why get vaccinated? Influenza vaccine can prevent influenza (flu). Flu is a contagious disease that spreads around the [facility] every year, usually between October and May. Anyone can get the flu, but it is more dangerous for some people. Infants and young children, people 72years of age and older, pregnant women, and people with certain health conditions or a weakened immune system are at greatest risk of flu complications. Pneumonia, bronchitis, sinus infections and ear infections are examples of flu-related complications. If you have a medical condition, such as heart disease, cancer or diabetes, flu can make it worse. Flu can cause fever and chills, sore throat, muscle aches, fatigue, cough, headache, and runny or stuffy nose. Some people may have vomiting and diarrhea, though this is more common in children than adults. Each year thousands of people in the [facility] die from flu, and many more are hospitalized. Flu vaccine prevents millions of illnesses and flu-related visits to the doctor each year. 2. Influenza vaccines CDC recommends everyone 10months of age and older get vaccinated every flu season. Children 6 months through 6years of age may need 2 doses during a single flu season. Everyone else needs only 1 dose each flu season. It takes about 2 weeks for protection to develop after vaccination. There are many flu viruses, and they are always changing. Each year a new flu vaccine is made to protect against three or four viruses that are likely to cause disease in the upcoming flu season.  Even when the vaccine doesnt exactly match these viruses, it may still provide some protection. Influenza vaccine does not cause flu. Influenza vaccine may be given at the same time as other vaccines. 3. Talk with your health care provider Tell your vaccine provider if the person getting the vaccine: 
 Has had an allergic reaction after a previous dose of influenza vaccine, or has any severe, life-threatening allergies.  Has ever had Guillain-Barré Syndrome (also called GBS). In some cases, your health care provider may decide to postpone influenza vaccination to a future visit. People with minor illnesses, such as a cold, may be vaccinated. People who are moderately or severely ill should usually wait until they recover before getting influenza vaccine. Your health care provider can give you more information. 4. Risks of a reaction  Soreness, redness, and swelling where shot is given, fever, muscle aches, and headache can happen after influenza vaccine.  There may be a very small increased risk of Guillain-Barré Syndrome (GBS) after inactivated influenza vaccine (the flu shot). [facility] children who get the flu shot along with pneumococcal vaccine (PCV13), and/or DTaP vaccine at the same time might be slightly more likely to have a seizure caused by fever. Tell your health care provider if a child who is getting flu vaccine has ever had a seizure. People sometimes faint after medical procedures, including vaccination. Tell your provider if you feel dizzy or have vision changes or ringing in the ears. As with any medicine, there is a very remote chance of a vaccine causing a severe allergic reaction, other serious injury, or death. 5. What if there is a serious problem? An allergic reaction could occur after the vaccinated person leaves the clinic.  If you see signs of a severe allergic reaction (hives, swelling of the face and throat, difficulty breathing, a fast heartbeat, dizziness, or weakness), call 9-1-1 and get the person to the nearest hospital. 
 
For other signs that concern you, call your health care provider. Adverse reactions should be reported to the Vaccine Adverse Event Reporting System (VAERS). Your health care provider will usually file this report, or you can do it yourself. Visit the VAERS website at www.vaers. hhs.gov or call 2-389.742.3093. VAERS is only for reporting reactions, and VAERS staff do not give medical advice. 6. The National Vaccine Injury Compensation Program 
 
The [facility] Vaccine Injury Compensation Program (VICP) is a federal program that was created to compensate people who may have been injured by certain vaccines. Visit the VICP website at www.hrsa.gov/vaccinecompensation or call 6-694.342.1980 to learn about the program and about filing a claim. There is a time limit to file a claim for compensation. 7. How can I learn more?  Ask your health care provider.  Call your local or state health department.  Contact the Centers for Disease Control and Prevention (CDC): 
- Call 0-843.357.6542 (1-920-EAQ-INFO) or 
- Visit CDCs influenza website at www.cdc.gov/flu Vaccine Information Statement (Interim) Inactivated Influenza Vaccine 8/15/2019 
42 LEI Moreno [id number] Department of [facility] and [employer] Centers for Disease Control and Prevention Office Use Only Stopping Smoking: Care Instructions Your Care Instructions Cigarette smokers crave the nicotine in cigarettes. Giving it up is much harder than simply changing a habit. Your body has to stop craving the nicotine. It is hard to quit, but you can do it. There are many tools that people use to quit smoking. You may find that combining tools works best for you. There are several steps to quitting. First you get ready to quit. Then you get support to help you.  After that, you learn new skills and behaviors to become a nonsmoker. For many people, a necessary step is getting and using medicine. Your doctor will help you set up the plan that best meets your needs. You may want to attend a smoking cessation program to help you quit smoking. When you choose a program, look for one that has proven success. Ask your doctor for ideas. You will greatly increase your chances of success if you take medicine as well as get counseling or join a cessation program. 
Some of the changes you feel when you first quit tobacco are uncomfortable. Your body will miss the nicotine at first, and you may feel short-tempered and grumpy. You may have trouble sleeping or concentrating. Medicine can help you deal with these symptoms. You may struggle with changing your smoking habits and rituals. The last step is the tricky one: Be prepared for the smoking urge to continue for a time. This is a lot to deal with, but keep at it. You will feel better. Follow-up care is a key part of your treatment and safety. Be sure to make and go to all appointments, and call your doctor if you are having problems. It's also a good idea to know your test results and keep a list of the medicines you take. How can you care for yourself at home? · Ask your family, friends, and coworkers for support. You have a better chance of quitting if you have help and support. · Join a support group, such as Nicotine Anonymous, for people who are trying to quit smoking. · Consider signing up for a smoking cessation program, such as the American Lung Association's Freedom from Smoking program. 
· Get text messaging support. Go to the website at www.smokefree. gov to sign up for the [facility] program. 
· Set a quit date. Pick your date carefully so that it is not right in the middle of a big deadline or stressful time. Once you quit, do not even take a puff.  Get rid of all ashtrays and lighters after your last cigarette. Clean your house and your clothes so that they do not smell of smoke. · Learn how to be a nonsmoker. Think about ways you can avoid those things that make you reach for a cigarette. ? Avoid situations that put you at greatest risk for smoking. For some people, it is hard to have a drink with friends without smoking. For others, they might skip a coffee break with coworkers who smoke. ? Change your daily routine. Take a different route to work or eat a meal in a different place. · Cut down on stress. Calm yourself or release tension by doing an activity you enjoy, such as reading a book, taking a hot bath, or gardening. · Talk to your doctor or pharmacist about nicotine replacement therapy, which replaces the nicotine in your body. You still get nicotine but you do not use tobacco. Nicotine replacement products help you slowly reduce the amount of nicotine you need. These products come in several forms, many of them available over-the-counter: ? Nicotine patches ? Nicotine gum and lozenges ? Nicotine inhaler · Ask your doctor about bupropion (Wellbutrin) or varenicline (Chantix), which are prescription medicines. They do not contain nicotine. They help you by reducing withdrawal symptoms, such as stress and anxiety. · Some people find hypnosis, acupuncture, and massage helpful for ending the smoking habit. · Eat a healthy diet and get regular exercise. Having healthy habits will help your body move past its craving for nicotine. · Be prepared to keep trying. Most people are not successful the first few times they try to quit. Do not get mad at yourself if you smoke again. Make a list of things you learned and think about when you want to try again, such as next week, next month, or next year. Where can you learn more? Go to http://www.gray.com/ Enter E775 in the search box to learn more about \"Stopping Smoking: Care Instructions. \" 
 Current as of: March 12, 2020               Content Version: 12.6 © 6021-0054 [employer], [email or web address]. Care instructions adapted under license by [employer] (which disclaims liability or warranty for this information). If you have questions about a medical condition or this instruction, always ask your healthcare professional. [address] any warranty or liability for your use of this information. Deciding About Using Medicines To Quit Smoking How can you decide about using medicines to quit smoking? What are the medicines you can use? Your doctor may prescribe varenicline (Chantix) or bupropion (Zyban). These medicines can help you cope with cravings for tobacco. They are pills that don't contain nicotine. You also can use nicotine replacement products. These do contain nicotine. There are many types. · Gum and lozenges slowly release nicotine into your mouth. · Patches stick to your skin. They slowly release nicotine into your bloodstream. 
· An inhaler has a bran that contains nicotine. You breathe in a puff of nicotine vapor through your mouth and throat. · Nasal spray releases a mist that contains nicotine. What are key points about this decision? · Using medicines can double your chances of quitting smoking. They can ease cravings and withdrawal symptoms. · Getting counseling along with using medicine can raise your chances of quitting even more. · If you smoke fewer than 5 cigarettes a day, you may not need medicines to help you quit smoking. · These medicines have less nicotine than cigarettes. And by itself, nicotine is not nearly as harmful as smoking. The tars, carbon monoxide, and other toxic chemicals in tobacco cause the harmful effects. · The side effects of nicotine replacement products depend on the type of product. For example, a patch can make your skin red and itchy.  Medicines in pill form can make you sick to your stomach. They can also cause dry mouth and trouble sleeping. For most people, the side effects are not bad enough to make them stop using the products. Why might you choose to use medicines to quit smoking? · You have tried on your own to stop smoking, but you were not able to stop. · You smoke more than 5 cigarettes a day. · You want to increase your chances of quitting smoking. · You want to reduce your cravings and withdrawal symptoms. · You feel the benefits of medicine outweigh the side effects. Why might you choose not to use medicine? · You want to try quitting on your own by stopping all at once (\"cold turkey\"). · You want to cut back slowly on the number of cigarettes you smoke. · You smoke fewer than 5 cigarettes a day. · You do not like using medicine. · You feel the side effects of medicines outweigh the benefits. · You are worried about the cost of medicines. Your decision Thinking about the facts and your feelings can help you make a decision that is right for you. Be sure you understand the benefits and risks of your options, and think about what else you need to do before you make the decision. Where can you learn more? Go to http://kosta-michelle.info/ Enter O882 in the search box to learn more about \"Deciding About Using Medicines To Quit Smoking. \" Current as of: March 12, 2020               Content Version: 12.6 © 5648-4515 [employer], [employer]. Care instructions adapted under license by [employer] (which disclaims liability or warranty for this information). If you have questions about a medical condition or this instruction, always ask your healthcare professional. [address] any warranty or liability for your use of this information. Learning About Benefits From Quitting Smoking How does quitting smoking make you healthier? If you're thinking about quitting smoking, you may have a few reasons to be smoke-free. Your health may be one of them. · When you quit smoking, you lower your risks for cancer, lung disease, heart attack, stroke, blood vessel disease, and blindness from macular degeneration. · When you're smoke-free, you get sick less often, and you heal faster. You are less likely to get colds, flu, bronchitis, and pneumonia. · As a nonsmoker, you may find that your mood is better and you are less stressed. When and how will you feel healthier? Quitting has real health benefits that start from day 1 of being smoke-free. And the longer you stay smoke-free, the healthier you get and the better you feel. The first hours · After just 20 minutes, your blood pressure and heart rate go down. That means there's less stress on your heart and blood vessels. · Within 12 hours, the level of carbon monoxide in your blood drops back to normal. That makes room for more oxygen. With more oxygen in your body, you may notice that you have more energy than when you smoked. After 2 weeks · Your lungs start to work better. · Your risk of heart attack starts to drop. After 1 month · When your lungs are clear, you cough less and breathe deeper, so it's easier to be active. · Your sense of taste and smell return. That means you can enjoy food more than you have since you started smoking. Over the years · Over the years, your risks of heart disease, heart attack, and stroke are lower. · After 10 years, your risk of dying from lung cancer is cut by about half. And your risk for many other types of cancer is lower too. How would quitting help others in your life? When you quit smoking, you improve the health of everyone who now breathes in your smoke. · Their heart, lung, and cancer risks drop, much like yours. · They are sick less.  For babies and small children, living smoke-free means they're less likely to have ear infections, pneumonia, and bronchitis. · If you're a woman who is or will be pregnant someday, quitting smoking means a healthier . · Children who are close to you are less likely to become adult smokers. Where can you learn more? Go to http://brambila-michelle.info/ Enter [id number] [id number] in the search box to learn more about \"Learning About Benefits From Quitting Smoking. \" Current as of: 2020               Content Version: 12.6 © 3391-0035 [employer]. Care instructions adapted under license by [employer] (which disclaims liability or warranty for this information). If you have questions about a medical condition or this instruction, always ask your healthcare professional. [address] any warranty or liability for your use of this information. Fibromyalgia: Care Instructions Overview Fibromyalgia is a painful condition that is not completely understood by medical experts. The cause of fibromyalgia is not known. It can make you feel tired and ache all over. It causes tender spots at specific points of the body that hurt only when you press on them. You may have trouble sleeping, as well as other symptoms. These problems can upset your work and home life. Symptoms tend to come and go, although they may never go away completely. Fibromyalgia does not harm your muscles, joints, or organs. Follow-up care is a key part of your treatment and safety. Be sure to make and go to all appointments, and call your doctor if you are having problems. It's also a good idea to know your test results and keep a list of the medicines you take. How can you care for yourself at home? · Exercise often. Walk, swim, or bike to help with pain and sleep problems and to make you feel better. · Try to get a good night's sleep.  Go to bed and get up at the same time each day, whether you feel rested or not. Make sure you have a good mattress and pillow. · Reduce stress. Avoid things that cause you stress, if you can. If not, work at making them less stressful. Learn to use biofeedback, guided imagery, meditation, or other methods to relax. · Make healthy changes. Eat a balanced diet, quit smoking, and limit alcohol and caffeine. · Use a heating pad set on low or take warm baths or showers for pain. Using cold packs for up to 20 minutes at a time can also relieve pain. Put a thin cloth between the cold pack and your skin. A gentle massage might help too. · Be safe with medicines. Take your medicines exactly as prescribed. Call your doctor if you think you are having a problem with your medicine. Your doctor may talk to you about taking antidepressant medicines. These medicines may improve sleep, relieve pain, and in some cases treat depression. · Learn about fibromyalgia. This makes coping easier. Then, take an active role in your treatment. · Think about joining a support group with others who have fibromyalgia to learn more and get support. When should you call for help? Watch closely for changes in your health, and be sure to contact your doctor if: 
  · You feel sad, helpless, or hopeless; lose interest in things you used to enjoy; or have other symptoms of depression.  
  · Your fibromyalgia symptoms get worse. Where can you learn more? Go to http://kosta-michelle.info/ Enter V003 in the search box to learn more about \"Fibromyalgia: Care Instructions. \" Current as of: November 20, 2019               Content Version: 12.6 © 5361-4668 [employer], Incorporated. Care instructions adapted under license by [employer] (which disclaims liability or warranty for this information).  If you have questions about a medical condition or this instruction, always ask your healthcare professional. Yuliya Koehler disclaims any warranty or liability for your use of this information. Depression and Chronic Disease: Care Instructions Your Care Instructions A chronic disease is one that you have for a long time. Some chronic diseases can be controlled, but they usually cannot be cured. Depression is common in people with chronic diseases, but it often goes unnoticed. Many people have concerns about seeking treatment for a mental health problem. You may think it's a sign of weakness, or you don't want people to know about it. It's important to overcome these reasons for not seeking treatment. Treating depression or anxiety is good for your health. Follow-up care is a key part of your treatment and safety. Be sure to make and go to all appointments, and call your doctor if you are having problems. It's also a good idea to know your test results and keep a list of the medicines you take. How can you care for yourself at home? Watch for symptoms of depression The symptoms of depression are often subtle at first. You may think they are caused by your disease rather than depression. Or you may think it is normal to be depressed when you have a chronic disease. If you are depressed you may: · Feel sad or hopeless. · Feel guilty or worthless. · Not enjoy the things you used to enjoy. · Feel hopeless, as though life is not worth living. · Have trouble thinking or remembering. · Have low energy, and you may not eat or sleep well. · Pull away from others. · Think often about death or killing yourself. (Keep the numbers for these national suicide hotlines: 6-575-402-TALK [1-198.653.5971] and 4-039-PRGSRYK [1-321.886.2843]. ) Get treatment By treating your depression, you can feel more hopeful and have more energy. If you feel better, you may take better care of yourself, so your health may improve. · Talk to your doctor if you have any changes in mood during treatment for your disease. · Ask your doctor for help. Counseling, antidepressant medicine, or a combination of the two can help most people with depression. Often a combination works best. Counseling can also help you cope with having a chronic disease. When should you call for help? Call 911 anytime you think you may need emergency care. For example, call if: 
  · You feel like hurting yourself or someone else.  
  · Someone you know has depression and is about to attempt or is attempting suicide. Call your doctor now or seek immediate medical care if: 
  · You hear voices.  
  · Someone you know has depression and: 
? Starts to give away his or her possessions. ? Uses illegal drugs or drinks alcohol heavily. ? Talks or writes about death, including writing suicide notes or talking about guns, knives, or pills. ? Starts to spend a lot of time alone. ? Acts very aggressively or suddenly appears calm. Watch closely for changes in your health, and be sure to contact your doctor if: 
  · You do not get better as expected. Where can you learn more? Go to http://kosta-michelle.info/ Enter G913 in the search box to learn more about \"Depression and Chronic Disease: Care Instructions. \" Current as of: January 31, 2020               Content Version: 12.6 © 0230-9459 [employer]. Care instructions adapted under license by [employer] (which disclaims liability or warranty for this information). If you have questions about a medical condition or this instruction, always ask your healthcare professional. [place] [address] any warranty or liability for your use of this information.

## 2020-09-21 NOTE — PROGRESS NOTES
HISTORY OF PRESENT ILLNESS  Natividad Hawley is a 52 y.o. female. HPI: Here for follow-up. History of depression. Following counselor and psychiatrist.  Taking medication with compliance. Currently no mood changes. Sitting comfortable and did not appear in any acute distress. History of fibromyalgia. Multiple site joint pain. Chronic fatigue. Following Dr. Margo Anguiano. On symptomatic treatment. It has been fairly stable. Last visit she had a urinary discomfort and done UA. Showed blood in the urinalysis  for now she is asymptomatic. No regina hematuria. Pending a repeat UA. She is aware and she will do it today. Today said she feels occasionally fluttering feeling in her chest.  Does have a history of some depression and anxiousness. Said episode last for few seconds and not associated with any anxiety or mood changes. No chest pain or shortness of breath associated with it. No diaphoresis. No headache or dizziness. No nausea vomiting. No abdominal pain. No urinary or bowel complaint. No appetite or weight changes. Does have a history of multinodular goiter. Has seen Endo. Will try to obtain the notes. Currently denies any trouble swallowing or change in voice. No bowel complaints. No skin changes. No mood changes. No cold cough or wheezing. No fever. Does smoke. Wants to quit. Done smoking cessation counseling. Discussed importance of smoking cessation. Given hand out on smoking cessation benefit. Discuss adverse effect of smoking like lung disease, heart disease, risk factor for so many malignancies etc. Understood the importance of smoking cessation. Also discussed different approach for smoking cessation , medication. Discussed patch, gum and other oral medication. She agrees to use patch. Discussed how to use patch. Side effects of patch. She understood it well. Done counseling was done 10 minutes and more than 3 minutes.        Visit Vitals  /60 (BP 1 Location: Left arm, BP Patient Position: Sitting)   Pulse 75   Temp 98.8 °F (37.1 °C) (Oral)   Resp 16   Wt 203 lb (92.1 kg)   SpO2 98%   BMI 32.77 kg/m²     Review medication list, vitals, problem list,allergies. Lab Results   Component Value Date/Time    WBC 8.1 05/21/2019 04:44 PM    HGB 11.3 (L) 05/21/2019 04:44 PM    HCT 35.3 05/21/2019 04:44 PM    PLATELET 347 97/79/5483 04:44 PM    MCV 87.6 05/21/2019 04:44 PM     Lab Results   Component Value Date/Time    Sodium 142 05/21/2019 04:44 PM    Potassium 3.6 05/21/2019 04:44 PM    Chloride 105 05/21/2019 04:44 PM    CO2 31 05/21/2019 04:44 PM    Anion gap 6 05/21/2019 04:44 PM    Glucose 77 05/21/2019 04:44 PM    BUN 12 05/21/2019 04:44 PM    Creatinine 1.02 05/21/2019 04:44 PM    BUN/Creatinine ratio 12 05/21/2019 04:44 PM    GFR est AA >60 05/21/2019 04:44 PM    GFR est non-AA 58 (L) 05/21/2019 04:44 PM    Calcium 8.8 05/21/2019 04:44 PM    Bilirubin, total 0.2 05/21/2019 04:44 PM    Alk. phosphatase 93 05/21/2019 04:44 PM    Protein, total 7.3 05/21/2019 04:44 PM    Albumin 3.6 05/21/2019 04:44 PM    Globulin 3.7 05/21/2019 04:44 PM    A-G Ratio 1.0 05/21/2019 04:44 PM    ALT (SGPT) 27 05/21/2019 04:44 PM    AST (SGOT) 17 05/21/2019 04:44 PM     Lab Results   Component Value Date/Time    Cholesterol, total 176 12/07/2018 12:00 AM    HDL Cholesterol 59 12/07/2018 12:00 AM    LDL, calculated 97 12/07/2018 12:00 AM    VLDL, calculated 20 12/07/2018 12:00 AM    Triglyceride 99 12/07/2018 12:00 AM     Lab Results   Component Value Date/Time    TSH 1.050 08/02/2019 12:00 AM     Lab Results   Component Value Date/Time    Hemoglobin A1c 5.3 12/07/2018 12:00 AM     ROS: See HPI    Physical Exam  Constitutional:       General: She is not in acute distress. Neck:      Comments: Palpable enlarged thyroid gland  Cardiovascular:      Rate and Rhythm: Normal rate and regular rhythm. Heart sounds: Normal heart sounds.    Abdominal:      General: Bowel sounds are normal.      Palpations: Abdomen is soft. Tenderness: There is no abdominal tenderness. Lymphadenopathy:      Cervical: No cervical adenopathy. Neurological:      Mental Status: She is oriented to person, place, and time. Psychiatric:         Behavior: Behavior normal.         ASSESSMENT and PLAN    ICD-10-CM ICD-9-CM    1. Severe episode of recurrent major depressive disorder, without psychotic features ([facility] [address]): For now stable. Compliance with current medication. Following counselor and psychiatry. Sleep has been fair F33.2 296.33 traZODone (DESYREL) 100 mg tablet   2. Fibromyalgia: Following Dr. Margo Anguiano. Stable on symptomatic treatment M79.7 729.1    3. Multiple joint pain: Stable on symptomatic treatment. Following rheumatologist M25.50 719.49    4. Chronic fatigue  R53.82 780.79    5. Microscopic hematuria: Pending UA. She is going to complete it today R31.29 599.72    6. Smoking: Done smoking cessation counseling. See HPI. Given NicoDerm patch F17.200 305.1 nicotine (NICODERM CQ) 14 mg/24 hr patch   7. Screening for colon cancer  Z12.11 V76.51 REFERRAL TO GASTROENTEROLOGY   8. Encounter for immunization  Z23 V03.89 INFLUENZA VIRUS VAC QUAD,SPLIT,PRESV FREE SYRINGE IM   9. Family history of colon cancer: 2 uncles from mother side. Unknown history from father side. Will send for GI consultation if she would qualify for early colonoscopy Z80.0 V16.0 REFERRAL TO GASTROENTEROLOGY   10. Trouble getting to sleep  G47.09 780.52    11. Palpitation: We will check labs. Also ordered EKG. Episode last for few seconds. No associated chest pain or shortness of breath or diaphoresis. Will observe. Could be related to anxiety R00.2 785.1 EKG, 12 LEAD, INITIAL      TSH 3RD GENERATION      CBC W/O DIFF      METABOLIC PANEL, COMPREHENSIVE   12. Multinodular goiter: Following Endo.   For now will obtain the notes E04.2 241.1    Patient understood and agree with the plan  Review health maintenance up-to-date with mammogram    Follow-up and Dispositions    · Return in about 2 months (around 11/21/2020).

## 2020-09-22 DIAGNOSIS — R31.29 MICROSCOPIC HEMATURIA: Primary | ICD-10-CM

## 2020-09-22 LAB
ALBUMIN SERPL-MCNC: 3.9 G/DL (ref 3.8–4.8)
ALBUMIN/GLOB SERPL: 1.4 {RATIO} (ref 1.2–2.2)
ALP SERPL-CCNC: 75 IU/L (ref 39–117)
ALT SERPL-CCNC: 16 IU/L (ref 0–32)
APPEARANCE UR: CLEAR
AST SERPL-CCNC: 20 IU/L (ref 0–40)
BACTERIA #/AREA URNS HPF: ABNORMAL /[HPF]
BILIRUB SERPL-MCNC: <0.2 MG/DL (ref 0–1.2)
BILIRUB UR QL STRIP: NEGATIVE
BUN SERPL-MCNC: 11 MG/DL (ref 6–24)
BUN/CREAT SERPL: 14 (ref 9–23)
CALCIUM SERPL-MCNC: 9.4 MG/DL (ref 8.7–10.2)
CASTS URNS QL MICRO: ABNORMAL /LPF
CHLORIDE SERPL-SCNC: 101 MMOL/L (ref 96–106)
CO2 SERPL-SCNC: 23 MMOL/L (ref 20–29)
COLOR UR: YELLOW
CREAT SERPL-MCNC: 0.76 MG/DL (ref 0.57–1)
EPI CELLS #/AREA URNS HPF: ABNORMAL /HPF (ref 0–10)
ERYTHROCYTE [DISTWIDTH] IN BLOOD BY AUTOMATED COUNT: 11.8 % (ref 11.7–15.4)
GLOBULIN SER CALC-MCNC: 2.7 G/DL (ref 1.5–4.5)
GLUCOSE SERPL-MCNC: 106 MG/DL (ref 65–99)
GLUCOSE UR QL: NEGATIVE
HCT VFR BLD AUTO: 32.6 % (ref 34–46.6)
HGB BLD-MCNC: 10.7 G/DL (ref 11.1–15.9)
HGB UR QL STRIP: ABNORMAL
KETONES UR QL STRIP: NEGATIVE
LEUKOCYTE ESTERASE UR QL STRIP: ABNORMAL
MCH RBC QN AUTO: 28.2 PG (ref 26.6–33)
MCHC RBC AUTO-ENTMCNC: 32.8 G/DL (ref 31.5–35.7)
MCV RBC AUTO: 86 FL (ref 79–97)
MICRO URNS: ABNORMAL
MUCOUS THREADS URNS QL MICRO: PRESENT
NITRITE UR QL STRIP: NEGATIVE
PH UR STRIP: 5.5 [PH] (ref 5–7.5)
PLATELET # BLD AUTO: 290 X10E3/UL (ref 150–450)
POTASSIUM SERPL-SCNC: 4 MMOL/L (ref 3.5–5.2)
PROT SERPL-MCNC: 6.6 G/DL (ref 6–8.5)
PROT UR QL STRIP: ABNORMAL
RBC # BLD AUTO: 3.8 X10E6/UL (ref 3.77–5.28)
RBC #/AREA URNS HPF: ABNORMAL /HPF (ref 0–2)
SODIUM SERPL-SCNC: 141 MMOL/L (ref 134–144)
SP GR UR: 1.02 (ref 1–1.03)
TSH SERPL DL<=0.005 MIU/L-ACNC: 1.28 UIU/ML (ref 0.45–4.5)
UROBILINOGEN UR STRIP-MCNC: 0.2 MG/DL (ref 0.2–1)
WBC # BLD AUTO: 7 X10E3/UL (ref 3.4–10.8)
WBC #/AREA URNS HPF: ABNORMAL /HPF (ref 0–5)

## 2020-09-22 NOTE — PROGRESS NOTES
Microscopic hematuria. Sending to urology. tsh wnl. Also noted anemia. Advise pt to start taking multivitamin and iron rich food. Please look in to my chart message as well.

## 2020-10-06 DIAGNOSIS — M19.90 ARTHRITIS: ICD-10-CM

## 2020-11-02 DIAGNOSIS — M79.7 FIBROMYALGIA: Primary | ICD-10-CM

## 2020-11-02 DIAGNOSIS — M13.0 POLYARTHRITIS: ICD-10-CM

## 2020-11-09 DIAGNOSIS — F17.200 SMOKING: ICD-10-CM

## 2020-11-09 RX ORDER — IBUPROFEN 200 MG
TABLET ORAL
Qty: 28 PATCH | Refills: 1 | Status: SHIPPED | OUTPATIENT
Start: 2020-11-09 | End: 2020-12-02

## 2020-11-16 ENCOUNTER — TRANSCRIBE ORDER (OUTPATIENT)
Dept: SCHEDULING | Age: 50
End: 2020-11-16

## 2020-11-16 DIAGNOSIS — Z12.31 ENCOUNTER FOR SCREENING MAMMOGRAM FOR BREAST CANCER: Primary | ICD-10-CM

## 2020-11-17 ENCOUNTER — PATIENT MESSAGE (OUTPATIENT)
Dept: FAMILY MEDICINE CLINIC | Age: 50
End: 2020-11-17

## 2020-11-20 ENCOUNTER — TELEPHONE (OUTPATIENT)
Dept: FAMILY MEDICINE CLINIC | Age: 50
End: 2020-11-20

## 2020-11-23 ENCOUNTER — OFFICE VISIT (OUTPATIENT)
Dept: FAMILY MEDICINE CLINIC | Age: 50
End: 2020-11-23
Payer: MEDICAID

## 2020-11-23 VITALS
HEART RATE: 66 BPM | BODY MASS INDEX: 32.86 KG/M2 | SYSTOLIC BLOOD PRESSURE: 106 MMHG | RESPIRATION RATE: 16 BRPM | DIASTOLIC BLOOD PRESSURE: 70 MMHG | WEIGHT: 203.6 LBS | TEMPERATURE: 98.1 F | OXYGEN SATURATION: 99 %

## 2020-11-23 DIAGNOSIS — Z87.891 QUIT SMOKING: ICD-10-CM

## 2020-11-23 DIAGNOSIS — R10.10 UPPER ABDOMINAL PAIN: Primary | ICD-10-CM

## 2020-11-23 DIAGNOSIS — N83.202 CYST OF LEFT OVARY: ICD-10-CM

## 2020-11-23 DIAGNOSIS — R10.2 PELVIC PAIN: ICD-10-CM

## 2020-11-23 DIAGNOSIS — Z23 NEED FOR PNEUMOCOCCAL VACCINATION: ICD-10-CM

## 2020-11-23 DIAGNOSIS — R10.10 UPPER ABDOMINAL PAIN: ICD-10-CM

## 2020-11-23 PROCEDURE — 90732 PPSV23 VACC 2 YRS+ SUBQ/IM: CPT

## 2020-11-23 PROCEDURE — 99214 OFFICE O/P EST MOD 30 MIN: CPT | Performed by: FAMILY MEDICINE

## 2020-11-23 RX ORDER — ESTRADIOL 0.5 MG/1
1 TABLET ORAL DAILY
COMMUNITY
End: 2021-10-25 | Stop reason: ALTCHOICE

## 2020-11-23 NOTE — PATIENT INSTRUCTIONS
Musculoskeletal Pain: Care Instructions Your Care Instructions Different problems with the bones, muscles, nerves, ligaments, and tendons in the body can cause pain. One or more areas of your body may ache or burn. Or they may feel tired, stiff, or sore. The medical term for this type of pain is musculoskeletal pain. It can have many different causes. Sometimes the pain is caused by an injury such as a strain or sprain. Or you might have pain from using one part of your body in the same way over and over again. This is called overuse. In some cases, the cause of the pain is another health problem such as arthritis or fibromyalgia. The doctor will examine you and ask you questions about your health to help find the cause of your pain. Blood tests or imaging tests like an X-ray may also be helpful. But sometimes doctors can't find a cause of the pain. Treatment depends on your symptoms and the cause of the pain, if known. The doctor has checked you carefully, but problems can develop later. If you notice any problems or new symptoms, get medical treatment right away. Follow-up care is a key part of your treatment and safety. Be sure to make and go to all appointments, and call your doctor if you are having problems. It's also a good idea to know your test results and keep a list of the medicines you take. How can you care for yourself at home? · Rest until you feel better. · Do not do anything that makes the pain worse. Return to exercise gradually if you feel better and your doctor says it's okay. · Be safe with medicines. Read and follow all instructions on the label. ? If the doctor gave you a prescription medicine for pain, take it as prescribed. ? If you are not taking a prescription pain medicine, ask your doctor if you can take an over-the-counter medicine. · Put ice or a cold pack on the area for 10 to 20 minutes at a time to ease pain. Put a thin cloth between the ice and your skin. When should you call for help? Call your doctor now or seek immediate medical care if: 
  · You have new pain, or your pain gets worse.  
  · You have new symptoms such as a fever, a rash, or chills. Watch closely for changes in your health, and be sure to contact your doctor if: 
  · You do not get better as expected. Where can you learn more? Go to http://www.gray.com/ Enter E961 in the search box to learn more about \"Musculoskeletal Pain: Care Instructions. \" Current as of: November 20, 2019               Content Version: 12.6 © 1146-2547 [employer]. Care instructions adapted under license by "[employer] (which disclaims liability or warranty for this information). If you have questions about a medical condition or this instruction, always ask your healthcare professional. [address] any warranty or liability for your use of this information. Functional Ovarian Cyst: Care Instructions Your Care Instructions A functional ovarian cyst is a sac that forms on the surface of a woman's ovary during ovulation. The sac holds a maturing egg. Usually the sac goes away after the egg is released. But if the egg is not released, or if the sac closes up after the egg is released, the sac can swell up with fluid and form a cyst. 
Functional ovarian cysts are different than ovarian growths caused by other problems, such as cancer. Most functional ovarian cysts cause no symptoms and go away on their own. Some cause mild pain. Others can cause severe pain when they rupture or bleed. Follow-up care is a key part of your treatment and safety. Be sure to make and go to all appointments, and call your doctor if you are having problems. It's also a good idea to know your test results and keep a list of the medicines you take. How can you care for yourself at home?  
· Use heat, such as a hot water bottle, a heating pad set on low, or a warm bath, to relax tense muscles and relieve cramping. · Be safe with medicines. Take pain medicines exactly as directed. ? If the doctor gave you a prescription medicine for pain, take it as prescribed. ? If you are not taking a prescription pain medicine, ask your doctor if you can take an over-the-counter medicine. · Avoid constipation. Make sure you drink enough fluids and include fruits, vegetables, and fiber in your diet each day. Constipation does not cause ovarian cysts, but it may make your pelvic pain worse. When should you call for help? Call your doctor now or seek immediate medical care if: 
  · You have severe vaginal bleeding.  
  · You have new or worse belly or pelvic pain. Watch closely for changes in your health, and be sure to contact your doctor if: 
  · You have unusual vaginal bleeding.  
  · You do not get better as expected. Where can you learn more? Go to http://www.loredo.com/ Enter D424 in the search box to learn more about \"Functional Ovarian Cyst: Care Instructions. \" Current as of: November 8, 2019               Content Version: 12.6 © 6620-1377 [employer]. Care instructions adapted under license by [employer] (which disclaims liability or warranty for this information). If you have questions about a medical condition or this instruction, always ask your healthcare professional. [address] any warranty or liability for your use of this information. Eating Healthy Foods: Care Instructions Your Care Instructions Eating healthy foods can help lower your risk for disease. Healthy food gives you energy and keeps your heart strong, your brain active, your muscles working, and your bones strong. A healthy diet includes a variety of foods from the basic food groups: grains, vegetables, fruits, milk and milk products, and meat and beans. Some people may eat more of their favorite foods from only one food group and, as a result, miss getting the nutrients they need. So, it is important to pay attention not only to what you eat but also to what you are missing from your diet. You can eat a healthy, balanced diet by making a few small changes. Follow-up care is a key part of your treatment and safety. Be sure to make and go to all appointments, and call your doctor if you are having problems. It's also a good idea to know your test results and keep a list of the medicines you take. How can you care for yourself at home? Look at what you eat · Keep a food diary for a week or two and record everything you eat or drink. Track the number of servings you eat from each food group. · For a balanced diet every day, eat a variety of: 
? 6 or more ounce-equivalents of grains, such as cereals, breads, crackers, rice, or pasta, every day. An ounce-equivalent is 1 slice of bread, 1 cup of ready-to-eat cereal, or ½ cup of cooked rice, cooked pasta, or cooked cereal. 
? 2½ cups of vegetables, especially: § Dark-green vegetables such as broccoli and spinach. § Orange vegetables such as carrots and sweet potatoes. § Dry beans (such as pelayo and kidney beans) and peas (such as lentils). ? 2 cups of fresh, frozen, or canned fruit. A small apple or 1 banana or orange equals 1 cup. ? 3 cups of nonfat or low-fat milk, yogurt, or other milk products. ? 5½ ounces of meat and beans, such as chicken, fish, lean meat, beans, nuts, and seeds. One egg, 1 tablespoon of peanut butter, ½ ounce nuts or seeds, or ¼ cup of cooked beans equals 1 ounce of meat. · Learn how to read food labels for serving sizes and ingredients. Fast-food and convenience-food meals often contain few or no fruits or vegetables. Make sure you eat some fruits and vegetables to make the meal more nutritious. · Look at your food diary.  For each food group, add up what you have eaten and then divide the total by the number of days. This will give you an idea of how much you are eating from each food group. See if you can find some ways to change your diet to make it more healthy. Start small · Do not try to make dramatic changes to your diet all at once. You might feel that you are missing out on your favorite foods and then be more likely to fail. · Start slowly, and gradually change your habits. Try some of the following: ? Use whole wheat bread instead of white bread. ? Use nonfat or low-fat milk instead of whole milk. ? Eat brown rice instead of white rice, and eat whole wheat pasta instead of white-flour pasta. ? Try low-fat cheeses and low-fat yogurt. ? Add more fruits and vegetables to meals and have them for snacks. ? Add lettuce, tomato, cucumber, and onion to sandwiches. ? Add fruit to yogurt and cereal. 
Enjoy food · You can still eat your favorite foods. You just may need to eat less of them. If your favorite foods are high in fat, salt, and sugar, limit how often you eat them, but do not cut them out entirely. · Eat a wide variety of foods. Make healthy choices when eating out · The type of restaurant you choose can help you make healthy choices. Even fast-food chains are now offering more low-fat or healthier choices on the menu. · Choose smaller portions, or take half of your meal home. · When eating out, try: ? A veggie pizza with a whole wheat crust or grilled chicken (instead of sausage or pepperoni). ? Pasta with roasted vegetables, grilled chicken, or marinara sauce instead of cream sauce. ? A vegetable wrap or grilled chicken wrap. ? Broiled or poached food instead of fried or breaded items. Make healthy choices easy · Buy packaged, prewashed, ready-to-eat fresh vegetables and fruits, such as baby carrots, salad mixes, and chopped or shredded broccoli and cauliflower. · Buy packaged, presliced fruits, such as melon or pineapple. · Choose 100% fruit or vegetable juice instead of soda. Limit juice intake to 4 to 6 oz (½ to ¾ cup) a day. · Blend low-fat yogurt, fruit juice, and canned or frozen fruit to make a smoothie for breakfast or a snack. Where can you learn more? Go to http://www.loredo.com/ Enter T756 in the search box to learn more about \"Eating Healthy Foods: Care Instructions. \" Current as of: August 22, 2019               Content Version: 12.6 © 6359-2625 [employer]. Care instructions adapted under license by [employer] (which disclaims liability or warranty for this information). If you have questions about a medical condition or this instruction, always ask your healthcare professional. [address] any warranty or liability for your use of this information. Pneumococcal Polysaccharide Vaccine: What You Need to Know Why get vaccinated? Pneumococcal polysaccharide vaccine (PPSV23) can prevent pneumococcal disease. Pneumococcal disease refers to any illness caused by pneumococcal bacteria. These bacteria can cause many types of illnesses, including pneumonia, which is an infection of the lungs. Pneumococcal bacteria are one of the most common causes of pneumonia. Besides pneumonia, pneumococcal bacteria can also cause: 
· Ear infections, 
· Sinus infections · Meningitis (infection of the tissue covering the brain and spinal cord) · Bacteremia (bloodstream infection) Anyone can get pneumococcal disease, but children under 3years of age, people with certain medical conditions, adults 72 years or older, and cigarette smokers are at the highest risk. Most pneumococcal infections are mild. However, some can result in long-term problems, such as brain damage or hearing loss. Meningitis, bacteremia, and pneumonia caused by pneumococcal disease can be fatal. 
PPSV23 
PPSV23 protects against 23 types of bacteria that cause pneumococcal disease. PPSV23 is recommended for: · All adults 72 years or older, · Anyone 2 years or older with certain medical conditions that can lead to an increased risk for pneumococcal disease. Most people need only one dose of PPSV23. A second dose of PPSV23, and another type of pneumococcal vaccine called PCV13, are recommended for certain high-risk groups. Your health care provider can give you more information. People 65 years or older should get a dose of PPSV23 even if they have already gotten one or more doses of the vaccine before they turned 65. Talk with your health care provider Tell your vaccine provider if the person getting the vaccine: 
· Has had an allergic reaction after a previous dose of PPSV23, or has any severe, life-threatening allergies. In some cases, your health care provider may decide to postpone PPSV23 vaccination to a future visit. People with minor illnesses, such as a cold, may be vaccinated. People who are moderately or severely ill should usually wait until they recover before getting PPSV23. Your health care provider can give you more information. Risks of a vaccine reaction · Redness or pain where the shot is given, feeling tired, fever, or muscle aches can happen after PPSV23. People sometimes faint after medical procedures, including vaccination. Tell your provider if you feel dizzy or have vision changes or ringing in the ears. As with any medicine, there is a very remote chance of a vaccine causing a severe allergic reaction, other serious injury, or death. What if there is a serious problem? An allergic reaction could occur after the vaccinated person leaves the clinic. If you see signs of a severe allergic reaction (hives, swelling of the face and throat, difficulty breathing, a fast heartbeat, dizziness, or weakness), call 9-1-1 and get the person to the nearest hospital. 
For other signs that concern you, call your health care provider. Adverse reactions should be reported to the Vaccine Adverse Event Reporting System (VAERS). Your health care provider will usually file this report, or you can do it yourself. Visit the VAERS website at www.vaers. hhs.gov at www.vaers. hhs.gov or call 5-303.171.3513. VAERS is only for reporting reactions, and VAERS staff do not give medical advice. How can I learn more? · Ask your health care provider. · Call your local or state health department. · Contact the Centers for Disease Control and Prevention (CDC): 
? Call 6-500.132.3898 (1-800-CDC-INFO) or 
? Visit CDC's website at www.cdc.gov/vaccines Vaccine Information Statement PPSV23 Vaccine 10/30/2019 [facility] and [employer] Centers for Disease Control and Prevention Many Vaccine Information Statements are available in Filipino and other languages. See www.immunize.org/vis. Hojas de información Sobre Vacunas están disponibles en español y en muchos otros idiomas. Visite Gerardo.si. Care instructions adapted under license by [employer] (which disclaims liability or warranty for this information). If you have questions about a medical condition or this instruction, always ask your healthcare professional. [address] any warranty or liability for your use of this information.

## 2020-11-23 NOTE — PROGRESS NOTES
HISTORY OF PRESENT ILLNESS  Randolph Oppenheim is a 52 y.o. female. HPI: Here for follow-up but having a concern with the right upper abdominal pain. [facility] it is mainly over the right lower back, under right breast, right upper abdominal area. Sometimes it shoots from the inguinal area to right lower extremity. Awake pain. Started since a month. Said no nausea vomiting. No appetite or weight changes. No fever. No urinary or bowel complaint. She does have microscopic hematuria and now pending referral to urology. No history of kidney stone. Review prior CAT scan done in 2017 showed liver cyst and a left ovarian cyst.  Said pain is 5-6/10 intensity. Dull in nature. Only occurs when she is in a sitting position. No GERD symptoms. No chest pain or shortness of breath. No cold cough wheezing or fever. Denies any fall. No pushing or pulling. Pain started almost a month ago. Said occurs only in sitting and lying down makes it better. No prior history of kidney stone. Visit Vitals  /70 (BP 1 Location: Right arm, BP Patient Position: Sitting)   Pulse 66   Temp 98.1 °F (36.7 °C) (Oral)   Resp 16   Wt 203 lb 9.6 oz (92.4 kg)   SpO2 99%   BMI 32.86 kg/m²     Did not appear in any acute distress but she has been sitting leaning towards the left side to get comfortable. While standing it became little bit better.   Lab Results   Component Value Date/Time    WBC 7.0 09/21/2020 12:00 AM    HGB 10.7 (L) 09/21/2020 12:00 AM    HCT 32.6 (L) 09/21/2020 12:00 AM    PLATELET 402 06/61/5288 12:00 AM    MCV 86 09/21/2020 12:00 AM     Lab Results   Component Value Date/Time    Sodium 141 09/21/2020 12:00 AM    Potassium 4.0 09/21/2020 12:00 AM    Chloride 101 09/21/2020 12:00 AM    CO2 23 09/21/2020 12:00 AM    Anion gap 6 05/21/2019 04:44 PM    Glucose 106 (H) 09/21/2020 12:00 AM    BUN 11 09/21/2020 12:00 AM    Creatinine 0.76 09/21/2020 12:00 AM    BUN/Creatinine ratio 14 09/21/2020 12:00 AM    GFR est  09/21/2020 12:00 AM    GFR est non-AA 93 09/21/2020 12:00 AM    Calcium 9.4 09/21/2020 12:00 AM    Bilirubin, total <0.2 09/21/2020 12:00 AM    Alk. phosphatase 75 09/21/2020 12:00 AM    Protein, total 6.6 09/21/2020 12:00 AM    Albumin 3.9 09/21/2020 12:00 AM    Globulin 3.7 05/21/2019 04:44 PM    A-G Ratio 1.4 09/21/2020 12:00 AM    ALT (SGPT) 16 09/21/2020 12:00 AM    AST (SGOT) 20 09/21/2020 12:00 AM     Lab Results   Component Value Date/Time    Cholesterol, total 176 12/07/2018 12:00 AM    HDL Cholesterol 59 12/07/2018 12:00 AM    LDL, calculated 97 12/07/2018 12:00 AM    VLDL, calculated 20 12/07/2018 12:00 AM    Triglyceride 99 12/07/2018 12:00 AM     Lab Results   Component Value Date/Time    TSH 1.280 09/21/2020 12:00 AM     Lab Results   Component Value Date/Time    Cholesterol, total 176 12/07/2018 12:00 AM    HDL Cholesterol 59 12/07/2018 12:00 AM    LDL, calculated 97 12/07/2018 12:00 AM    VLDL, calculated 20 12/07/2018 12:00 AM    Triglyceride 99 12/07/2018 12:00 AM     Lab Results   Component Value Date/Time    Hemoglobin A1c 5.3 12/07/2018 12:00 AM         Lab Results   Component Value Date/Time    Vitamin B12 506 04/10/2019 11:20 AM     CT ABDOMEN/PELVIS WITH CONTRAST     CPT CODE: 62894,15260     HISTORY: Right lower quadrant pain; history of hysterectomy, tubal ligation,  bowel perforation.     COMPARISON: None.     TECHNIQUE: 5 mm helical scans were obtained of the abdomen and pelvis after  uneventful administration of intravenous contrast. 100 cc of Isovue-300 was  given. Coronal and sagittal reformations. CT dose reduction was achieved through  use of a standardized protocol tailored for this examination and automatic  exposure control for dose modulation. .     FINDINGS:      The visualized lung bases are clear.      Probable area of focal fat deposition adjacent to the falciform ligament tiny  hypodensity in the lateral segment left hepatic lobe measures 3.5 mm.  Too small  to further characterize.     No splenomegaly. Homogeneous enhancement of the spleen.     Pancreas unremarkable.     Gallbladder nondilated. No appreciable stones. No intrahepatic duct dilatation.         No adrenal nodules or masses.       The kidneys enhance symmetrically. No focal lesion is identified. No  hydronephrosis.     There is no free intraperitoneal air, free fluid, or fluid collections.       No abdominal or pelvic lymphadenopathy.       Stomach is nondilated. No dilated loops of small bowel or colon. .  The appendix  does not appear inflamed.     The bladder is under distended and therefore incompletely evaluated. Uterus is  absent. Surgical clips in the right adnexa. No gross adnexal mass. Cyst of the  left ovary measures 1.5 cm with Hounsfield unit measurement of -20.      No calcifications in the abdominal aorta. No aneurysmal dilatation.     Osseous structures are intact. Minimal degenerative disc changes at L4-L5     IMPRESSION  IMPRESSION:     No CT finding for an acute intra-abdominal or pelvic process.     Incidental 3.5 mm low-attenuation left hepatic lobe lesion. Nonspecific and too  small to further characterize though statistically likely benign.     1.5 cm low-attenuation structure of the left ovary. Most likely a physiologic  cyst in a premenopausal female. Hounsfield unit measurements of potential fat  also raise possibility of a dermoid. ROS: See HPI    Physical Exam  Constitutional:       General: She is not in acute distress. Cardiovascular:      Rate and Rhythm: Normal rate and regular rhythm. Heart sounds: Normal heart sounds. Abdominal:      General: Bowel sounds are normal.      Palpations: Abdomen is soft. Comments: No point tenderness over the thoracic or lumbar spine. Generalized discomfort over the right paraspinal muscle area around the lumbar spine. Generalized discomfort over the right upper abdominal area. No rebound or guarding. No point tenderness.   Straight leg raising negative. Generalized discomfort over the right inguinal area. No palpable hernia   Neurological:      Mental Status: She is oriented to person, place, and time. ASSESSMENT and PLAN    ICD-10-CM ICD-9-CM    1. Upper abdominal pain: We will  obtain abdominal ultrasound. Does have a history of spine arthritis. Advise heating pad. Tylenol and Motrin alternatively meantime. She does have a history of fibromyalgia. Could be the part of it. We will continue symptomatic treatment. Any worsening of symptoms advised her to go to the emergency room R10.10 789.09 US ABD LTD   2. Pelvic pain: Sometime over the right side which is radiating pain from the right upper abdominal area. Will get the ultrasound. R10.2 [id number] US TRANSVAGINAL   3. Cyst of left ovary: Noted on a CAT scan in 2017. For now we will get the vaginal ultrasound for follow-up N83.202 620.2 US TRANSVAGINAL   4. Quit smoking  Z87. 891 V15.82    5. Need for pneumococcal vaccination  Z23 V03.82 PNEUMOCOCCAL POLYSACCHARIDE VACCINE, 23-VALENT, ADULT OR IMMUNOSUPPRESSED PT DOSE,   Does have anemia. On vitamin supplement. No unusual fatigue. Following rheumatology. Patient understood and agreed with plan  Agreed to take pneumonia vaccine as well. Follow-up and Dispositions    · Return in about 1 month (around 12/23/2020).

## 2020-11-23 NOTE — PROGRESS NOTES
1. Have you been to the ER, urgent care clinic since your last visit? Hospitalized since your last visit? No    2. Have you seen or consulted any other health care providers outside of the [address] [place] [address] since your last visit? Include any pap smears or colon screening.  [facility] Colonoscopy Dr. Ida Boyle - 11/19/2020    Chief Complaint   Patient presents with    Breast pain    Abdominal Pain    Side Pain

## 2020-12-02 ENCOUNTER — HOSPITAL ENCOUNTER (EMERGENCY)
Age: 50
Discharge: HOME | End: 2020-12-02
Attending: EMERGENCY MEDICINE
Payer: MEDICAID

## 2020-12-02 VITALS
TEMPERATURE: 98.4 F | HEART RATE: 66 BPM | BODY MASS INDEX: 33.49 KG/M2 | HEIGHT: 65 IN | WEIGHT: 201 LBS | OXYGEN SATURATION: 97 % | RESPIRATION RATE: 18 BRPM | DIASTOLIC BLOOD PRESSURE: 79 MMHG | SYSTOLIC BLOOD PRESSURE: 126 MMHG
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
## 2020-12-02 PROCEDURE — 99282 EMERGENCY DEPT VISIT SF MDM: CPT

## 2020-12-02 RX ORDER — NAPROXEN 500 MG/1
TABLET ORAL
Qty: 20 TAB | Refills: 0 | Status: SHIPPED | OUTPATIENT
Start: 2020-12-02 | End: 2021-03-19

## 2020-12-02 NOTE — DISCHARGE INSTRUCTIONS
Patient Education        Preventing Falls: Care Instructions  Your Care Instructions     Getting around your home safely can be a challenge if you have injuries or health problems that make it easy for you to fall. Loose rugs and furniture in walkways are among the dangers for many older people who have problems walking or who have poor eyesight. People who have conditions such as arthritis, osteoporosis, or dementia also have to be careful not to fall. You can make your home safer with a few simple measures. Follow-up care is a key part of your treatment and safety. Be sure to make and go to all appointments, and call your doctor if you are having problems. It's also a good idea to know your test results and keep a list of the medicines you take. How can you care for yourself at home? Taking care of yourself  · You may get dizzy if you do not drink enough water. To prevent dehydration, drink plenty of fluids, enough so that your urine is light yellow or clear like water. Choose water and other caffeine-free clear liquids. If you have kidney, heart, or liver disease and have to limit fluids, talk with your doctor before you increase the amount of fluids you drink. · Exercise regularly to improve your strength, muscle tone, and balance. Walk if you can. Swimming may be a good choice if you cannot walk easily. · Have your vision and hearing checked each year or any time you notice a change. If you have trouble seeing and hearing, you might not be able to avoid objects and could lose your balance. · Know the side effects of the medicines you take. Ask your doctor or pharmacist whether the medicines you take can affect your balance. Sleeping pills or sedatives can affect your balance. · Limit the amount of alcohol you drink. Alcohol can impair your balance and other senses. · Ask your doctor whether calluses or corns on your feet need to be removed.  If you wear loose-fitting shoes because of calluses or corns, you can lose your balance and fall. · Talk to your doctor if you have numbness in your feet. Preventing falls at home  · Remove raised doorway thresholds, throw rugs, and clutter. Repair loose carpet or raised areas in the floor. · Move furniture and electrical cords to keep them out of walking paths. · Use nonskid floor wax, and wipe up spills right away, especially on ceramic tile floors. · If you use a walker or cane, put rubber tips on it. If you use crutches, clean the bottoms of them regularly with an abrasive pad, such as steel wool. · Keep your house well lit, especially [place], and outside walkways. Use night-lights in areas such as hallways and bathrooms. Add extra light switches or use remote switches (such as switches that go on or off when you clap your hands) to make it easier to turn lights on if you have to get up during the night. · Install sturdy handrails on stairways. · Move items in your cabinets so that the things you use a lot are on the lower shelves (about waist level). · Keep a cordless phone and a flashlight with new batteries by your bed. If possible, put a phone in each of the main rooms of your house, or carry a cell phone in case you fall and cannot reach a phone. Or, you can wear a device around your neck or wrist. You push a button that sends a signal for help. · Wear low-heeled shoes that fit well and give your feet good support. Use footwear with nonskid soles. Check the heels and soles of your shoes for wear. Repair or replace worn heels or soles. · Do not wear socks without shoes on wood floors. · Walk on the grass when the sidewalks are slippery. If you live in an area that gets snow and ice in the winter, sprinkle salt on slippery steps and sidewalks. Preventing falls in the bath  · Install grab bars and nonskid mats inside and outside your shower or tub and near the toilet and sinks. · Use shower chairs and bath benches.   · Use a hand-held shower head that will allow you to sit while showering. · Get into a tub or shower by putting the weaker leg in first. Get out of a tub or shower with your strong side first.  · Repair loose toilet seats and consider installing a raised toilet seat to make getting on and off the toilet easier. · Keep your bathroom door unlocked while you are in the shower. Where can you learn more? Go to http://www.loredo.com/  Enter G117 in the search box to learn more about \"Preventing Falls: Care Instructions. \"  Current as of: April 15, 2020               Content Version: 12.6  © 9813-4791 [employer]. Care instructions adapted under license by [employer] (which disclaims liability or warranty for this information). If you have questions about a medical condition or this instruction, always ask your healthcare professional. [address] any warranty or liability for your use of this information.

## 2020-12-02 NOTE — ED PROVIDER NOTES
HPI patient says yesterday morning she slipped and fell down some stairs. She denies any head or neck trauma. She says she did not feel too bad yesterday but this morning when she woke up she felt \"stiff and sore all over. She has not taken any pain medication. She says her upper back is sore and symptoms are worse with movement and better with rest and heat. She denies any low back pain. No other no other specifics given at this time.     Past Medical History:   Diagnosis Date    Anemia     Arthritis     Fibroids     uterine fibroids    Psychiatric disorder     anxiety       Past Surgical History:   Procedure Laterality Date    HX HYSTERECTOMY      HX OTHER SURGICAL      surgery for punctured intestine    HX OVARIAN CYST REMOVAL Bilateral     HX TUBAL LIGATION           Family History:   Problem Relation Age of Onset    Hypertension Mother     Diabetes Mother     Breast Cancer Maternal Aunt        Social History     Socioeconomic History    Marital status: SINGLE     Spouse name: Not on file    Number of children: Not on file    Years of education: Not on file    Highest education level: Not on file   Occupational History    Not on file   Social Needs    Financial resource strain: Not on file    Food insecurity     Worry: Not on file     Inability: Not on file    Transportation needs     Medical: Not on file     Non-medical: Not on file   Tobacco Use    Smoking status: Former Smoker     Packs/day: 0.25     Types: Cigarettes    Smokeless tobacco: Never Used   Substance and Sexual Activity    Alcohol use: No    Drug use: No    Sexual activity: Not Currently     Partners: Male   Lifestyle    Physical activity     Days per week: Not on file     Minutes per session: Not on file    Stress: Not on file   Relationships    Social connections     Talks on phone: Not on file     Gets together: Not on file     Attends Zoroastrian service: Not on file     Active member of club or organization: Not on file     Attends meetings of clubs or organizations: Not on file     Relationship status: Not on file    Intimate partner violence     Fear of current or ex partner: Not on file     Emotionally abused: Not on file     Physically abused: Not on file     Forced sexual activity: Not on file   Other Topics Concern    Not on file   Social History Narrative    Not on file         ALLERGIES: Patient has no known allergies. Review of Systems   Constitutional: Negative. HENT: Negative. Respiratory: Negative. Cardiovascular: Negative. Gastrointestinal: Negative. Genitourinary: Negative. Musculoskeletal: Positive for arthralgias and back pain. Neurological: Negative. Psychiatric/Behavioral: Negative. Vitals:    12/02/20 1449   BP: 126/79   Pulse: 66   Resp: 18   Temp: 98.4 °F (36.9 °C)   SpO2: 97%   Weight: 91.2 kg (201 lb)   Height: 5' 5\" (1.651 m)            Physical Exam  Vitals signs and nursing note reviewed. Constitutional:       Appearance: She is well-developed. HENT:      Head: Normocephalic and atraumatic. Nose: Nose normal.      Mouth/Throat:      Mouth: Mucous membranes are moist.   Eyes:      Conjunctiva/sclera: Conjunctivae normal.      Pupils: Pupils are equal, round, and reactive to light. Neck:      Musculoskeletal: Normal range of motion and neck supple. Cardiovascular:      Rate and Rhythm: Normal rate and regular rhythm. Pulmonary:      Effort: Pulmonary effort is normal.      Breath sounds: Normal breath sounds. Abdominal:      Palpations: Abdomen is soft. Musculoskeletal: Normal range of motion. Skin:     General: Skin is warm and dry. Neurological:      Mental Status: She is alert and oriented to person, place, and time.           MDM       Procedures

## 2020-12-02 NOTE — ED TRIAGE NOTES
Patient states that her right knee \"gave out\" on her as she was descended the steps in her home yesterday. She states falling down the steps. States striking her head twice during the fall. Denies LOC. States dizziness immediately following the fall. She c/o pain to body from neck downward into torso.

## 2020-12-04 ENCOUNTER — OFFICE VISIT (OUTPATIENT)
Dept: FAMILY MEDICINE CLINIC | Age: 50
End: 2020-12-04
Payer: MEDICAID

## 2020-12-04 VITALS
RESPIRATION RATE: 16 BRPM | SYSTOLIC BLOOD PRESSURE: 110 MMHG | TEMPERATURE: 98 F | HEART RATE: 69 BPM | BODY MASS INDEX: 32.62 KG/M2 | HEIGHT: 66 IN | OXYGEN SATURATION: 96 % | WEIGHT: 203 LBS | DIASTOLIC BLOOD PRESSURE: 74 MMHG
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
## 2020-12-04 DIAGNOSIS — M25.561 CHRONIC PAIN OF RIGHT KNEE: ICD-10-CM

## 2020-12-04 DIAGNOSIS — M54.2 NECK PAIN: ICD-10-CM
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
## 2020-12-04 DIAGNOSIS — G89.29 CHRONIC PAIN OF RIGHT KNEE: ICD-10-CM

## 2020-12-04 PROCEDURE — 99213 OFFICE O/P EST LOW 20 MIN: CPT | Performed by: FAMILY MEDICINE

## 2020-12-04 RX ORDER — SERTRALINE HYDROCHLORIDE 100 MG/1
TABLET, FILM COATED ORAL
COMMUNITY
End: 2021-04-02 | Stop reason: SDUPTHER

## 2020-12-04 NOTE — PROGRESS NOTES
HISTORY OF PRESENT ILLNESS  Jacques Haider is a 52 y.o. female. HPI: Here for follow-up. Recent fall 2 days ago while coming down from the stairs to get the water. Said felt suddenly numbness in the right hand which she has chronically from carpal tunnel and her right feet got twisted and knee gave up and she fell 15-16 stairs down. Said since then she is having a neck pain and some pain and discomfort over the right knee. She did not have any neck pain before fall. Currently it is 8 x 10 intensity. She went to emergency room and she was sent home with the symptomatic treatment. She had a brief episode of headache yesterday but resolved on its own. No vision change or loss of consciousness. No nausea or vomiting. No extremity weakness. No loss of urine or bowel control. Did not have any dizziness. No abdominal pain. No urine or bowel complaints. During visit she is sitting comfortable and did not appear in any acute distress. Currently not taken any pain medication. Denies any chest pain or shortness of breath. No cold cough wheezing or any fever. No diaphoresis or palpitation. No appetite or weight changes. No mood changes. No anxiety. Noted vitals been stable. Said she has a chronic right knee pain. Seen Dr. Erwin Guzman few months ago. currently advised to have a follow-up with Dr. Erwin Guzman again. Contact number was given to the patient. Review ER records from the chart. Visit Vitals  /74 (BP 1 Location: Left arm, BP Patient Position: Sitting)   Pulse 69   Temp 98 °F (36.7 °C) (Oral)   Resp 16   Ht 5' 6\" (1.676 m)   Wt 203 lb (92.1 kg)   SpO2 96%   BMI 32.77 kg/m²     Does have a history of fibromyalgia and multiple joint pain chronically. Following rheumatologist.  Has coming up appointment. ROS: See HPI    Physical Exam  Musculoskeletal:      Comments: Generalized tenderness over the cervical spine and bilateral paraspinal muscle area.   Generalized tenderness bilaterally over the trapezius muscle. Range of motion within normal limit but discomfort with every movement. Right knee. No swelling or redness. Range of motion within normal limit. No effusion at this time. Generalized tenderness over the right knee   Neurological:      General: No focal deficit present. Mental Status: She is alert and oriented to person, place, and time. Psychiatric:         Behavior: Behavior normal.         ASSESSMENT and PLAN    ICD-10-CM ICD-9-CM    1. Fall, subsequent encounter: Since fall having a neck pain and worsening of right knee pain. Sending back to orthopedic for right knee pain evaluation. For possible injection. As she had no neck pain and now she has been feeling pain since fall we will obtain the neck x-ray and a right knee x-ray. Symptomatic treatment with a heating pad over the neck and ice over the right knee. Tylenol and Motrin alternatively get pain with food. Advised to go to the emergency room with any red flag sign and worsening of the current clinical symptoms. She understood and agreed with the plan W19. XXXD V58.89 XR SPINE CERV PA LAT ODONT 3 V MAX     E888.9    2. Neck pain  M54.2 723.1 XR SPINE CERV PA LAT ODONT 3 V MAX   3. Chronic pain of right knee  M25.561 719.46 XR KNEE RT MAX 2 VWS    G89.29 338.29    Patient understood and agreed with the plan  Follow-up and Dispositions    · Return in about 1 month (around 1/4/2021).

## 2020-12-04 NOTE — PATIENT INSTRUCTIONS
Neck Pain: Care Instructions  Your Care Instructions     You can have neck pain anywhere from the bottom of your head to the top of your shoulders. It can spread to the upper back or arms. Injuries, painting a ceiling, sleeping with your neck twisted, staying in one position for too long, and many other activities can cause neck pain. Most neck pain gets better with home care. Your doctor may recommend medicine to relieve pain or relax your muscles. He or she may suggest exercise and physical therapy to increase flexibility and relieve stress. You may need to wear a special (cervical) collar to support your neck for a day or two. Follow-up care is a key part of your treatment and safety. Be sure to make and go to all appointments, and call your doctor if you are having problems. It's also a good idea to know your test results and keep a list of the medicines you take. How can you care for yourself at home? · Try using a heating pad on a low or medium setting for 15 to 20 minutes every 2 or 3 hours. Try a warm shower in place of one session with the heating pad. · You can also try an ice pack for 10 to 15 minutes every 2 to 3 hours. Put a thin cloth between the ice and your skin. · Take pain medicines exactly as directed. ? If the doctor gave you a prescription medicine for pain, take it as prescribed. ? If you are not taking a prescription pain medicine, ask your doctor if you can take an over-the-counter medicine. · If your doctor recommends a cervical collar, wear it exactly as directed. When should you call for help? Call your doctor now or seek immediate medical care if:    · You have new or worsening numbness in your arms, buttocks or legs.     · You have new or worsening weakness in your arms or legs. (This could make it hard to stand up.)     · You lose control of your bladder or bowels.    Watch closely for changes in your health, and be sure to contact your doctor if:    · Your neck pain is getting worse.     · You are not getting better after 1 week.     · You do not get better as expected. Where can you learn more? Go to http://www.[employer].com/  Enter Z021 in the search box to learn more about \"Neck Pain: Care Instructions. \"  Current as of: March 2, 2020               Content Version: 12.6  © 2006-2020 [employer]. Care instructions adapted under license by [employer] (which disclaims liability or warranty for this information). If you have questions about a medical condition or this instruction, always ask your healthcare professional. [place] [address] any warranty or liability for your use of this information. Knee Pain or Injury: Care Instructions  Your Care Instructions     Injuries are a common cause of knee problems. Sudden (acute) injuries may be caused by a direct blow to the knee. They can also be caused by abnormal twisting, bending, or falling on the knee. Pain, bruising, or swelling may be severe, and may start within minutes of the injury. Overuse is another cause of knee pain. Other causes are climbing stairs, kneeling, and other activities that use the knee. Everyday wear and tear, especially as you get older, also can cause knee pain. Rest, along with home treatment, often relieves pain and allows your knee to heal. If you have a serious knee injury, you may need tests and treatment. Follow-up care is a key part of your treatment and safety. Be sure to make and go to all appointments, and call your doctor if you are having problems. It's also a good idea to know your test results and keep a list of the medicines you take. How can you care for yourself at home? · Be safe with medicines. Read and follow all instructions on the label. ? If the doctor gave you a prescription medicine for pain, take it as prescribed.   ? If you are not taking a prescription pain medicine, ask your doctor if you can take an over-the-counter medicine. · Rest and protect your knee. Take a break from any activity that may cause pain. · Put ice or a cold pack on your knee for 10 to 20 minutes at a time. Put a thin cloth between the ice and your skin. · Prop up a sore knee on a pillow when you ice it or anytime you sit or lie down for the next 3 days. Try to keep it above the level of your heart. This will help reduce swelling. · If your knee is not swollen, you can put moist heat, a heating pad, or a warm cloth on your knee. · If your doctor recommends an elastic bandage, sleeve, or other type of support for your knee, wear it as directed. · Follow your doctor's instructions about how much weight you can put on your leg. Use a cane, crutches, or a walker as instructed. · Follow your doctor's instructions about activity during your healing process. If you can do mild exercise, slowly increase your activity. · Reach and stay at a healthy weight. Extra weight can strain the joints, especially the knees and hips, and make the pain worse. Losing even a few pounds may help. When should you call for help? Call 911 anytime you think you may need emergency care. For example, call if:    · You have symptoms of a blood clot in your lung (called a pulmonary embolism). These may include:  ? Sudden chest pain. ? Trouble breathing. ? Coughing up blood. Call your doctor now or seek immediate medical care if:    · You have severe or increasing pain.     · Your leg or foot turns cold or changes color.     · You cannot stand or put weight on your knee.     · Your knee looks twisted or bent out of shape.     · You cannot move your knee.     · You have signs of infection, such as:  ? Increased pain, swelling, warmth, or redness. ? Red streaks leading from the knee. ? Pus draining from a place on your knee.   ? A fever.     · You have signs of a blood clot in your leg (called a deep vein thrombosis), such as:  ? Pain in your calf, back of the knee, thigh, or groin. ? Redness and swelling in your leg or groin. Watch closely for changes in your health, and be sure to contact your doctor if:    · You have tingling, weakness, or numbness in your knee.     · You have any new symptoms, such as swelling.     · You have bruises from a knee injury that last longer than 2 weeks.     · You do not get better as expected. Where can you learn more? Go to http://www.gray.com/  Enter K195 in the search box to learn more about \"Knee Pain or Injury: Care Instructions. \"  Current as of: June 26, 2019               Content Version: 12.6  © 8623-8215 [employer]. Care instructions adapted under license by [employer] (which disclaims liability or warranty for this information). If you have questions about a medical condition or this instruction, always ask your healthcare professional. [address] any warranty or liability for your use of this information.

## 2020-12-04 NOTE — PROGRESS NOTES
Chief Complaint   Patient presents with    Fall    Neck Pain    Shoulder Pain    Back Pain    Buttocks pain

## 2020-12-05 ENCOUNTER — HOSPITAL ENCOUNTER (OUTPATIENT)
Dept: GENERAL RADIOLOGY | Age: 50
Discharge: HOME | End: 2020-12-05
Payer: MEDICAID

## 2020-12-05 PROCEDURE — 72040 X-RAY EXAM NECK SPINE 2-3 VW: CPT

## 2020-12-05 PROCEDURE — 73560 X-RAY EXAM OF KNEE 1 OR 2: CPT

## 2020-12-07 NOTE — PROGRESS NOTES
Mild degenerative disease.  If no improvement in pain will consider physical therapy or spine specialist.

## 2020-12-07 NOTE — PROGRESS NOTES
Contacted patient and verified identity using name and date of birth (2- identifiers)  Spoke with patient and she verbalized understanding of Osteoarthritis.  Please advise to make an appt with ortho

## 2020-12-07 NOTE — PROGRESS NOTES
Contacted patient and verified identity using name and date of birth (2- identifiers)  Spoke with patient and she verbalized understanding of Mild degenerative disease.  If no improvement in pain will consider physical therapy or spine specialist.

## 2020-12-29 ENCOUNTER — HOSPITAL ENCOUNTER (OUTPATIENT)
Dept: ULTRASOUND IMAGING | Age: 50
Discharge: HOME | End: 2020-12-29
Attending: FAMILY MEDICINE
Payer: MEDICAID

## 2020-12-29 DIAGNOSIS — R10.2 PELVIC PAIN: ICD-10-CM

## 2020-12-29 DIAGNOSIS — N83.202 CYST OF LEFT OVARY: ICD-10-CM

## 2020-12-29 PROCEDURE — 93975 VASCULAR STUDY: CPT

## 2020-12-29 PROCEDURE — 76705 ECHO EXAM OF ABDOMEN: CPT

## 2020-12-30 NOTE — PROGRESS NOTES
Transvaginal ultrasound showed no acute findings. Left ovarian functional cyst. No adnexal mass. Further discussion on follow up visit.

## 2020-12-30 NOTE — PROGRESS NOTES
Fatty liver changes. Previously seen hepatic cyst not seen at this time per radiology. Further discussion on follow up visit.  If pain persist advise to make a follow up visit with GI

## 2021-01-05 ENCOUNTER — OFFICE VISIT (OUTPATIENT)
Dept: FAMILY MEDICINE CLINIC | Age: 51
End: 2021-01-05
Payer: MEDICAID

## 2021-01-05 VITALS
WEIGHT: 200 LBS | HEIGHT: 65 IN | HEART RATE: 72 BPM | BODY MASS INDEX: 33.32 KG/M2 | DIASTOLIC BLOOD PRESSURE: 80 MMHG | OXYGEN SATURATION: 98 % | SYSTOLIC BLOOD PRESSURE: 128 MMHG | TEMPERATURE: 98.1 F | RESPIRATION RATE: 16 BRPM

## 2021-01-05 DIAGNOSIS — M54.2 NECK PAIN: Primary | ICD-10-CM

## 2021-01-05 DIAGNOSIS — M62.830 SPASM OF BACK MUSCLES: ICD-10-CM

## 2021-01-05 DIAGNOSIS — M25.561 RIGHT KNEE PAIN, UNSPECIFIED CHRONICITY: ICD-10-CM

## 2021-01-05 PROCEDURE — 99213 OFFICE O/P EST LOW 20 MIN: CPT | Performed by: FAMILY MEDICINE

## 2021-01-05 RX ORDER — ERGOCALCIFEROL 1.25 MG/1
50000 CAPSULE ORAL
COMMUNITY
Start: 2020-12-30

## 2021-01-05 RX ORDER — MELOXICAM 15 MG/1
15 TABLET ORAL AS NEEDED
COMMUNITY
End: 2022-06-14

## 2021-01-05 NOTE — PATIENT INSTRUCTIONS
Musculoskeletal Pain: Care Instructions Your Care Instructions Different problems with the bones, muscles, nerves, ligaments, and tendons in the body can cause pain. One or more areas of your body may ache or burn. Or they may feel tired, stiff, or sore. The medical term for this type of pain is musculoskeletal pain. It can have many different causes. Sometimes the pain is caused by an injury such as a strain or sprain. Or you might have pain from using one part of your body in the same way over and over again. This is called overuse. In some cases, the cause of the pain is another health problem such as arthritis or fibromyalgia. The doctor will examine you and ask you questions about your health to help find the cause of your pain. Blood tests or imaging tests like an X-ray may also be helpful. But sometimes doctors can't find a cause of the pain. Treatment depends on your symptoms and the cause of the pain, if known. The doctor has checked you carefully, but problems can develop later. If you notice any problems or new symptoms, get medical treatment right away. Follow-up care is a key part of your treatment and safety. Be sure to make and go to all appointments, and call your doctor if you are having problems. It's also a good idea to know your test results and keep a list of the medicines you take. How can you care for yourself at home? · Rest until you feel better. · Do not do anything that makes the pain worse. Return to exercise gradually if you feel better and your doctor says it's okay. · Be safe with medicines. Read and follow all instructions on the label. ? If the doctor gave you a prescription medicine for pain, take it as prescribed. ? If you are not taking a prescription pain medicine, ask your doctor if you can take an over-the-counter medicine. · Put ice or a cold pack on the area for 10 to 20 minutes at a time to ease pain. Put a thin cloth between the ice and your skin. When should you call for help? Call your doctor now or seek immediate medical care if: 
  · You have new pain, or your pain gets worse.  
  · You have new symptoms such as a fever, a rash, or chills. Watch closely for changes in your health, and be sure to contact your doctor if: 
  · You do not get better as expected. Where can you learn more? Go to http://www.gray.com/ Enter G063 in the search box to learn more about \"Musculoskeletal Pain: Care Instructions. \" Current as of: November 20, 2019               Content Version: 12.6 © 5721-3300 [employer]. Care instructions adapted under license by [employer] (which disclaims liability or warranty for this information). If you have questions about a medical condition or this instruction, always ask your healthcare professional. [address] any warranty or liability for your use of this information.

## 2021-01-05 NOTE — PROGRESS NOTES
Chief Complaint   Patient presents with    Knee Pain     no improvement    Neck Pain     resolved    Spasms     back spasms during sleep pattern and sittig up in her bed       1. Have you been to the ER, urgent care clinic since your last visit? Hospitalized since your last visit? No    2. Have you seen or consulted any other health care providers outside of the [address] [place] [address] since your last visit? Include any pap smears or colon screening.  Endocrine- Dr. Ramiro Ferguson, [address] (Care Teams updated)    Called and requested last office note from Dr. Lanie Chong

## 2021-01-05 NOTE — PROGRESS NOTES
HISTORY OF PRESENT ILLNESS  Diann Orozco is a 52 y.o. female. HPI: Here for follow-up. She had a fall and since then she had a neck pain and right knee pain. Currently neck and knee pain has been improved significantly. On symptomatic treatment. Currently she is since last week she has been feeling lower back spasm over the right side. Localized. No radiation of pain. No fall or injury. No lower extremity tingling numbness or weakness. No loss of urine or bowel control. Said spasm comes randomly and last for very short time and disappear on its own. During the visit she was sitting comfortable and did not appear in any acute distress. She has been taking naproxen which is helping some. Not tried any other medication for current ongoing problem. Visit Vitals  /80 (BP 1 Location: Left arm, BP Patient Position: Sitting)   Pulse 72   Temp 98.1 °F (36.7 °C) (Oral)   Resp 16   Ht 5' 5\" (1.651 m)   Wt 200 lb (90.7 kg)   SpO2 98%   BMI 33.28 kg/m²     Denies any headache, dizziness, no chest pain or trouble breathing, no arm or leg weakness. No nausea or vomiting, no weight or appetite changes, no mood changes . No urine or bowel complains, no palpitation, no diaphoresis. No abdominal pain. No cold or cough. No leg swelling. No fever. No sleep trouble. ROS: See HPI  Physical Exam  Musculoskeletal:      Comments: Generalized discomfort over the right paraspinal muscle area around the lumbar spine. No point tenderness on lumbar spine. Range of motion: Discomfort while bending down more than 40 degree. Neurological:      Mental Status: She is alert and oriented to person, place, and time. Psychiatric:         Behavior: Behavior normal.         Thought Content: Thought content normal.         ASSESSMENT and PLAN    ICD-10-CM ICD-9-CM    1. Neck pain: Has improved at this time. Will observe M54.2 723.1    2.  Right knee pain, unspecified chronicity: Has been improved at this time will observe M25.561 719.46    3. Spasm of back muscles: Mainly over the right side. Since 1 week. For now will observe and treat symptomatically with a heating pad and NSAID alternate with the Tylenol. If no improvement in 2 to 3 weeks advised patient to call back and we will send her for physical therapy. Patient understood and agreed with the plan M62.830 724.8     rt side. since christmas     Patient understood and agreed with the plan  Follow-up and Dispositions    · Return in about 3 months (around 4/5/2021).

## 2021-01-25 ENCOUNTER — HOSPITAL ENCOUNTER (OUTPATIENT)
Dept: MAMMOGRAPHY | Age: 51
Discharge: HOME | End: 2021-01-25
Attending: FAMILY MEDICINE
Payer: MEDICAID

## 2021-01-25 DIAGNOSIS — Z12.31 ENCOUNTER FOR SCREENING MAMMOGRAM FOR BREAST CANCER: ICD-10-CM

## 2021-01-25 PROCEDURE — 77067 SCR MAMMO BI INCL CAD: CPT

## 2021-01-25 PROCEDURE — 77063 BREAST TOMOSYNTHESIS BI: CPT

## 2021-02-07 DIAGNOSIS — F17.200 SMOKING: ICD-10-CM

## 2021-02-08 RX ORDER — IBUPROFEN 200 MG
TABLET ORAL
Qty: 28 PATCH | Refills: 1 | Status: SHIPPED | OUTPATIENT
Start: 2021-02-08 | End: 2021-03-19

## 2021-02-10 DIAGNOSIS — R00.2 PALPITATION: Primary | ICD-10-CM

## 2021-03-18 ENCOUNTER — HOSPITAL ENCOUNTER (OUTPATIENT)
Dept: ULTRASOUND IMAGING | Age: 51
Discharge: HOME | End: 2021-03-18
Attending: NURSE PRACTITIONER
Payer: MEDICAID

## 2021-03-18 DIAGNOSIS — R31.29 MICROSCOPIC HEMATURIA: ICD-10-CM

## 2021-03-18 PROCEDURE — 76770 US EXAM ABDO BACK WALL COMP: CPT

## 2021-03-19 ENCOUNTER — OFFICE VISIT (OUTPATIENT)
Dept: CARDIOLOGY CLINIC | Age: 51
End: 2021-03-19
Payer: MEDICAID

## 2021-03-19 VITALS
BODY MASS INDEX: 33.99 KG/M2 | SYSTOLIC BLOOD PRESSURE: 122 MMHG | DIASTOLIC BLOOD PRESSURE: 80 MMHG | OXYGEN SATURATION: 98 % | HEIGHT: 65 IN | WEIGHT: 204 LBS | HEART RATE: 71 BPM

## 2021-03-19 DIAGNOSIS — R07.9 CHEST PAIN, UNSPECIFIED TYPE: Primary | ICD-10-CM

## 2021-03-19 DIAGNOSIS — R07.9 CHEST PAIN, UNSPECIFIED TYPE: ICD-10-CM

## 2021-03-19 DIAGNOSIS — I27.20 PULMONARY HTN (HCC): ICD-10-CM

## 2021-03-19 DIAGNOSIS — R06.09 DOE (DYSPNEA ON EXERTION): ICD-10-CM

## 2021-03-19 PROCEDURE — 93000 ELECTROCARDIOGRAM COMPLETE: CPT | Performed by: INTERNAL MEDICINE

## 2021-03-19 PROCEDURE — 99204 OFFICE O/P NEW MOD 45 MIN: CPT | Performed by: INTERNAL MEDICINE

## 2021-03-19 RX ORDER — ASPIRIN 81 MG/1
81 TABLET ORAL DAILY
Qty: 30 TAB | Refills: 5 | Status: SHIPPED | OUTPATIENT
Start: 2021-03-19 | End: 2022-05-23

## 2021-03-19 RX ORDER — NITROGLYCERIN 0.4 MG/1
0.4 TABLET SUBLINGUAL
Qty: 1 BOTTLE | Refills: 5 | Status: SHIPPED | OUTPATIENT
Start: 2021-03-19 | End: 2021-09-27

## 2021-03-19 NOTE — PATIENT INSTRUCTIONS
Echo 
Exercise nuc Event montior Start aspirin 81mg Start nitroglycerin Follow up with Dr. Perry Hope in 6-8 week after testing done

## 2021-03-19 NOTE — LETTER
3/19/2021 Patient: Ashley Cali YOB: 1970 Date of Visit: 3/19/2021 Mic Garcia MD 
[address] [place] [address] [address] [id number] [place] [address] [address] [employer] Dear Mic Garcia MD, Thank you for referring Ms. Tracy Smith to [address] [place] [address] for evaluation. My notes for this consultation are attached. If you have questions, please do not hesitate to call me. I look forward to following your patient along with you. Sincerely, Corey Gilmore MD

## 2021-03-19 NOTE — PROGRESS NOTES
[facility]    Ms. Shahnaz Cavanaugh is 53-year-old female with history of anemia, arthritis, fibromyalgia    Patient is here today for initial cardiac evaluation. She denies any prior history of microinfarction or congestive heart failure. Patient has been experiencing some fluttering sensation in the chest on and off for last 2 months. Fluttering sensation comes randomly and feels like it is racing. It last for few minutes. There is no associated nausea vomiting diaphoresis. She denies presyncope or syncope. She also has been experiencing some sharp stabbing chest discomfort which is happening randomly without any associated symptoms of nausea vomiting or diaphoresis. This usually last anywhere from 3 to 5 minutes. This discomfort radiates sometimes to both shoulder. There is no exertional chest pain or chest tightness. She does have exertional dyspnea for almost up to 2 years. She said that she could probably walk about 10 minutes and she would get short of breath and she has to stop. In terms of distance, she says she cannot walk more than 2 or 3 blocks without getting short of breath and having to stop. She denies PND or lower extremity swelling  Denies any nausea, vomiting, abdominal pain, fever, chills, sputum production. No hematuria or other bleeding complaints    Past Medical History:   Diagnosis Date    Anemia     Arthritis     Fibroids     uterine fibroids    Fibromyalgia     Menorrhagia     Psychiatric disorder     anxiety    Tobacco abuse     quit in 2020       Review of Systems:  Cardiac symptoms as noted above in HPI. All others negative.      Current Outpatient Medications   Medication Sig    ergocalciferol (ERGOCALCIFEROL) 1,250 mcg (50,000 unit) capsule TAKE 1 CAPSULE BY MOUTH ONE TIME PER WEEK    meloxicam (MOBIC) 15 mg tablet meloxicam 15 mg tablet    sertraline (ZOLOFT) 100 mg tablet sertraline 100 mg tablet  estradioL (ESTRACE) 0.5 mg tablet Take 1 Tab by mouth daily.  traZODone (DESYREL) 100 mg tablet Take 1 Tab by mouth daily. No current facility-administered medications for this visit. Past Surgical History:   Procedure Laterality Date    HX HYSTERECTOMY      HX OTHER SURGICAL      surgery for punctured intestine    HX OVARIAN CYST REMOVAL Bilateral     HX TUBAL LIGATION         Allergies and Sensitivities:  No Known Allergies    Family History:  Family History   Problem Relation Age of Onset    Hypertension Mother     Diabetes Mother     Breast Cancer Maternal Aunt        Social History:  Social History     Tobacco Use    Smoking status: Former Smoker     Packs/day: 0.25     Types: Cigarettes     Quit date:      Years since quittin.2    Smokeless tobacco: Never Used   Substance Use Topics    Alcohol use: No    Drug use: No     She  reports that she quit smoking about 7 years ago. Her smoking use included cigarettes. She smoked 0.25 packs per day. She has never used smokeless tobacco.  She  reports no history of alcohol use. Physical Exam:  BP Readings from Last 3 Encounters:   21 122/80   21 128/80   20 110/74         Pulse Readings from Last 3 Encounters:   21 71   21 72   20 69          Wt Readings from Last 3 Encounters:   21 92.5 kg (204 lb)   02/10/21 89.8 kg (198 lb)   21 90.7 kg (200 lb)       Constitutional: Oriented to person, place, and time. HENT: Head: Normocephalic and atraumatic. Eyes: Conjunctivae and extraocular motions are normal.   Neck: No JVD present. Carotid bruit is not appreciated. Cardiovascular: Regular rhythm. No murmur, gallop or rubs appreciated  Lung: Breath sounds normal. No respiratory distress. No ronchi or rales appreciated  Abdominal: No tenderness. No rebound and no guarding. Musculoskeletal: There is no lower extremity edema.  No cynosis  Lymphadenopathy:  No cervical or supraclavicular adenopathy appriciated. Neurological: No gross motor deficit noted. Skin: No visible skin rash noted. No Ear discharge noted  Psychiatric: Normal mood and affect. LABS:   @  Lab Results   Component Value Date/Time    WBC 7.0 09/21/2020 12:00 AM    HGB 10.7 (L) 09/21/2020 12:00 AM    HCT 32.6 (L) 09/21/2020 12:00 AM    PLATELET 119 28/55/5659 12:00 AM    MCV 86 09/21/2020 12:00 AM     Lab Results   Component Value Date/Time    Sodium 141 09/21/2020 12:00 AM    Potassium 4.0 09/21/2020 12:00 AM    Chloride 101 09/21/2020 12:00 AM    CO2 23 09/21/2020 12:00 AM    Glucose 106 (H) 09/21/2020 12:00 AM    BUN 11 09/21/2020 12:00 AM    Creatinine 0.76 09/21/2020 12:00 AM     Lipids Latest Ref Rng & Units 12/7/2018   Chol, Total 100 - 199 mg/dL 176   HDL >39 mg/dL 59   LDL 0 - 99 mg/dL 97   Trig 0 - 149 mg/dL 99   Some recent data might be hidden     Lab Results   Component Value Date/Time    ALT (SGPT) 16 09/21/2020 12:00 AM     Lab Results   Component Value Date/Time    Hemoglobin A1c 5.3 12/07/2018 12:00 AM     Lab Results   Component Value Date/Time    TSH 1.280 09/21/2020 12:00 AM       EKG  Sinus rhythm at 71 bpm.  No pathologic Q waves. Nonspecific T wave changes. ECHO    STRESS TEST (EST, PHARM, NUC, ECHO etc)    CATHETERIZATION    IMPRESSION & PLAN:  Ms. Marycruz Rucker is a 51-year-old female with fibromyalgia, arthritis    Chest pain/dyspnea:  Patient has been having chest pain on and off for last 2 months. Patient also has dyspnea on exertion for last up to 2 years  Angina: Cannot be completely ruled out  Recommend nuclear stress test to rule out ischemia  Echocardiogram to rule out structural abnormality of the heart or pulmonary hypertension  Start taking aspirin 81 mg daily. Will provide patient sublingual nitroglycerin for as needed use    Palpitation:  Without any presyncope or syncope. Importance of diet and exercise was discussed with patient.   TSH in 2019 in 2020, normal  Denies any excessive caffeine intake  We will place event monitor to rule out any underlying arrhythmia    This plan was discussed with patient who is in agreement. Thank you for allowing me to participate in patient care. Please feel free to call me if you have any question or concern. Yonas Augustin MD  Please note: This document has been produced using voice recognition software. Unrecognized errors in transcription may be present.

## 2021-03-19 NOTE — PROGRESS NOTES
Lanny Falcon presents today for   Chief Complaint   Patient presents with    New Patient     referred by pcp     Chest Pain     sharp pain in the center of her chest can last up to 3 mins off and on     Palpitations     flutters when the pain comes on     Dizziness     on exertion or standing too fast        Lanny Falcon preferred language for health care discussion is english/other. Is someone accompanying this pt? no    Is the patient using any DME equipment during [address]? no    Depression Screening:  3 most recent PHQ Screens 3/19/2021   PHQ Not Done -   Little interest or pleasure in doing things Not at all   Feeling down, depressed, irritable, or hopeless Not at all   Total Score PHQ 2 0   Trouble falling or staying asleep, or sleeping too much -   Feeling tired or having little energy -   Poor appetite, weight loss, or overeating -   Feeling bad about yourself - or that you are a failure or have let yourself or your family down -   Trouble concentrating on things such as school, work, reading, or watching TV -   Moving or speaking so slowly that other people could have noticed; or the opposite being so fidgety that others notice -   Thoughts of being better off dead, or hurting yourself in some way -   PHQ 9 Score -   How difficult have these problems made it for you to do your work, take care of your home and get along with others -       Learning Assessment:  Learning Assessment 1/14/2020   PRIMARY LEARNER Patient   HIGHEST LEVEL OF EDUCATION - PRIMARY LEARNER  DID NOT GRADUATE HIGH SCHOOL   BARRIERS PRIMARY LEARNER NONE   CO-LEARNER CAREGIVER No   PRIMARY LANGUAGE ENGLISH    NEED No   LEARNER PREFERENCE PRIMARY DEMONSTRATION   LEARNING SPECIAL TOPICS No   ANSWERED BY patient   RELATIONSHIP SELF       Abuse Screening:  Abuse Screening Questionnaire 3/19/2021   Do you ever feel afraid of your partner? N   Are you in a relationship with someone who physically or mentally threatens you?  Akira Pagan Is it safe for you to go home? Y       Fall Risk  No flowsheet data found. Pt currently taking Anticoagulant therapy? no    Coordination of Care:  1. Have you been to the ER, urgent care clinic since your last visit? Hospitalized since your last visit? no    2. Have you seen or consulted any other health care providers outside of the [address] [place] [address] since your last visit? Include any pap smears or colon screening.  no

## 2021-03-22 ENCOUNTER — TELEPHONE (OUTPATIENT)
Dept: CARDIOLOGY CLINIC | Age: 51
End: 2021-03-22

## 2021-03-22 NOTE — TELEPHONE ENCOUNTER
Contacted pt at [facility] number. Two patient Identifiers confirmed. Advised pt order is still processing and has not been assigned yet. Gave Christiano [id number] # for reference. Pt verbalized understanding.

## 2021-03-22 NOTE — TELEPHONE ENCOUNTER
Patient called regarding cardiac event monitor. Patient would like to know if there is a tracking number for that shipment.

## 2021-04-02 ENCOUNTER — PATIENT MESSAGE (OUTPATIENT)
Dept: FAMILY MEDICINE CLINIC | Age: 51
End: 2021-04-02

## 2021-04-02 DIAGNOSIS — F33.2 SEVERE EPISODE OF RECURRENT MAJOR DEPRESSIVE DISORDER, WITHOUT PSYCHOTIC FEATURES (HCC): ICD-10-CM

## 2021-04-02 RX ORDER — SERTRALINE HYDROCHLORIDE 100 MG/1
TABLET, FILM COATED ORAL
Qty: 30 TAB | Refills: 1 | Status: SHIPPED | OUTPATIENT
Start: 2021-04-02 | End: 2021-04-08 | Stop reason: SDUPTHER

## 2021-04-02 RX ORDER — TRAZODONE HYDROCHLORIDE 100 MG/1
100 TABLET ORAL DAILY
Qty: 30 TAB | Refills: 1 | Status: SHIPPED | OUTPATIENT
Start: 2021-04-02 | End: 2021-04-09 | Stop reason: SDUPTHER

## 2021-04-02 NOTE — TELEPHONE ENCOUNTER
Per Dr. Cristela Pena, Zoloft and Trazadone sent to pharmacy.  Called patient and she verbalized understanding of new Rxs and she is in process of finding new Psychiatrist

## 2021-04-02 NOTE — TELEPHONE ENCOUNTER
Marcos Chacon MD 4/2/2021 2:58 PM EDT    Please call in prescription. For Trazodone and Sertrailin as indicated in patient message and pleSe send a new referral for psychiatry to [facility] place. You can pens the order and I can co-sign. Thanks  ----- Message -----  From: Eliu Magallanes LPN  Sent: 7/9/2622 7:60 PM EDT  To: Migue Segura MD  Subject: FW: Prescription Question       ----- Message -----  From: John Monsalve  Sent: 4/2/2021 2:22 PM EDT  To: [facility] Nurse [facility]  Subject: Prescription Question     My therapist Ms. Isiah Herman with [facility] drop me as a client because I missed two appointments via telehealth, it was not done on purpose I stated to her from our 1st visit in person before Covid that I have Fibromyalgia which causes brain fog meaning I forget things more often than I should if she could give me a call right before our sessions to remind me. She stated that she's no  so she got mad and dropped me. [facility] pharmacy is trying to get a refill approval from Kenny Guevara who is the Medication Nurse/Tech with [facility] but was unable to for a week now. I guess since Isiah Herman my therapist dropped me and they being with the same company I can't get my Trazadone 100mg tablet to help me sleep at night and Sertraline NURY 100mg for my depression and anxiety. Is it possible that you can call in a refill for both please for I'm all over the place. And Cynthia is helping me to find another therapist but I need those refills until I get a new therapist that can prescribe my meds.  Thank you

## 2021-04-08 RX ORDER — SERTRALINE HYDROCHLORIDE 100 MG/1
TABLET, FILM COATED ORAL
Qty: 30 TAB | Refills: 1 | Status: SHIPPED | OUTPATIENT
Start: 2021-04-08 | End: 2021-07-08

## 2021-04-09 ENCOUNTER — VIRTUAL VISIT (OUTPATIENT)
Dept: FAMILY MEDICINE CLINIC | Age: 51
End: 2021-04-09
Payer: MEDICAID

## 2021-04-09 DIAGNOSIS — G47.9 TROUBLE IN SLEEPING: ICD-10-CM

## 2021-04-09 DIAGNOSIS — D50.8 OTHER IRON DEFICIENCY ANEMIA: ICD-10-CM

## 2021-04-09 DIAGNOSIS — R31.29 MICROSCOPIC HEMATURIA: ICD-10-CM

## 2021-04-09 DIAGNOSIS — F32.89 OTHER DEPRESSION: ICD-10-CM

## 2021-04-09 DIAGNOSIS — Z13.220 SCREENING FOR HYPERLIPIDEMIA: ICD-10-CM

## 2021-04-09 DIAGNOSIS — M25.50 MULTIPLE JOINT PAIN: Primary | ICD-10-CM

## 2021-04-09 DIAGNOSIS — M25.50 MULTIPLE JOINT PAIN: ICD-10-CM

## 2021-04-09 DIAGNOSIS — R00.2 PALPITATION: ICD-10-CM

## 2021-04-09 DIAGNOSIS — E55.9 VITAMIN D DEFICIENCY: ICD-10-CM

## 2021-04-09 DIAGNOSIS — R07.89 OTHER CHEST PAIN: ICD-10-CM

## 2021-04-09 PROCEDURE — 99214 OFFICE O/P EST MOD 30 MIN: CPT | Performed by: FAMILY MEDICINE

## 2021-04-09 RX ORDER — TRAZODONE HYDROCHLORIDE 100 MG/1
100 TABLET ORAL DAILY
Qty: 30 TAB | Refills: 1 | Status: SHIPPED | OUTPATIENT
Start: 2021-04-09 | End: 2022-06-14 | Stop reason: SDUPTHER

## 2021-04-09 NOTE — PROGRESS NOTES
Keyur Echeverria is a 52 y.o. female who was seen by synchronous (real-time) audio-video technology on 4/9/2021 for Depression and Other (Multiple side pain)        Assessment & Plan:   Diagnoses and all orders for this visit:    Multiple joint pain: Last visit had neck pain, lower back pain, right knee pain. It has been stable on symptomatic treatment. Will observe  Comments:  neck,  back and rt knee pain. stable. Orders:  -     METABOLIC PANEL, COMPREHENSIVE; Future    Other chest pain: Seen cardiology. Also had a palpitation. She has been wearing Holter at this time. Scheduled for nuclear stress test and echo. Will be following cardiology recommendation. Reviewed the notes and recommendation with the patient    Palpitation  -     TSH 3RD GENERATION; Future    Microscopic hematuria: Has seen urology. No infection on culture. Now scheduled for cystoscopy. Reviewed their notes and recommendation. Advised patient to keep the follow-up appointment  Comments:  plan for cystoscopy     Other depression: Was seeing psychiatry. Missed follow-up appointment. Was discharged from the practice. Given new list of specialist to make an appointment. She has already put the call to them. Meantime we will continue feeling of Zoloft and trazodone as it has been helping patient. Comments:  discharged from prior psychiatrist as she missed an appt. for now given new list. given medication refill mean time. Orders:  -     TSH 3RD GENERATION; Future    Trouble in sleeping: Given refill of medication. See above  -     traZODone (DESYREL) 100 mg tablet; Take 1 Tab by mouth daily. , Normal, Disp-30 Tab, R-1    Other iron deficiency anemia: Reviewed prior labs. Supposed to repeat the labs but has not done that yet. Advised to take OTC iron supplement and recheck labs  -     CBC WITH AUTOMATED DIFF; Future    Screening for hyperlipidemia  -     LIPID PANEL;  Future    Vitamin D deficiency: On vitamin D supplement  - VITAMIN D, 25 HYDROXY; Future      Patient understood and agreed with the plan  [place] for Covid vaccine  Up-to-date with mammogram  Please note that this dictation was completed with [employer], the computer voice recognition software. Quite often unanticipated grammatical, syntax, homophones, and other interpretive errors are inadvertently transcribed by the computer software. Please disregard these errors. Please excuse any errors that have escaped final proofreading. [email or web address]  Subjective:     Done visit with Doxy  Last visit had multiple side pain. Neck pain, back pain and right knee pain. Currently stable on symptomatic treatment. No concern regarding that. Taking vitamin D supplement. Also complaint of chest pain and palpitation. Reviewed cardiology notes. Now wearing Holter and scheduled to do the stress test and the echo. Will follow the recommendation. Currently denies any anginal symptoms. No shortness of breath. During visit sitting comfortable and did not appear in any acute distress. No headache or dizziness. No shortness of breath. No nausea vomiting or abdominal pain. No leg swelling. No cold cough wheezing or fever. No urinary burning urgency or frequency. No regina hematuria. No constipation or diarrhea. No appetite or weight changes. Sleep is fair on trazodone. History of depression. Missed a follow-up appointment with psychiatry so she was discharged from the practice. Currently given refill on her Zoloft and trazodone meantime she will establish with a new psychiatry. List of specialist were provided. Patient is aware and she has already put the call. Noted anemia. Will recheck the lab. She is supposed to do the labs but missed that. No unusual fatigue.   Lab Results   Component Value Date/Time    WBC 7.0 09/21/2020 12:00 AM    HGB 10.7 (L) 09/21/2020 12:00 AM    HCT 32.6 (L) 09/21/2020 12:00 AM    PLATELET 897 85/64/6323 12:00 AM    MCV 86 09/21/2020 12:00 AM Lab Results   Component Value Date/Time    Sodium 141 09/21/2020 12:00 AM    Potassium 4.0 09/21/2020 12:00 AM    Chloride 101 09/21/2020 12:00 AM    CO2 23 09/21/2020 12:00 AM    Anion gap 6 05/21/2019 04:44 PM    Glucose 106 (H) 09/21/2020 12:00 AM    BUN 11 09/21/2020 12:00 AM    Creatinine 0.76 09/21/2020 12:00 AM    BUN/Creatinine ratio 14 09/21/2020 12:00 AM    GFR est  09/21/2020 12:00 AM    GFR est non-AA 93 09/21/2020 12:00 AM    Calcium 9.4 09/21/2020 12:00 AM    Bilirubin, total <0.2 09/21/2020 12:00 AM    Alk. phosphatase 75 09/21/2020 12:00 AM    Protein, total 6.6 09/21/2020 12:00 AM    Albumin 3.9 09/21/2020 12:00 AM    Globulin 3.7 05/21/2019 04:44 PM    A-G Ratio 1.4 09/21/2020 12:00 AM    ALT (SGPT) 16 09/21/2020 12:00 AM    AST (SGOT) 20 09/21/2020 12:00 AM     Lab Results   Component Value Date/Time    Cholesterol, total 176 12/07/2018 12:00 AM    HDL Cholesterol 59 12/07/2018 12:00 AM    LDL, calculated 97 12/07/2018 12:00 AM    VLDL, calculated 20 12/07/2018 12:00 AM    Triglyceride 99 12/07/2018 12:00 AM     Lab Results   Component Value Date/Time    TSH 1.280 09/21/2020 12:00 AM     Lab Results   Component Value Date/Time    Hemoglobin A1c 5.3 12/07/2018 12:00 AM         Prior to Admission medications    Medication Sig Start Date End Date Taking? Authorizing Provider   traZODone (DESYREL) 100 mg tablet Take 1 Tab by mouth daily. 4/9/21  Yes Sidny Fajardo MD   sertraline (ZOLOFT) 100 mg tablet sertraline 100 mg tablet 4/8/21  Yes Sindy Fajardo MD   aspirin delayed-release 81 mg tablet Take 1 Tab by mouth daily. 3/19/21  Yes Mikey Mejia MD   ergocalciferol (ERGOCALCIFEROL) 1,250 mcg (50,000 unit) capsule TAKE 1 CAPSULE BY MOUTH ONE TIME PER WEEK 12/30/20  Yes Rogelio Scott MD   meloxicam (MOBIC) 15 mg tablet meloxicam 15 mg tablet   Yes Nolvia Ospina MD   estradioL (ESTRACE) 0.5 mg tablet Take 1 Tab by mouth daily.    Yes Provider, Historical   traZODone (DESYREL) 100 mg tablet Take 1 Tab by mouth daily. 21  Yahaira Romo MD   nitroglycerin (NITROSTAT) 0.4 mg SL tablet 1 Tab by SubLINGual route every five (5) minutes as needed for Chest Pain. Up to 3 doses. 3/19/21   Emmy Nichols MD     Patient Active Problem List    Diagnosis Date Noted    Abnormal mammogram 2020    Fibromyalgia 2020    Multiple joint pain 2020    Chronic fatigue 2020    Depression, major, severe recurrence ([facility] [address] 2019     Current Outpatient Medications   Medication Sig Dispense Refill    traZODone (DESYREL) 100 mg tablet Take 1 Tab by mouth daily. 30 Tab 1    sertraline (ZOLOFT) 100 mg tablet sertraline 100 mg tablet 30 Tab 1    aspirin delayed-release 81 mg tablet Take 1 Tab by mouth daily. 30 Tab 5    ergocalciferol (ERGOCALCIFEROL) 1,250 mcg (50,000 unit) capsule TAKE 1 CAPSULE BY MOUTH ONE TIME PER WEEK      meloxicam (MOBIC) 15 mg tablet meloxicam 15 mg tablet      estradioL (ESTRACE) 0.5 mg tablet Take 1 Tab by mouth daily.  nitroglycerin (NITROSTAT) 0.4 mg SL tablet 1 Tab by SubLINGual route every five (5) minutes as needed for Chest Pain. Up to 3 doses.  1 Bottle 5     No Known Allergies  Past Medical History:   Diagnosis Date    Anemia     Arthritis     Fibroids     uterine fibroids    Fibromyalgia     Menorrhagia     Psychiatric disorder     anxiety    Tobacco abuse     quit in      Social History     Tobacco Use    Smoking status: Former Smoker     Packs/day: 0.25     Types: Cigarettes     Quit date:      Years since quittin.2    Smokeless tobacco: Never Used   Substance Use Topics    Alcohol use: No       ROS: See HPI    Objective:     Patient-Reported Vitals 2021   Patient-Reported Weight 201   Patient-Reported Height 5-5   Patient-Reported Pulse -   Patient-Reported Temperature -   Patient-Reported Systolic  -   Patient-Reported Diastolic -   Patient-Reported LMP -      General: alert, cooperative, no distress   Mental  status: normal mood, behavior, speech, dress, motor activity, and thought processes, able to follow commands   HENT: NCAT   Neck: no visualized mass   Resp: no respiratory distress   Neuro: no gross deficits   Skin: no discoloration or lesions of concern on visible areas   Psychiatric: normal affect, consistent with stated mood, no evidence of hallucinations     Additional exam findings: We discussed the expected course, resolution and complications of the diagnosis(es) in detail. Medication risks, benefits, costs, interactions, and alternatives were discussed as indicated. I advised her to contact the office if her condition worsens, changes or fails to improve as anticipated. She expressed understanding with the diagnosis(es) and plan. Merle Felix, was evaluated through a synchronous (real-time) audio-video encounter. The patient (or guardian if applicable) is aware that this is a billable service. Verbal consent to proceed has been obtained within the past 12 months. The visit was conducted pursuant to the emergency declaration under the [address] [place] [address] authority and the [employer] Act. Patient identification was verified, and a caregiver was present when appropriate. The patient was located in a state where the provider was credentialed to provide care.     Elen Penn MD

## 2021-04-10 ENCOUNTER — HOSPITAL ENCOUNTER (OUTPATIENT)
Dept: LAB | Age: 51
Discharge: HOME | End: 2021-04-10

## 2021-04-10 LAB — XX-LABCORP SPECIMEN COL,LCBCF: NORMAL

## 2021-04-10 PROCEDURE — 99001 SPECIMEN HANDLING PT-LAB: CPT

## 2021-04-12 LAB
25(OH)D3+25(OH)D2 SERPL-MCNC: 36.4 NG/ML (ref 30–100)
ALBUMIN SERPL-MCNC: 4.1 G/DL (ref 3.8–4.8)
ALBUMIN/GLOB SERPL: 1.5 {RATIO} (ref 1.2–2.2)
ALP SERPL-CCNC: 79 IU/L (ref 39–117)
ALT SERPL-CCNC: 14 IU/L (ref 0–32)
AST SERPL-CCNC: 22 IU/L (ref 0–40)
BASOPHILS # BLD AUTO: 0 X10E3/UL (ref 0–0.2)
BASOPHILS NFR BLD AUTO: 1 %
BILIRUB SERPL-MCNC: <0.2 MG/DL (ref 0–1.2)
BUN SERPL-MCNC: 8 MG/DL (ref 6–24)
BUN/CREAT SERPL: 9 (ref 9–23)
CALCIUM SERPL-MCNC: 9.4 MG/DL (ref 8.7–10.2)
CHLORIDE SERPL-SCNC: 107 MMOL/L (ref 96–106)
CHOLEST SERPL-MCNC: 170 MG/DL (ref 100–199)
CO2 SERPL-SCNC: 22 MMOL/L (ref 20–29)
CREAT SERPL-MCNC: 0.94 MG/DL (ref 0.57–1)
EOSINOPHIL # BLD AUTO: 0.1 X10E3/UL (ref 0–0.4)
EOSINOPHIL NFR BLD AUTO: 1 %
ERYTHROCYTE [DISTWIDTH] IN BLOOD BY AUTOMATED COUNT: 11.8 % (ref 11.7–15.4)
GLOBULIN SER CALC-MCNC: 2.8 G/DL (ref 1.5–4.5)
GLUCOSE SERPL-MCNC: 108 MG/DL (ref 65–99)
HCT VFR BLD AUTO: 35.2 % (ref 34–46.6)
HDLC SERPL-MCNC: 42 MG/DL
HGB BLD-MCNC: 11.5 G/DL (ref 11.1–15.9)
IMM GRANULOCYTES # BLD AUTO: 0 X10E3/UL (ref 0–0.1)
IMM GRANULOCYTES NFR BLD AUTO: 0 %
IMP & REVIEW OF LAB RESULTS: NORMAL
LDLC SERPL CALC-MCNC: 89 MG/DL (ref 0–99)
LYMPHOCYTES # BLD AUTO: 2.7 X10E3/UL (ref 0.7–3.1)
LYMPHOCYTES NFR BLD AUTO: 45 %
MCH RBC QN AUTO: 28.5 PG (ref 26.6–33)
MCHC RBC AUTO-ENTMCNC: 32.7 G/DL (ref 31.5–35.7)
MCV RBC AUTO: 87 FL (ref 79–97)
MONOCYTES # BLD AUTO: 0.5 X10E3/UL (ref 0.1–0.9)
MONOCYTES NFR BLD AUTO: 9 %
NEUTROPHILS # BLD AUTO: 2.6 X10E3/UL (ref 1.4–7)
NEUTROPHILS NFR BLD AUTO: 44 %
PLATELET # BLD AUTO: 311 X10E3/UL (ref 150–450)
POTASSIUM SERPL-SCNC: 3.9 MMOL/L (ref 3.5–5.2)
PROT SERPL-MCNC: 6.9 G/DL (ref 6–8.5)
RBC # BLD AUTO: 4.03 X10E6/UL (ref 3.77–5.28)
SODIUM SERPL-SCNC: 143 MMOL/L (ref 134–144)
TRIGL SERPL-MCNC: 230 MG/DL (ref 0–149)
TSH SERPL DL<=0.005 MIU/L-ACNC: 0.61 UIU/ML (ref 0.45–4.5)
VLDLC SERPL CALC-MCNC: 39 MG/DL (ref 5–40)
WBC # BLD AUTO: 6 X10E3/UL (ref 3.4–10.8)

## 2021-04-13 NOTE — PROGRESS NOTES
Elevated triglyceride. At this time to option. At the start the statin or diet modification. Low-fat diet. We can repeat labs in 2 months. Please let me know what patient thinks as she has history of fibromyalgia and statin can cause muscle fatigue.   Need to be more compliant with diet modification

## 2021-04-13 NOTE — PROGRESS NOTES
Contacted patient and verified identity using name and date of birth (2- identifiers)  Spoke with patient and she verbalized understanding of Elevated triglyceride. At this time to option. At the start the statin or diet modification. Low-fat diet. We can repeat labs in 2 months. Please let me know what patient thinks as she has history of fibromyalgia and statin can cause muscle fatigue. Need to be more compliant with diet modification   She indicated that she would like to try Lifestyle/diet modification to include low fat/low carb diet, exercise as tolerated and weight loss. She is advised that we will repeat labs in 2 months and if no significant improvement then medication is to be considered.

## 2021-04-20 ENCOUNTER — TELEPHONE (OUTPATIENT)
Dept: FAMILY MEDICINE CLINIC | Age: 51
End: 2021-04-20

## 2021-04-20 NOTE — TELEPHONE ENCOUNTER
We offer hepatitis C screening test in the people born between [id number]. Also with risk factors like iv drug abuse, sharing needles, blood transfusion, tattoos, multiple sexual partner etc. If you do not have these risk factors you are at low risk. For now I will hold off on orders and will discuss it on follow up visit.

## 2021-04-20 NOTE — TELEPHONE ENCOUNTER
Pt returned call and was unable to contact nurse. Gave pt the information from Dr Gordon Winters. Pt voiced understanding and states that she doesn't need the Hep C. After all.

## 2021-04-21 ENCOUNTER — TELEPHONE (OUTPATIENT)
Dept: CARDIOLOGY CLINIC | Age: 51
End: 2021-04-21

## 2021-04-22 LAB
ECHO AO ROOT DIAM: 2.99 CM
ECHO LA AREA 4C: 19.64 CM2
ECHO LA VOL 2C: 47.17 ML (ref 22–52)
ECHO LA VOL 4C: 49.5 ML (ref 22–52)
ECHO LA VOL BP: 57.34 ML (ref 22–52)
ECHO LA VOL/BSA BIPLANE: 28.74 ML/M2 (ref 16–28)
ECHO LA VOLUME INDEX A2C: 23.65 ML/M2 (ref 16–28)
ECHO LA VOLUME INDEX A4C: 24.81 ML/M2 (ref 16–28)
ECHO LV E' LATERAL VELOCITY: 12.2 CM/S
ECHO LV E' SEPTAL VELOCITY: 11.88 CM/S
ECHO LV EDV A2C: 65.54 ML
ECHO LV EDV A4C: 64.63 ML
ECHO LV EDV BP: 66.2 ML (ref 56–104)
ECHO LV EDV INDEX A4C: 32.4 ML/M2
ECHO LV EDV INDEX BP: 33.2 ML/M2
ECHO LV EDV NDEX A2C: 32.9 ML/M2
ECHO LV EJECTION FRACTION A2C: 72 PERCENT
ECHO LV EJECTION FRACTION A4C: 58 PERCENT
ECHO LV EJECTION FRACTION BIPLANE: 65.4 PERCENT (ref 55–100)
ECHO LV ESV A2C: 18.5 ML
ECHO LV ESV A4C: 27.41 ML
ECHO LV ESV BP: 22.9 ML (ref 19–49)
ECHO LV ESV INDEX A2C: 9.3 ML/M2
ECHO LV ESV INDEX A4C: 13.7 ML/M2
ECHO LV ESV INDEX BP: 11.5 ML/M2
ECHO LV INTERNAL DIMENSION DIASTOLIC: 4.24 CM (ref 3.9–5.3)
ECHO LV INTERNAL DIMENSION SYSTOLIC: 2.3 CM
ECHO LV IVSD: 1.01 CM (ref 0.6–0.9)
ECHO LV MASS 2D: 150.2 G (ref 67–162)
ECHO LV MASS INDEX 2D: 75.3 G/M2 (ref 43–95)
ECHO LV POSTERIOR WALL DIASTOLIC: 1.1 CM (ref 0.6–0.9)
ECHO LVOT DIAM: 1.86 CM
ECHO LVOT PEAK GRADIENT: 4.19 MMHG
ECHO LVOT PEAK VELOCITY: 102.37 CM/S
ECHO LVOT SV: 47 ML
ECHO LVOT SV: 58.8 ML
ECHO LVOT VTI: 21.55 CM
ECHO MV A VELOCITY: 54.38 CM/S
ECHO MV E DECELERATION TIME (DT): 199.11 MS
ECHO MV E VELOCITY: 87.38 CM/S
ECHO MV E/A RATIO: 1.61
ECHO MV E/E' LATERAL: 7.16
ECHO MV E/E' RATIO (AVERAGED): 7.26
ECHO MV E/E' SEPTAL: 7.36
ECHO PV REGURGITANT MAX VELOCITY: 263.93 CM/S
ECHO PVEIN A DURATION: 81.83 MS
ECHO PVEIN A VELOCITY: 26.95 CM/S
ECHO RV TAPSE: 1.88 CM (ref 1.5–2)
ECHO TV REGURGITANT PEAK GRADIENT: 27.87 MMHG
LVOT MG: 1.99 MMHG
STRESS BASELINE DIAS BP: 78 MMHG
STRESS BASELINE HR: 61 BPM
STRESS BASELINE SYS BP: 122 MMHG
STRESS ESTIMATED WORKLOAD: 1 METS
STRESS EXERCISE DUR MIN: NORMAL
STRESS PEAK DIAS BP: 78 MMHG
STRESS PEAK SYS BP: 122 MMHG
STRESS PERCENT HR ACHIEVED: 65 %
STRESS POST PEAK HR: 112 BPM
STRESS RATE PRESSURE PRODUCT: NORMAL BPM*MMHG
STRESS ST DEPRESSION: 0 MM
STRESS ST ELEVATION: 0 MM
STRESS STAGE 1 BP: NORMAL MMHG
STRESS STAGE 1 DURATION: 3 MIN:SEC
STRESS STAGE 1 HR: 96 BPM
STRESS STAGE RECOVERY 1 BP: NORMAL MMHG
STRESS STAGE RECOVERY 1 DURATION: 1 MIN:SEC
STRESS STAGE RECOVERY 1 HR: 91 BPM
STRESS TARGET HR: 171 BPM

## 2021-04-26 ENCOUNTER — OFFICE VISIT (OUTPATIENT)
Dept: CARDIOLOGY CLINIC | Age: 51
End: 2021-04-26
Payer: MEDICAID

## 2021-04-26 VITALS
WEIGHT: 198 LBS | BODY MASS INDEX: 32.99 KG/M2 | SYSTOLIC BLOOD PRESSURE: 120 MMHG | HEART RATE: 79 BPM | DIASTOLIC BLOOD PRESSURE: 78 MMHG | OXYGEN SATURATION: 100 % | HEIGHT: 65 IN

## 2021-04-26 DIAGNOSIS — R07.9 CHEST PAIN, UNSPECIFIED TYPE: Primary | ICD-10-CM

## 2021-04-26 DIAGNOSIS — R06.09 DOE (DYSPNEA ON EXERTION): ICD-10-CM

## 2021-04-26 PROCEDURE — 99213 OFFICE O/P EST LOW 20 MIN: CPT | Performed by: INTERNAL MEDICINE

## 2021-04-26 NOTE — LETTER
4/26/2021 Patient: Aldair Presley YOB: 1970 Date of Visit: 4/26/2021 Marina Burr MD 
[address] [place] [address] [address] [facility] [employer] Dear Marina Burr MD, Thank you for referring Ms. Aldair Presley to [address] [place] [address] for evaluation. My notes for this consultation are attached. If you have questions, please do not hesitate to call me. I look forward to following your patient along with you. Sincerely, Saloni Ohara MD

## 2021-04-26 NOTE — PROGRESS NOTES
Thomes Aschoff presents today for   Chief Complaint   Patient presents with    Follow-up     6-8 week follow up       Thomes Aschoff preferred language for health care discussion is english/other. Is someone accompanying this pt? no    Is the patient using any DME equipment during [address]? no    Depression Screening:  3 most recent PHQ Screens 4/26/2021   PHQ Not Done -   Little interest or pleasure in doing things Not at all   Feeling down, depressed, irritable, or hopeless Not at all   Total Score PHQ 2 0   Trouble falling or staying asleep, or sleeping too much -   Feeling tired or having little energy -   Poor appetite, weight loss, or overeating -   Feeling bad about yourself - or that you are a failure or have let yourself or your family down -   Trouble concentrating on things such as school, work, reading, or watching TV -   Moving or speaking so slowly that other people could have noticed; or the opposite being so fidgety that others notice -   Thoughts of being better off dead, or hurting yourself in some way -   PHQ 9 Score -   How difficult have these problems made it for you to do your work, take care of your home and get along with others -       Learning Assessment:  Learning Assessment 4/26/2021   PRIMARY LEARNER Patient   HIGHEST LEVEL OF EDUCATION - PRIMARY LEARNER  -   BARRIERS PRIMARY LEARNER -   CO-LEARNER CAREGIVER -   PRIMARY LANGUAGE ENGLISH    NEED -   LEARNER PREFERENCE PRIMARY DEMONSTRATION   LEARNING SPECIAL TOPICS -   ANSWERED BY patient   RELATIONSHIP SELF       Abuse Screening:  Abuse Screening Questionnaire 4/26/2021   Do you ever feel afraid of your partner? N   Are you in a relationship with someone who physically or mentally threatens you? N   Is it safe for you to go home? Y         Pt currently taking Anticoagulant therapy? no    Pt currently taking Antiplatelet therapy ? ASA 81 mg once a day      Coordination of Care:  1.  Have you been to the ER, urgent care clinic since your last visit? Hospitalized since your last visit? no    2. Have you seen or consulted any other health care providers outside of the [address] [place] [address] since your last visit? Include any pap smears or colon screening.  no

## 2021-04-26 NOTE — PROGRESS NOTES
[facility]    Ms. Camille Lopez is 54 y.o. female with history of anemia, arthritis, fibromyalgia    Patient is here today for follow-up appointment. She denies any hospital admission or ER visits since last time. She denies any specific chest pain or chest tightness or any heart failure symptoms. She tells me that she wore monitor for about 3 weeks and just returned to monitor over the weekend. She occasionally continues to feel some palpitation however no presyncope or syncope. She is able to perform activity of daily living without any restrictions. Denies any nausea, vomiting, abdominal pain, fever, chills, sputum production. No hematuria or other bleeding complaints    Past Medical History:   Diagnosis Date    Anemia     Arthritis     Fibroids     uterine fibroids    Fibromyalgia     Menorrhagia     Psychiatric disorder     anxiety    Tobacco abuse     quit in 2020       Review of Systems:  Cardiac symptoms as noted above in HPI. All others negative. Current Outpatient Medications   Medication Sig    HYDROcodone-acetaminophen (NORCO) 7.5-325 mg per tablet TAKE 1 TABLET BY MOUTH EVERY 4 TO 6 HOURS AS NEESDED FOR PAIN    ciprofloxacin HCl (CIPRO) 500 mg tablet Take 1 Tab by mouth two (2) times a day.  traZODone (DESYREL) 100 mg tablet Take 1 Tab by mouth daily.  sertraline (ZOLOFT) 100 mg tablet sertraline 100 mg tablet    aspirin delayed-release 81 mg tablet Take 1 Tab by mouth daily.  nitroglycerin (NITROSTAT) 0.4 mg SL tablet 1 Tab by SubLINGual route every five (5) minutes as needed for Chest Pain. Up to 3 doses.  ergocalciferol (ERGOCALCIFEROL) 1,250 mcg (50,000 unit) capsule TAKE 1 CAPSULE BY MOUTH ONE TIME PER WEEK    meloxicam (MOBIC) 15 mg tablet meloxicam 15 mg tablet    estradioL (ESTRACE) 0.5 mg tablet Take 1 Tab by mouth daily. No current facility-administered medications for this visit. Past Surgical History:   Procedure Laterality Date    HX HYSTERECTOMY      HX OTHER SURGICAL      surgery for punctured intestine    HX OVARIAN CYST REMOVAL Bilateral     HX TUBAL LIGATION         Allergies and Sensitivities:  No Known Allergies    Family History:  Family History   Problem Relation Age of Onset    Hypertension Mother     Diabetes Mother     Breast Cancer Maternal Aunt        Social History:  Social History     Tobacco Use    Smoking status: Former Smoker     Packs/day: 0.25     Types: Cigarettes     Quit date:      Years since quittin.3    Smokeless tobacco: Never Used   Substance Use Topics    Alcohol use: No    Drug use: No     She  reports that she quit smoking about 7 years ago. Her smoking use included cigarettes. She smoked 0.25 packs per day. She has never used smokeless tobacco.  She  reports no history of alcohol use. Physical Exam:  BP Readings from Last 3 Encounters:   21 120/78   21 122/80   21 122/80         Pulse Readings from Last 3 Encounters:   21 79   21 71   21 72          Wt Readings from Last 3 Encounters:   21 89.8 kg (198 lb)   21 92.5 kg (204 lb)   21 92.5 kg (204 lb)       Constitutional: Oriented to person, place, and time. HENT: Head: Normocephalic and atraumatic. Neck: No JVD present. Carotid bruit is not appreciated. Cardiovascular: Regular rhythm. No murmur, gallop or rubs appreciated  Lung: Breath sounds normal. No respiratory distress. No ronchi or rales appreciated  Abdominal: No tenderness. No rebound and no guarding. Musculoskeletal: There is no lower extremity edema.  No cynosis      LABS:   @  Lab Results   Component Value Date/Time    WBC 6.0 04/10/2021 12:00 AM    HGB 11.5 04/10/2021 12:00 AM    HCT 35.2 04/10/2021 12:00 AM    PLATELET 362  12:00 AM    MCV 87 04/10/2021 12:00 AM     Lab Results   Component Value Date/Time    Sodium 143 04/10/2021 12:00 AM Potassium 3.9 04/10/2021 12:00 AM    Chloride 107 (H) 04/10/2021 12:00 AM    CO2 22 04/10/2021 12:00 AM    Glucose 108 (H) 04/10/2021 12:00 AM    BUN 8 04/10/2021 12:00 AM    Creatinine 0.94 04/10/2021 12:00 AM     Lipids Latest Ref Rng & Units 4/10/2021 12/7/2018   Chol, Total 100 - 199 mg/dL 170 176   HDL >39 mg/dL 42 59   LDL 0 - 99 mg/dL 89 97   Trig 0 - 149 mg/dL 230(H) 99   Some recent data might be hidden     Lab Results   Component Value Date/Time    ALT (SGPT) 14 04/10/2021 12:00 AM     Lab Results   Component Value Date/Time    Hemoglobin A1c 5.3 12/07/2018 12:00 AM     Lab Results   Component Value Date/Time    TSH 0.608 04/10/2021 12:00 AM       EKG  Sinus rhythm at 71 bpm.  No pathologic Q waves. Nonspecific T wave changes. ECHO (04/2021)  Left Ventricle Normal cavity size, wall thickness and systolic function (ejection fraction normal). Wall motion: normal. The estimated EF is 60 - 65%. Visually measured ejection fraction. There is inconclusive left ventricular diastolic function. Wall Scoring The left ventricular wall motion is normal.            Left Atrium Normal cavity size. Left Atrium volume index is 29 mL/m2. Right Ventricle Normal cavity size and global systolic function. Right Atrium Normal cavity size. Aortic Valve Normal valve structure, trileaflet valve structure, no stenosis and no regurgitation. Mitral Valve Normal valve structure, no stenosis and no regurgitation. Leaflet calcification. There is mild anterior leaflet calcification diffusely. Tricuspid Valve Normal valve structure and no stenosis. Trace regurgitation. Pulmonic Valve Pulmonic valve normal doppler findings. Normal valve structure, no stenosis and no regurgitation. Aorta Normal aortic root. Pulmonary Artery Pulmonary arterial systolic pressure (PASP) is 31 mmHg. Pulmonary hypertension not suggested by Doppler findings. IVC/Hepatic Veins Normal structure. Normal central venous pressure (3 mmHg);  IVC diameter is less than 21 mm and collapses more than 50% with respiration. Pericardium Insignificant pericardial effusion or fat. STRESS TEST (03/2021)  · SPECT: Left ventricular perfusion is normal. Myocardial perfusion imaging supports a low risk stress test.  · SPECT: Left ventricular function post-stress was normal. Calculated ejection fraction is 73%. There is no evidence of transient ischemic dilation (TID). The TID ratio is 1.05. IMPRESSION & PLAN:  Ms. Katheryn Mera is a 54 y.o. female with fibromyalgia, arthritis    Chest pain/dyspnea:  Overall symptoms are much better and have essentially resolved. Echocardiogram and stress test which was done in 03/2021, low risk. Findings were discussed with patient in detail and she was reassured. Have asked patient to continue with risk factor modification and lifestyle changes. She has not used any nitroglycerin which was prescribed on last visit. Palpitation:  Without any presyncope or syncope. Echocardiogram in 03/21 without any structural abnormality. TSH in 2019 in 2020, normal  Denies any excessive caffeine intake  Event monitor was placed and she just returned the monitor 48 hours ago. We will try to get the results over next few days. She was advised to call us back for the results. This plan was discussed with patient who is in agreement. Thank you for allowing me to participate in patient care. Please feel free to call me if you have any question or concern. Gino Morfin MD  Please note: This document has been produced using voice recognition software. Unrecognized errors in transcription may be present.

## 2021-09-16 ENCOUNTER — TELEPHONE (OUTPATIENT)
Dept: FAMILY MEDICINE CLINIC | Age: 51
End: 2021-09-16

## 2021-09-16 NOTE — TELEPHONE ENCOUNTER
Pt asked if it is ok to have the flu vaccines since she had both COVID vaccines? She would also like to know why she needs the Hep C lab done? Please advise.

## 2021-09-27 RX ORDER — NITROGLYCERIN 0.4 MG/1
TABLET SUBLINGUAL
Qty: 25 TABLET | Refills: 5 | Status: SHIPPED | OUTPATIENT
Start: 2021-09-27 | End: 2022-05-23

## 2021-10-25 ENCOUNTER — OFFICE VISIT (OUTPATIENT)
Dept: CARDIOLOGY CLINIC | Age: 51
End: 2021-10-25
Payer: MEDICARE

## 2021-10-25 ENCOUNTER — TELEPHONE (OUTPATIENT)
Dept: CARDIOLOGY CLINIC | Age: 51
End: 2021-10-25

## 2021-10-25 VITALS
WEIGHT: 197 LBS | HEIGHT: 65 IN | SYSTOLIC BLOOD PRESSURE: 118 MMHG | DIASTOLIC BLOOD PRESSURE: 74 MMHG | OXYGEN SATURATION: 97 % | BODY MASS INDEX: 32.82 KG/M2 | HEART RATE: 87 BPM

## 2021-10-25 DIAGNOSIS — R00.2 PALPITATIONS: Primary | ICD-10-CM

## 2021-10-25 PROCEDURE — 99213 OFFICE O/P EST LOW 20 MIN: CPT | Performed by: INTERNAL MEDICINE

## 2021-10-25 NOTE — TELEPHONE ENCOUNTER
Dr Jamari Salazar wanted results for event monitor preventice sent over a cancel notification which is scanned in chart, event monitor was never connected correctly. Verbal order and read back per Odin Peralta MD  Since patients symptoms have subside no need to reorder event monitor at this time. If patient should have concerns in the future advise to contact our office. This has been fully explained to the patient, who indicates understanding.

## 2021-10-25 NOTE — PROGRESS NOTES
[facility]    Ms. Arlet Granado is 54 y.o. female with history of anemia, arthritis, fibromyalgia    Patient is here today for follow-up appointment. Since last visit patient has been doing very well without any cardiac symptom. She denies any hospital admission or ER visit. She denies any palpitation, presyncope or syncope. She is taking all her medication as prescribed. Overall she has no new cardiac symptoms to report  Denies any nausea, vomiting, abdominal pain, fever, chills, sputum production. No hematuria or other bleeding complaints    Past Medical History:   Diagnosis Date    Anemia     Arthritis     Fibroids     uterine fibroids    Fibromyalgia     Menorrhagia     Psychiatric disorder     anxiety    Tobacco abuse     quit in 2020       Review of Systems:  Cardiac symptoms as noted above in HPI. All others negative. Current Outpatient Medications   Medication Sig    nitroglycerin (NITROSTAT) 0.4 mg SL tablet 1 TAB BY SUBLINGUAL ROUTE EVERY FIVE MINUTES AS NEEDED FOR CHEST PAIN. UP TO 3 DOSES. CALL 911    sertraline (ZOLOFT) 100 mg tablet SERTRALINE 100 MG TABLET    traZODone (DESYREL) 100 mg tablet Take 1 Tab by mouth daily.  aspirin delayed-release 81 mg tablet Take 1 Tab by mouth daily. (Patient taking differently: Take 81 mg by mouth every other day.)    ergocalciferol (ERGOCALCIFEROL) 1,250 mcg (50,000 unit) capsule TAKE 1 CAPSULE BY MOUTH ONE TIME PER WEEK    meloxicam (MOBIC) 15 mg tablet meloxicam 15 mg tablet     No current facility-administered medications for this visit.        Past Surgical History:   Procedure Laterality Date    HX HYSTERECTOMY      HX OTHER SURGICAL      surgery for punctured intestine    HX OVARIAN CYST REMOVAL Bilateral     HX TUBAL LIGATION         Allergies and Sensitivities:  No Known Allergies    Family History:  Family History   Problem Relation Age of Onset    Hypertension Mother     Diabetes Mother     Breast Cancer Maternal Aunt        Social History:  Social History     Tobacco Use    Smoking status: Former Smoker     Packs/day: 0.25     Types: Cigarettes     Quit date:      Years since quittin.8    Smokeless tobacco: Never Used   Substance Use Topics    Alcohol use: No    Drug use: No     She  reports that she quit smoking about 7 years ago. Her smoking use included cigarettes. She smoked 0.25 packs per day. She has never used smokeless tobacco.  She  reports no history of alcohol use. Physical Exam:  BP Readings from Last 3 Encounters:   10/25/21 118/74   21 120/78   21 122/80         Pulse Readings from Last 3 Encounters:   10/25/21 87   21 79   21 71          Wt Readings from Last 3 Encounters:   10/25/21 89.4 kg (197 lb)   21 89.8 kg (198 lb)   21 92.5 kg (204 lb)     Constitutional: Oriented to person, place, and time. HENT: Head: Normocephalic and atraumatic. Neck: No JVD present. Carotid bruit is not appreciated. Cardiovascular: Regular rhythm. No murmur, gallop or rubs appreciated  Lung: Breath sounds normal. No respiratory distress. No ronchi or rales appreciated  Abdominal: No tenderness. No rebound and no guarding. Musculoskeletal: There is no lower extremity edema.  No cynosis    LABS:   @  Lab Results   Component Value Date/Time    WBC 6.0 04/10/2021 12:00 AM    HGB 11.5 04/10/2021 12:00 AM    HCT 35.2 04/10/2021 12:00 AM    PLATELET 055  12:00 AM    MCV 87 04/10/2021 12:00 AM     Lab Results   Component Value Date/Time    Sodium 143 04/10/2021 12:00 AM    Potassium 3.9 04/10/2021 12:00 AM    Chloride 107 (H) 04/10/2021 12:00 AM    CO2 22 04/10/2021 12:00 AM    Glucose 108 (H) 04/10/2021 12:00 AM    BUN 8 04/10/2021 12:00 AM    Creatinine 0.94 04/10/2021 12:00 AM     Lipids Latest Ref Rng & Units 4/10/2021 2018   Chol, Total 100 - 199 mg/dL 170 176   HDL >39 mg/dL 42 59   LDL 0 - 99 mg/dL 89 97   Trig 0 - 149 mg/dL 230(H) 99   Some recent data might be hidden     Lab Results   Component Value Date/Time    ALT (SGPT) 14 04/10/2021 12:00 AM     Lab Results   Component Value Date/Time    Hemoglobin A1c 5.3 12/07/2018 12:00 AM     Lab Results   Component Value Date/Time    TSH 0.608 04/10/2021 12:00 AM       EKG  Sinus rhythm at 71 bpm.  No pathologic Q waves. Nonspecific T wave changes. ECHO (04/2021)  Left Ventricle Normal cavity size, wall thickness and systolic function (ejection fraction normal). Wall motion: normal. The estimated EF is 60 - 65%. Visually measured ejection fraction. There is inconclusive left ventricular diastolic function. Wall Scoring The left ventricular wall motion is normal.            Left Atrium Normal cavity size. Left Atrium volume index is 29 mL/m2. Right Ventricle Normal cavity size and global systolic function. Right Atrium Normal cavity size. Aortic Valve Normal valve structure, trileaflet valve structure, no stenosis and no regurgitation. Mitral Valve Normal valve structure, no stenosis and no regurgitation. Leaflet calcification. There is mild anterior leaflet calcification diffusely. Tricuspid Valve Normal valve structure and no stenosis. Trace regurgitation. Pulmonic Valve Pulmonic valve normal doppler findings. Normal valve structure, no stenosis and no regurgitation. Aorta Normal aortic root. Pulmonary Artery Pulmonary arterial systolic pressure (PASP) is 31 mmHg. Pulmonary hypertension not suggested by Doppler findings. IVC/Hepatic Veins Normal structure. Normal central venous pressure (3 mmHg); IVC diameter is less than 21 mm and collapses more than 50% with respiration. Pericardium Insignificant pericardial effusion or fat.      STRESS TEST (03/2021)  · SPECT: Left ventricular perfusion is normal. Myocardial perfusion imaging supports a low risk stress test.  · SPECT: Left ventricular function post-stress was normal. Calculated ejection fraction is 73%. There is no evidence of transient ischemic dilation (TID). The TID ratio is 1.05. IMPRESSION & PLAN:  Ms. aGbriel Jimenez is a 54 y.o. female with fibromyalgia, arthritis    Palpitation:  Denies any palpitations since last visit. No presyncope or syncope  Echocardiogram in 03/21 without any structural abnormality. TSH in 2019 in 2020, normal  Denies any excessive caffeine intake  According to patient, she has returned monitor few months back. Unfortunately not able to locate the results. We will try to contact event monitor company and get the results. Fortunately patient is feeling back to normal and has no symptoms at this time    Currently patient denies any symptoms to suggest unstable angina or decompensated heart failure    This plan was discussed with patient who is in agreement. Thank you for allowing me to participate in patient care. Please feel free to call me if you have any question or concern. Monae Bangura MD  Please note: This document has been produced using voice recognition software. Unrecognized errors in transcription may be present.

## 2021-10-25 NOTE — PROGRESS NOTES
Maritza Palomino presents today for   Chief Complaint   Patient presents with    Follow-up     6 month follow up       Maritza Palomino preferred language for health care discussion is english/other. Is someone accompanying this pt? no    Is the patient using any DME equipment during [address]? no    Depression Screening:  3 most recent PHQ Screens 10/25/2021   PHQ Not Done -   Little interest or pleasure in doing things Not at all   Feeling down, depressed, irritable, or hopeless Not at all   Total Score PHQ 2 0   Trouble falling or staying asleep, or sleeping too much -   Feeling tired or having little energy -   Poor appetite, weight loss, or overeating -   Feeling bad about yourself - or that you are a failure or have let yourself or your family down -   Trouble concentrating on things such as school, work, reading, or watching TV -   Moving or speaking so slowly that other people could have noticed; or the opposite being so fidgety that others notice -   Thoughts of being better off dead, or hurting yourself in some way -   PHQ 9 Score -   How difficult have these problems made it for you to do your work, take care of your home and get along with others -       Learning Assessment:  Learning Assessment 10/25/2021   PRIMARY LEARNER Patient   HIGHEST LEVEL OF EDUCATION - PRIMARY LEARNER  -   BARRIERS PRIMARY LEARNER -   CO-LEARNER CAREGIVER -   PRIMARY LANGUAGE ENGLISH    NEED -   LEARNER PREFERENCE PRIMARY DEMONSTRATION   LEARNING SPECIAL TOPICS -   ANSWERED BY patient   RELATIONSHIP SELF       Abuse Screening:  Abuse Screening Questionnaire 10/25/2021   Do you ever feel afraid of your partner? N   Are you in a relationship with someone who physically or mentally threatens you? N   Is it safe for you to go home? Y             Pt currently taking Anticoagulant therapy? no    Pt currently taking Antiplatelet therapy ? ASA 81 mg every other day      Coordination of Care:  1.  Have you been to the ER, urgent care clinic since your last visit? Hospitalized since your last visit? no    2. Have you seen or consulted any other health care providers outside of the [address] [place] [address] since your last visit? Include any pap smears or colon screening.  no

## 2021-10-25 NOTE — LETTER
10/25/2021    Patient: Pia Barrios   YOB: 1970   Date of Visit: 10/25/2021     [address]   [address]  [address] [address]  [place]    Dear Daljit Williamson MD,      Thank you for referring Ms. Pia Barrios to [address] [place] [address] for evaluation. My notes for this consultation are attached. If you have questions, please do not hesitate to call me. I look forward to following your patient along with you.       Sincerely,    Jackson Owens MD

## 2022-01-07 ENCOUNTER — OFFICE VISIT (OUTPATIENT)
Dept: FAMILY MEDICINE CLINIC | Age: 52
End: 2022-01-07
Payer: MEDICARE

## 2022-01-07 VITALS
RESPIRATION RATE: 16 BRPM | HEIGHT: 65 IN | DIASTOLIC BLOOD PRESSURE: 60 MMHG | BODY MASS INDEX: 33.45 KG/M2 | TEMPERATURE: 97.4 F | SYSTOLIC BLOOD PRESSURE: 116 MMHG | WEIGHT: 200.8 LBS

## 2022-01-07 DIAGNOSIS — M79.89 SWELLING OF RIGHT MIDDLE FINGER: ICD-10-CM

## 2022-01-07 DIAGNOSIS — H92.02 DISCOMFORT OF LEFT EAR: Primary | ICD-10-CM

## 2022-01-07 PROCEDURE — 99213 OFFICE O/P EST LOW 20 MIN: CPT | Performed by: FAMILY MEDICINE

## 2022-01-07 NOTE — PATIENT INSTRUCTIONS
Earache: Care Instructions  Your Care Instructions     Even though infection is a common cause of ear pain, not all ear pain means an infection. If you have ear pain and don't have an infection, it could be because of a jaw problem, such as temporomandibular joint (TMJ) pain. Or it could be because of a neck problem. When ear discomfort or pain is mild or comes and goes without other symptoms, home treatment may be all you need. Follow-up care is a key part of your treatment and safety. Be sure to make and go to all appointments, and call your doctor if you are having problems. It's also a good idea to know your test results and keep a list of the medicines you take. How can you care for yourself at home? · Apply heat on the ear to ease pain. To apply heat, put a warm water bottle, a heating pad set on low, or a warm cloth on your ear. Do not go to sleep with a heating pad on your skin. · Take an over-the-counter pain medicine, such as acetaminophen (Tylenol), ibuprofen (Advil, Motrin), or naproxen (Aleve). Be safe with medicines. Read and follow all instructions on the label. · Do not take two or more pain medicines at the same time unless the doctor told you to. Many pain medicines have acetaminophen, which is Tylenol. Too much acetaminophen (Tylenol) can be harmful. · Never insert anything, such as a cotton swab or a xiao pin, into the ear. When should you call for help? Call your doctor now or seek immediate medical care if:    · You have new or worse symptoms of infection, such as:  ? Increased pain, swelling, warmth, or redness. ? Red streaks leading from the area. ? Pus draining from the area. ? A fever. Watch closely for changes in your health, and be sure to contact your doctor if:    · You have new or worse discharge coming from the ear.     · You do not get better as expected. Where can you learn more?   Go to http://www.gray.com/  Enter E407 in the search box to learn more about \"Earache: Care Instructions. \"  Current as of: December 2, 2020               Content Version: 13.0  © 2581-8099 [employer], [employer]. Care instructions adapted under license by [employer] (which disclaims liability or warranty for this information). If you have questions about a medical condition or this instruction, always ask your healthcare professional. [place] [address] any warranty or liability for your use of this information.
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
## 2022-01-12 RX ORDER — SERTRALINE HYDROCHLORIDE 100 MG/1
TABLET, FILM COATED ORAL
Qty: 90 TABLET | Refills: 0 | Status: SHIPPED | OUTPATIENT
Start: 2022-01-12 | End: 2022-05-23

## 2022-01-19 ENCOUNTER — HOSPITAL ENCOUNTER (OUTPATIENT)
Dept: MAMMOGRAPHY | Age: 52
Discharge: HOME | End: 2022-01-19
Payer: MEDICARE

## 2022-01-19 DIAGNOSIS — Z12.31 BREAST CANCER SCREENING BY MAMMOGRAM: ICD-10-CM

## 2022-01-19 PROCEDURE — 77063 BREAST TOMOSYNTHESIS BI: CPT

## 2022-03-10 ENCOUNTER — TRANSCRIBE ORDER (OUTPATIENT)
Dept: SCHEDULING | Age: 52
End: 2022-03-10

## 2022-03-10 DIAGNOSIS — Z12.39 SCREENING FOR MALIGNANT NEOPLASM OF BREAST: Primary | ICD-10-CM

## 2022-03-14 ENCOUNTER — HOSPITAL ENCOUNTER (OUTPATIENT)
Dept: GENERAL RADIOLOGY | Age: 52
Discharge: HOME | End: 2022-03-14
Payer: MEDICARE

## 2022-03-14 ENCOUNTER — OFFICE VISIT (OUTPATIENT)
Dept: FAMILY MEDICINE CLINIC | Age: 52
End: 2022-03-14
Payer: MEDICARE

## 2022-03-14 VITALS
OXYGEN SATURATION: 97 % | SYSTOLIC BLOOD PRESSURE: 136 MMHG | HEIGHT: 65 IN | RESPIRATION RATE: 16 BRPM | HEART RATE: 68 BPM | DIASTOLIC BLOOD PRESSURE: 80 MMHG | TEMPERATURE: 97.7 F | BODY MASS INDEX: 33.55 KG/M2 | WEIGHT: 201.4 LBS

## 2022-03-14 DIAGNOSIS — M54.2 NECK PAIN: ICD-10-CM

## 2022-03-14 DIAGNOSIS — M25.50 MULTIPLE JOINT PAIN: ICD-10-CM

## 2022-03-14 DIAGNOSIS — Z11.59 NEED FOR HEPATITIS C SCREENING TEST: ICD-10-CM

## 2022-03-14 DIAGNOSIS — H92.01 POSTERIOR AURICULAR PAIN OF RIGHT EAR: ICD-10-CM

## 2022-03-14 DIAGNOSIS — E04.2 MULTINODULAR GOITER: ICD-10-CM

## 2022-03-14 DIAGNOSIS — F33.2 SEVERE EPISODE OF RECURRENT MAJOR DEPRESSIVE DISORDER, WITHOUT PSYCHOTIC FEATURES (HCC): ICD-10-CM

## 2022-03-14 DIAGNOSIS — M25.561 RIGHT KNEE PAIN, UNSPECIFIED CHRONICITY: Primary | ICD-10-CM

## 2022-03-14 DIAGNOSIS — M25.561 RIGHT KNEE PAIN, UNSPECIFIED CHRONICITY: ICD-10-CM

## 2022-03-14 DIAGNOSIS — E78.1 HYPERTRIGLYCERIDEMIA: ICD-10-CM

## 2022-03-14 DIAGNOSIS — E55.9 VITAMIN D DEFICIENCY: ICD-10-CM

## 2022-03-14 DIAGNOSIS — Z00.00 MEDICARE ANNUAL WELLNESS VISIT, SUBSEQUENT: ICD-10-CM

## 2022-03-14 DIAGNOSIS — M79.7 FIBROMYALGIA: ICD-10-CM

## 2022-03-14 PROCEDURE — 70360 X-RAY EXAM OF NECK: CPT

## 2022-03-14 PROCEDURE — G8427 DOCREV CUR MEDS BY ELIG CLIN: HCPCS | Performed by: FAMILY MEDICINE

## 2022-03-14 PROCEDURE — 73560 X-RAY EXAM OF KNEE 1 OR 2: CPT

## 2022-03-14 PROCEDURE — G8417 CALC BMI ABV UP PARAM F/U: HCPCS | Performed by: FAMILY MEDICINE

## 2022-03-14 PROCEDURE — G9717 DOC PT DX DEP/BP F/U NT REQ: HCPCS | Performed by: FAMILY MEDICINE

## 2022-03-14 PROCEDURE — G0439 PPPS, SUBSEQ VISIT: HCPCS | Performed by: FAMILY MEDICINE

## 2022-03-14 PROCEDURE — 99214 OFFICE O/P EST MOD 30 MIN: CPT | Performed by: FAMILY MEDICINE

## 2022-03-14 RX ORDER — ESTRADIOL 0.5 MG/1
0.5 TABLET ORAL DAILY
COMMUNITY
Start: 2022-03-10

## 2022-03-14 NOTE — PROGRESS NOTES
No acute findings. Cervical spine arthritis. For now observe. Symptomatic treatment. Further discussion on follow up visit. If symptoms gets worse can consider ENT referral. Please let pt know. Also discuss x-ray knee result with pt.

## 2022-03-14 NOTE — PROGRESS NOTES
This is the Subsequent Medicare Annual Wellness Exam, performed 12 months or more after the Initial AWV or the last Subsequent AWV    I have reviewed the patient's medical history in detail and updated the computerized patient record. Assessment/Plan   Education and counseling provided:  Are appropriate based on today's review and evaluation  Discussed 5 years health plan. Discussed advance directive. See ACP note from today. 1. Right knee pain, unspecified chronicity  Comments:  ? Baker cyst   Orders:  -     XR KNEE RT MAX 2 VWS; Future  2. Posterior auricular pain of right ear  -     XR NECK SOFT TISSUE; Future  3. Neck pain  -     XR NECK SOFT TISSUE; Future  4. Multinodular goiter  5. Severe episode of recurrent major depressive disorder, without psychotic features ([facility] [address]  6. Fibromyalgia  7. Multiple joint pain  8.  Medicare annual wellness visit, subsequent       Depression Risk Factor Screening     3 most recent PHQ Screens 3/14/2022   PHQ Not Done -   Little interest or pleasure in doing things Not at all   Feeling down, depressed, irritable, or hopeless Several days   Total Score PHQ 2 1   Trouble falling or staying asleep, or sleeping too much -   Feeling tired or having little energy -   Poor appetite, weight loss, or overeating -   Feeling bad about yourself - or that you are a failure or have let yourself or your family down -   Trouble concentrating on things such as school, work, reading, or watching TV -   Moving or speaking so slowly that other people could have noticed; or the opposite being so fidgety that others notice -   Thoughts of being better off dead, or hurting yourself in some way -   PHQ 9 Score -   How difficult have these problems made it for you to do your work, take care of your home and get along with others -       Alcohol & Drug Abuse Risk Screen    Do you average more than 1 drink per night or more than 7 drinks a week:  No    On any one occasion in the past three months have you have had more than 3 drinks containing alcohol:  No          Functional Ability and Level of Safety    Hearing: Hearing is good. Activities of Daily Living: The home contains: no safety equipment. Patient does total self care      Ambulation: with mild difficulty. Use cane as needed      Fall Risk:  No flowsheet data found. Abuse Screen:  Patient is not abused       Cognitive Screening    Has your family/caregiver stated any concerns about your memory: no         Health Maintenance Due     Health Maintenance Due   Topic Date Due    Hepatitis C Screening  Never done    Flu Vaccine (1) 09/01/2021    COVID-19 Vaccine (3 - Booster for Pfizer series) 02/11/2022     Will take covid booster from the pharmacy. Patient Care Team   Patient Care Team:  Tanja Krueger MD as PCP - General (Family Medicine)  Tanja Krueger MD as PCP - [facility] Empaneled Provider  Chiquis Osei MD (Rheumatology)  Nicole Alva MD (Endocrinology)    History     Patient Active Problem List   Diagnosis Code    Depression, major, severe recurrence ([facility] [address] F33.2    Abnormal mammogram R92.8    Fibromyalgia M79.7    Multiple joint pain M25.50    Chronic fatigue R53.82    Multinodular goiter E04.2     Past Medical History:   Diagnosis Date    Anemia     Arthritis     Fibroids     uterine fibroids    Fibromyalgia     Menorrhagia     Psychiatric disorder     anxiety    Tobacco abuse     quit in 2020      Past Surgical History:   Procedure Laterality Date    HX HYSTERECTOMY      HX OTHER SURGICAL      surgery for punctured intestine    HX OVARIAN CYST REMOVAL Bilateral     HX TUBAL LIGATION       Current Outpatient Medications   Medication Sig Dispense Refill    estradioL (ESTRACE) 0.5 mg tablet       sertraline (ZOLOFT) 100 mg tablet SERTRALINE 100 MG TABLET 90 Tablet 0    nitroglycerin (NITROSTAT) 0.4 mg SL tablet 1 TAB BY SUBLINGUAL ROUTE EVERY FIVE MINUTES AS NEEDED FOR CHEST PAIN. UP TO 3 DOSES. CALL 911 25 Tablet 5    traZODone (DESYREL) 100 mg tablet Take 1 Tab by mouth daily. 30 Tab 1    ergocalciferol (ERGOCALCIFEROL) 1,250 mcg (50,000 unit) capsule TAKE 1 CAPSULE BY MOUTH ONE TIME PER WEEK      meloxicam (MOBIC) 15 mg tablet meloxicam 15 mg tablet      aspirin delayed-release 81 mg tablet Take 1 Tab by mouth daily.  (Patient not taking: Reported on 2022) 30 Tab 5     No Known Allergies    Family History   Problem Relation Age of Onset    Hypertension Mother     Diabetes Mother     Breast Cancer Maternal Aunt      Social History     Tobacco Use    Smoking status: Former Smoker     Packs/day: 0.25     Types: Cigarettes     Quit date:      Years since quittin.2    Smokeless tobacco: Never Used   Substance Use Topics    Alcohol use: No         Wilmer Jefferson MD

## 2022-03-14 NOTE — ACP (ADVANCE CARE PLANNING)
Advance Care Planning     General Advance Care Planning (ACP) Conversation      Date of Conversation: 3/14/2022  Conducted with: Patient with Decision Making Capacity    Healthcare Decision Maker:   No healthcare decision makers have been documented. Click here to complete [address] including selection of the Healthcare Decision Maker Relationship (ie \"Primary\")    Today we discussed advance directive. she wants to be a full code at this time.      Content/Action Overview:   see above   Reviewed DNR/DNI and patient elects Full Code (Attempt Resuscitation)      Length of Voluntary ACP Conversation in minutes:  <16 minutes (Non-Billable)    Rolf Esparza MD

## 2022-03-14 NOTE — PROGRESS NOTES
Results were sent to patient via [phone]  Patient was asked to let us know if she would like to be referred to PT.

## 2022-03-14 NOTE — PROGRESS NOTES
Results were sent to patient via [address] Patient was asked to let us know if she would like to be referred to PT. Attempted to contact patient on home/mobile number, but no answer. Left a voice message advising patient results message was sent to [phone]

## 2022-03-14 NOTE — PROGRESS NOTES
Results for knee XR and XR Neck Soft Tissue were sent to patient via [employer]. Attempted to contact patient on home/mobile number, but no answer. Left a voice message advising patient results message was sent to [phone]

## 2022-03-14 NOTE — PATIENT INSTRUCTIONS
Medicare Wellness Visit, Female     The best way to live healthy is to have a lifestyle where you eat a well-balanced diet, exercise regularly, limit alcohol use, and quit all forms of tobacco/nicotine, if applicable. Regular preventive services are another way to keep healthy. Preventive services (vaccines, screening tests, monitoring & exams) can help personalize your care plan, which helps you manage your own care. Screening tests can find health problems at the earliest stages, when they are easiest to treat. [facility] follows the current, evidence-based guidelines published by the [facility] ([facility]) when recommending preventive services for our patients. Because we follow these guidelines, sometimes recommendations change over time as research supports it. (For example, mammograms used to be recommended annually. Even though Medicare will still pay for an annual mammogram, the newer guidelines recommend a mammogram every two years for women of average risk). Of course, you and your doctor may decide to screen more often for some diseases, based on your risk and your co-morbidities (chronic disease you are already diagnosed with). Preventive services for you include:  - Medicare offers their members a free annual wellness visit, which is time for you and your primary care provider to discuss and plan for your preventive service needs. Take advantage of this benefit every year!  -All adults over the age of 72 should receive the recommended pneumonia vaccines. Current USPSTF guidelines recommend a series of two vaccines for the best pneumonia protection.   -All adults should have a flu vaccine yearly and a tetanus vaccine every 10 years.   -All adults age 48 and older should receive the shingles vaccines (series of two vaccines).       -All adults age 38-68 who are overweight should have a diabetes screening test once every three years.   -All adults born between [birth year] and 1963 should be screened once for Hepatitis C.  -Other screening tests and preventive services for persons with diabetes include: an eye exam to screen for diabetic retinopathy, a kidney function test, a foot exam, and stricter control over your cholesterol.   -Cardiovascular screening for adults with routine risk involves an electrocardiogram (ECG) at intervals determined by your doctor.   -Colorectal cancer screenings should be done for adults age 54-65 with no increased risk factors for colorectal cancer. There are a number of acceptable methods of screening for this type of cancer. Each test has its own benefits and drawbacks. Discuss with your doctor what is most appropriate for you during your annual wellness visit. The different tests include: colonoscopy (considered the best screening method), a fecal occult blood test, a fecal DNA test, and sigmoidoscopy.    -A bone mass density test is recommended when a woman turns 65 to screen for osteoporosis. This test is only recommended one time, as a screening. Some providers will use this same test as a disease monitoring tool if you already have osteoporosis. -Breast cancer screenings are recommended every other year for women of normal risk, age 54-69.  -Cervical cancer screenings for women over age 72 are only recommended with certain risk factors.      Here is a list of your current Health Maintenance items (your personalized list of preventive services) with a due date:  Health Maintenance Due   Topic Date Due    Hepatitis C Test  Never done    Yearly Flu Vaccine (1) 09/01/2021    COVID-19 Vaccine (3 - Booster for Pfizer series) 02/11/2022

## 2022-03-14 NOTE — PROGRESS NOTES
1. Have you been to the ER, urgent care clinic since your last visit? Hospitalized since your last visit? No    2. Have you seen or consulted any other health care providers outside of the [address] [place] [address] since your last visit? Include any pap smears or colon screening. Dr. Faith Jain OB/[facility] on 3/10/22 - found cyst and in discussion for surgery.     Chief Complaint   Patient presents with    Other     pain behind right ear and radiates down neck     Leg Pain     pain in calf muscle in right leg

## 2022-03-15 NOTE — PROGRESS NOTES
HISTORY OF PRESENT ILLNESS  Marco A Persaud is a 54 y.o. female. HPI: here with couple of concern. C.o post auricular pain , rt side since one month. No earache or trouble hearing. No recent ear infection. No sore throat. No cold or cough. States pain on and off. Radiates from behind the ears to rt side of neck. Not taking any medication for current problem. H/o multinodular thyroid. Following endo. No trouble swallowing or change in voice. Asymptomatic. H/o fibromyalgia, chronic pain. On and off. Fairly stable at this time. C/o pain behind rt knee. ? Baker cyst. Obtain x-ray. Not using any support while ambulating during visit. Sitting comfortable without any acute distress. Hypertriglyceridemia. Discussed importance of diet modification , exercise and weight loss. She will work towards it with more compliance. Visit Vitals  /80 (BP 1 Location: Right upper arm, BP Patient Position: Sitting, BP Cuff Size: Adult)   Pulse 68   Temp 97.7 °F (36.5 °C) (Temporal)   Resp 16   Ht 5' 5\" (1.651 m)   Wt 201 lb 6.4 oz (91.4 kg)   SpO2 97%   BMI 33.51 kg/m²     Review medication list, vitals, problem list,allergies. Lab Results   Component Value Date/Time    WBC 6.0 04/10/2021 12:00 AM    HGB 11.5 04/10/2021 12:00 AM    HCT 35.2 04/10/2021 12:00 AM    PLATELET 890 81/36/2402 12:00 AM    MCV 87 04/10/2021 12:00 AM     Lab Results   Component Value Date/Time    Sodium 143 04/10/2021 12:00 AM    Potassium 3.9 04/10/2021 12:00 AM    Chloride 107 (H) 04/10/2021 12:00 AM    CO2 22 04/10/2021 12:00 AM    Anion gap 6 05/21/2019 04:44 PM    Glucose 108 (H) 04/10/2021 12:00 AM    BUN 8 04/10/2021 12:00 AM    Creatinine 0.94 04/10/2021 12:00 AM    BUN/Creatinine ratio 9 04/10/2021 12:00 AM    GFR est AA 83 04/10/2021 12:00 AM    GFR est non-AA 72 04/10/2021 12:00 AM    Calcium 9.4 04/10/2021 12:00 AM    Bilirubin, total <0.2 04/10/2021 12:00 AM    Alk.  phosphatase 79 04/10/2021 12:00 AM    Protein, total 6.9 04/10/2021 12:00 AM    Albumin 4.1 04/10/2021 12:00 AM    Globulin 3.7 05/21/2019 04:44 PM    A-G Ratio 1.5 04/10/2021 12:00 AM    ALT (SGPT) 14 04/10/2021 12:00 AM    AST (SGOT) 22 04/10/2021 12:00 AM     Lab Results   Component Value Date/Time    Cholesterol, total 170 04/10/2021 12:00 AM    HDL Cholesterol 42 04/10/2021 12:00 AM    LDL, calculated 89 04/10/2021 12:00 AM    LDL, calculated 97 12/07/2018 12:00 AM    VLDL, calculated 39 04/10/2021 12:00 AM    VLDL, calculated 20 12/07/2018 12:00 AM    Triglyceride 230 (H) 04/10/2021 12:00 AM     Lab Results   Component Value Date/Time    TSH 0.608 04/10/2021 12:00 AM     Lab Results   Component Value Date/Time    Hemoglobin A1c 5.3 12/07/2018 12:00 AM     Lab Results   Component Value Date/Time    VITAMIN D, 25-HYDROXY 36.4 04/10/2021 12:00 AM           ROS: see HPI     Physical Exam  Constitutional:       General: She is not in acute distress. HENT:      Right Ear: Tympanic membrane, ear canal and external ear normal. There is no impacted cerumen. Ears:      Comments: No palpable posterior auricular lymph node. No signs of infection. No erythematous ear canal.   Cardiovascular:      Rate and Rhythm: Normal rate and regular rhythm. Heart sounds: Normal heart sounds. Abdominal:      General: Bowel sounds are normal.      Palpations: Abdomen is soft. Tenderness: There is no abdominal tenderness. Neurological:      Mental Status: She is oriented to person, place, and time. ASSESSMENT and PLAN    ICD-10-CM ICD-9-CM    1. Right knee pain, unspecified chronicity : no point tenderness. Review x-ray result post visit. X-ray showed arthritic changes. Will ask if she wants to do PT .  M25.561 719.46 XR KNEE RT MAX 2 VWS    ? Baker cyst    2. Posterior auricular pain of right ear  H92.01 388.70 XR NECK SOFT TISSUE   3. Neck pain : radiates from post auricular area. For now symptomatic treatment with ice. On NSAIDs. X-ray neck no acute findings.  If persist can consider ENT referral.  M54.2 723.1 XR NECK SOFT TISSUE      METABOLIC PANEL, COMPREHENSIVE   4. Multinodular goiter : asymptomatic. Will be following endo and their recommendations. E04.2 241.1 TSH 3RD GENERATION   5. Severe episode of recurrent major depressive disorder, without psychotic features ([address] : stable at this time. No mood changes. F33.2 296.33    6. Fibromyalgia : symptomatic treatment. Following rheumatology and their recommendations. M79.7 729.1    7. Multiple joint pain  M25.50 719.49    8. Medicare annual wellness visit, subsequent  Z00.00 V70.0    9. Vitamin D deficiency : recheck labs. E55.9 268.9 VITAMIN D, 25 HYDROXY   10. Need for hepatitis C screening test  Z11.59 V73.89 HEPATITIS C AB   11. Hypertriglyceridemia : diet modification. Recheck labs. E78.1 272.1 LIPID PANEL   Pt understood and agree with the plan   Review    Follow-up and Dispositions    · Return in about 3 months (around 6/14/2022). Please note that this dictation was completed with [employer], the [employer] voice recognition software. Quite often unanticipated grammatical, syntax, homophones, and other interpretive errors are inadvertently transcribed by the computer software. Please disregard these errors. Please excuse any errors that have escaped final proofreading.

## 2022-03-18 PROBLEM — R53.82 CHRONIC FATIGUE: Status: ACTIVE | Noted: 2020-03-16

## 2022-03-18 PROBLEM — E04.2 MULTINODULAR GOITER: Status: ACTIVE | Noted: 2022-03-14

## 2022-03-19 PROBLEM — M79.7 FIBROMYALGIA: Status: ACTIVE | Noted: 2020-03-16

## 2022-03-19 PROBLEM — R92.8 ABNORMAL MAMMOGRAM: Status: ACTIVE | Noted: 2020-03-16

## 2022-03-19 PROBLEM — F33.2 DEPRESSION, MAJOR, SEVERE RECURRENCE (HCC): Status: ACTIVE | Noted: 2019-12-16

## 2022-03-20 ENCOUNTER — APPOINTMENT (OUTPATIENT)
Dept: GENERAL RADIOLOGY | Age: 52
End: 2022-03-20
Attending: STUDENT IN AN ORGANIZED HEALTH CARE EDUCATION/TRAINING PROGRAM
Payer: MEDICARE

## 2022-03-20 ENCOUNTER — HOSPITAL ENCOUNTER (EMERGENCY)
Age: 52
Discharge: HOME | End: 2022-03-20
Attending: STUDENT IN AN ORGANIZED HEALTH CARE EDUCATION/TRAINING PROGRAM
Payer: MEDICARE

## 2022-03-20 VITALS
OXYGEN SATURATION: 98 % | TEMPERATURE: 98.8 F | HEIGHT: 65 IN | SYSTOLIC BLOOD PRESSURE: 115 MMHG | RESPIRATION RATE: 16 BRPM | BODY MASS INDEX: 31.16 KG/M2 | DIASTOLIC BLOOD PRESSURE: 69 MMHG | WEIGHT: 187 LBS | HEART RATE: 66 BPM

## 2022-03-20 DIAGNOSIS — G89.29 CHRONIC PAIN OF LEFT KNEE: Primary | ICD-10-CM

## 2022-03-20 DIAGNOSIS — M25.562 CHRONIC PAIN OF LEFT KNEE: Primary | ICD-10-CM

## 2022-03-20 PROBLEM — M25.50 MULTIPLE JOINT PAIN: Status: ACTIVE | Noted: 2020-03-16

## 2022-03-20 PROCEDURE — 99284 EMERGENCY DEPT VISIT MOD MDM: CPT

## 2022-03-20 PROCEDURE — 96372 THER/PROPH/DIAG INJ SC/IM: CPT

## 2022-03-20 PROCEDURE — 74011250636 HC RX REV CODE- 250/636: Performed by: STUDENT IN AN ORGANIZED HEALTH CARE EDUCATION/TRAINING PROGRAM

## 2022-03-20 RX ORDER — NAPROXEN 500 MG/1
500 TABLET ORAL 2 TIMES DAILY WITH MEALS
Qty: 20 TABLET | Refills: 0 | Status: SHIPPED | OUTPATIENT
Start: 2022-03-20 | End: 2022-05-23

## 2022-03-20 RX ORDER — KETOROLAC TROMETHAMINE 15 MG/ML
15 INJECTION, SOLUTION INTRAMUSCULAR; INTRAVENOUS ONCE
Status: COMPLETED | OUTPATIENT
Start: 2022-03-20 | End: 2022-03-20

## 2022-03-20 RX ADMIN — KETOROLAC TROMETHAMINE 15 MG: 15 INJECTION, SOLUTION INTRAMUSCULAR; INTRAVENOUS at 12:14

## 2022-03-20 NOTE — ED TRIAGE NOTES
Patient c/o acute on chronic right knee pain. C/o swelling to right knee since awakening this morning. She states being referred to physical therapy, but has not been referred to Orthopedics.

## 2022-03-20 NOTE — DISCHARGE INSTRUCTIONS
Call your primary care doctor to follow-up regarding the knee pain. Please call the orthopedic doctor so that he may evaluate your knee. Take the prescribed naproxen twice a day as needed for your pain. Continue with ice and warm packs as needed. Return for any new or worsening symptoms.

## 2022-03-20 NOTE — ED PROVIDER NOTES
EMERGENCY DEPARTMENT HISTORY AND PHYSICAL EXAM    I have evaluated the patient at 12:08 PM      Date: 3/20/2022  Patient Name: Abida Ga    History of Presenting Illness     Chief Complaint   Patient presents with    Knee Pain         History Provided By: Patient  Location/Duration/Severity/Modifying factors   19-year-old female history of arthritis presenting to the emergency department for evaluation of right knee pain. States is a chronic pain that has been ongoing for months now. She denies any new trauma or injury. States she feels like is getting worse. She has been applying heat to it without much relief. She denies taking any NSAIDs or other forms of analgesia. Denies any fevers or chills, erythema. PCP: Lucero Gandhi MD    Current Facility-Administered Medications   Medication Dose Route Frequency Provider Last Rate Last Admin    ketorolac (TORADOL) injection 15 mg  15 mg IntraMUSCular ONCE Kam Jewel, DO         Current Outpatient Medications   Medication Sig Dispense Refill    naproxen (NAPROSYN) 500 mg tablet Take 1 Tablet by mouth two (2) times daily (with meals). 20 Tablet 0    estradioL (ESTRACE) 0.5 mg tablet       sertraline (ZOLOFT) 100 mg tablet SERTRALINE 100 MG TABLET 90 Tablet 0    nitroglycerin (NITROSTAT) 0.4 mg SL tablet 1 TAB BY SUBLINGUAL ROUTE EVERY FIVE MINUTES AS NEEDED FOR CHEST PAIN. UP TO 3 DOSES. CALL 911 25 Tablet 5    traZODone (DESYREL) 100 mg tablet Take 1 Tab by mouth daily. 30 Tab 1    aspirin delayed-release 81 mg tablet Take 1 Tab by mouth daily.  30 Tab 5    ergocalciferol (ERGOCALCIFEROL) 1,250 mcg (50,000 unit) capsule TAKE 1 CAPSULE BY MOUTH ONE TIME PER WEEK      meloxicam (MOBIC) 15 mg tablet meloxicam 15 mg tablet         Past History     Past Medical History:  Past Medical History:   Diagnosis Date    Anemia     Arthritis     Fibroids     uterine fibroids    Fibromyalgia     Knee pain, right     Menorrhagia     Psychiatric disorder     anxiety    Tobacco abuse     quit in        Past Surgical History:  Past Surgical History:   Procedure Laterality Date    HX HYSTERECTOMY      HX OTHER SURGICAL      surgery for punctured intestine    HX OVARIAN CYST REMOVAL Bilateral     HX TUBAL LIGATION         Family History:  Family History   Problem Relation Age of Onset    Hypertension Mother     Diabetes Mother     Breast Cancer Maternal Aunt        Social History:  Social History     Tobacco Use    Smoking status: Former Smoker     Packs/day: 0.25     Types: Cigarettes     Quit date:      Years since quittin.2    Smokeless tobacco: Never Used   Substance Use Topics    Alcohol use: No    Drug use: No       Allergies:  No Known Allergies      Review of Systems       Review of Systems   Constitutional: Negative for activity change, chills, diaphoresis, fatigue and fever. Respiratory: Negative for cough, chest tightness, shortness of breath, wheezing and stridor. Cardiovascular: Negative for chest pain and palpitations. Gastrointestinal: Negative for abdominal distention, abdominal pain, constipation, diarrhea, nausea and vomiting. Musculoskeletal: Positive for arthralgias. Negative for back pain, joint swelling and myalgias. Right knee pain   Skin: Negative for rash and wound. Neurological: Negative for dizziness, weakness, numbness and headaches. Psychiatric/Behavioral: Negative for agitation. The patient is not nervous/anxious. Physical Exam     Visit Vitals  /69 (BP 1 Location: Left upper arm, BP Patient Position: At rest)   Pulse 66   Temp 98.8 °F (37.1 °C)   Resp 16   Ht 5' 5\" (1.651 m)   Wt 84.8 kg (187 lb)   LMP 01/15/2016   SpO2 98%   BMI 31.12 kg/m²         Physical Exam  Constitutional:       General: She is not in acute distress. Appearance: She is not toxic-appearing. HENT:      Head: Normocephalic and atraumatic.    Cardiovascular:      Rate and Rhythm: Normal rate and regular rhythm. Heart sounds: Normal heart sounds. No murmur heard. No friction rub. No gallop. Pulmonary:      Effort: Pulmonary effort is normal.      Breath sounds: Normal breath sounds. Abdominal:      General: There is no distension. Palpations: Abdomen is soft. There is no mass. Tenderness: There is no abdominal tenderness. There is no guarding. Hernia: No hernia is present. Musculoskeletal:         General: No swelling, tenderness or deformity. Comments: Knee joint stable. Minor tenderness to palpation with flexion of the knee. No palpable fluid collection   Skin:     General: Skin is warm and dry. Findings: No rash. Neurological:      General: No focal deficit present. Mental Status: She is alert and oriented to person, place, and time. Sensory: No sensory deficit. Motor: No weakness. Psychiatric:         Mood and Affect: Mood normal.           Diagnostic Study Results     Labs -  No results found for this or any previous visit (from the past 12 hour(s)). Radiologic Studies -   XR KNEE RT MIN 4 V    (Results Pending)         Medical Decision Making   I am the first provider for this patient. I reviewed the vital signs, available nursing notes, past medical history, past surgical history, family history and social history. Vital Signs-Reviewed the patient's vital signs. Records Reviewed: Nursing Notes, Old Medical Records and Previous Radiology Studies (Time of Review: 12:08 PM)    ED Course: Progress Notes, Reevaluation, and Consults:         Provider Notes (Medical Decision Making):   MDM  Number of Diagnoses or Management Options  Chronic pain of left knee  Diagnosis management comments: 45-year-old female presenting to the emergency department for evaluation of right knee pain. Likely exacerbation of her arthritis.   She had an x-ray done at her primary care doctor's 1 week ago which demonstrated degenerative changes consistent with her arthritis. No concern for septic arthritis at this time. Patient has been reassured and will be discharged home with NSAID medication. Has been instructed to follow-up with primary care doctor. She has also been given contact information with orthopedic but she may be evaluated by them. Return cautions advised. Patient verbalizes good understanding and agreement with plan peer              Diagnosis     Clinical Impression:   1. Chronic pain of left knee        Disposition: home    Follow-up Information     Follow up With Specialties Details Why Contact Info    [facility] EMERGENCY DEPT Emergency Medicine  As needed, If symptoms worsen Chandra Guzmán [address] [place], [place] [address]paul Mckeon MD Family Medicine Call in 1 day  [facility]      Asiya Lam MD Orthopedic Surgery Call in 1 day  [address]  [phone]             Patient's Medications   Start Taking    NAPROXEN (NAPROSYN) 500 MG TABLET    Take 1 Tablet by mouth two (2) times daily (with meals). Continue Taking    ASPIRIN DELAYED-RELEASE 81 MG TABLET    Take 1 Tab by mouth daily. ERGOCALCIFEROL (ERGOCALCIFEROL) 1,250 MCG (50,000 UNIT) CAPSULE    TAKE 1 CAPSULE BY MOUTH ONE TIME PER WEEK    ESTRADIOL (ESTRACE) 0.5 MG TABLET        MELOXICAM (MOBIC) 15 MG TABLET    meloxicam 15 mg tablet    NITROGLYCERIN (NITROSTAT) 0.4 MG SL TABLET    1 TAB BY SUBLINGUAL ROUTE EVERY FIVE MINUTES AS NEEDED FOR CHEST PAIN. UP TO 3 DOSES. CALL 911    SERTRALINE (ZOLOFT) 100 MG TABLET    SERTRALINE 100 MG TABLET    TRAZODONE (DESYREL) 100 MG TABLET    Take 1 Tab by mouth daily. These Medications have changed    No medications on file   Stop Taking    No medications on file     Disclaimer: Sections of this note are dictated using utilizing voice recognition software. Minor typographical errors may be present.  If questions arise, please do not hesitate to contact me or call our department.

## 2022-03-22 DIAGNOSIS — R51.9 POSTAURICULAR PAIN: Primary | ICD-10-CM

## 2022-03-31 PROBLEM — N94.89 ADNEXAL MASS: Status: ACTIVE | Noted: 2022-03-31

## 2022-05-23 ENCOUNTER — HOSPITAL ENCOUNTER (EMERGENCY)
Age: 52
Discharge: HOME | End: 2022-05-23
Attending: EMERGENCY MEDICINE
Payer: MEDICARE

## 2022-05-23 VITALS
DIASTOLIC BLOOD PRESSURE: 68 MMHG | WEIGHT: 192 LBS | RESPIRATION RATE: 16 BRPM | SYSTOLIC BLOOD PRESSURE: 105 MMHG | BODY MASS INDEX: 32.78 KG/M2 | HEIGHT: 64 IN | HEART RATE: 74 BPM | TEMPERATURE: 98.9 F | OXYGEN SATURATION: 100 %

## 2022-05-23 DIAGNOSIS — T14.8XXA SUPERFICIAL BRUISING: ICD-10-CM

## 2022-05-23 DIAGNOSIS — L02.214 ABSCESS OF LEFT GROIN: Primary | ICD-10-CM

## 2022-05-23 LAB
ALBUMIN SERPL-MCNC: 3.6 G/DL (ref 3.4–5)
ALBUMIN/GLOB SERPL: 0.9 {RATIO} (ref 0.8–1.7)
ALP SERPL-CCNC: 72 U/L (ref 45–117)
ALT SERPL-CCNC: 25 U/L (ref 13–56)
ANION GAP SERPL CALC-SCNC: 4 MMOL/L (ref 3–18)
APTT PPP: 37.4 SEC (ref 23–36.4)
AST SERPL-CCNC: 23 U/L (ref 10–38)
BASOPHILS # BLD: 0 K/UL (ref 0–0.1)
BASOPHILS NFR BLD: 1 % (ref 0–2)
BILIRUB SERPL-MCNC: 0.3 MG/DL (ref 0.2–1)
BUN SERPL-MCNC: 11 MG/DL (ref 7–18)
BUN/CREAT SERPL: 14 (ref 12–20)
CALCIUM SERPL-MCNC: 9 MG/DL (ref 8.5–10.1)
CHLORIDE SERPL-SCNC: 108 MMOL/L (ref 100–111)
CO2 SERPL-SCNC: 29 MMOL/L (ref 21–32)
CREAT SERPL-MCNC: 0.8 MG/DL (ref 0.6–1.3)
DIFFERENTIAL METHOD BLD: ABNORMAL
EOSINOPHIL # BLD: 0.1 K/UL (ref 0–0.4)
EOSINOPHIL NFR BLD: 2 % (ref 0–5)
ERYTHROCYTE [DISTWIDTH] IN BLOOD BY AUTOMATED COUNT: 11.9 % (ref 11.6–14.5)
GLOBULIN SER CALC-MCNC: 4.1 G/DL (ref 2–4)
GLUCOSE SERPL-MCNC: 105 MG/DL (ref 74–99)
HCT VFR BLD AUTO: 33.8 % (ref 35–45)
HGB BLD-MCNC: 11.1 G/DL (ref 12–16)
IMM GRANULOCYTES # BLD AUTO: 0 K/UL (ref 0–0.04)
IMM GRANULOCYTES NFR BLD AUTO: 1 % (ref 0–0.5)
INR PPP: 0.9 (ref 0.8–1.2)
LYMPHOCYTES # BLD: 2.6 K/UL (ref 0.9–3.6)
LYMPHOCYTES NFR BLD: 48 % (ref 21–52)
MCH RBC QN AUTO: 28.2 PG (ref 24–34)
MCHC RBC AUTO-ENTMCNC: 32.8 G/DL (ref 31–37)
MCV RBC AUTO: 86 FL (ref 78–100)
MONOCYTES # BLD: 0.6 K/UL (ref 0.05–1.2)
MONOCYTES NFR BLD: 10 % (ref 3–10)
NEUTS SEG # BLD: 2.2 K/UL (ref 1.8–8)
NEUTS SEG NFR BLD: 39 % (ref 40–73)
NRBC # BLD: 0 K/UL (ref 0–0.01)
NRBC BLD-RTO: 0 PER 100 WBC
PLATELET # BLD AUTO: 308 K/UL (ref 135–420)
PMV BLD AUTO: 11 FL (ref 9.2–11.8)
POTASSIUM SERPL-SCNC: 3.7 MMOL/L (ref 3.5–5.5)
PROT SERPL-MCNC: 7.7 G/DL (ref 6.4–8.2)
PROTHROMBIN TIME: 12.2 SEC (ref 11.5–15.2)
RBC # BLD AUTO: 3.93 M/UL (ref 4.2–5.3)
SODIUM SERPL-SCNC: 141 MMOL/L (ref 136–145)
WBC # BLD AUTO: 5.5 K/UL (ref 4.6–13.2)

## 2022-05-23 PROCEDURE — 85730 THROMBOPLASTIN TIME PARTIAL: CPT

## 2022-05-23 PROCEDURE — 80053 COMPREHEN METABOLIC PANEL: CPT

## 2022-05-23 PROCEDURE — 85025 COMPLETE CBC W/AUTO DIFF WBC: CPT

## 2022-05-23 PROCEDURE — 85610 PROTHROMBIN TIME: CPT

## 2022-05-23 PROCEDURE — 99283 EMERGENCY DEPT VISIT LOW MDM: CPT

## 2022-05-23 RX ORDER — SULFAMETHOXAZOLE AND TRIMETHOPRIM 800; 160 MG/1; MG/1
1 TABLET ORAL 2 TIMES DAILY
Qty: 14 TABLET | Refills: 0 | Status: SHIPPED | OUTPATIENT
Start: 2022-05-23 | End: 2022-05-30

## 2022-05-23 RX ORDER — QUETIAPINE FUMARATE 25 MG/1
25 TABLET, FILM COATED ORAL DAILY
COMMUNITY

## 2022-05-23 NOTE — ED TRIAGE NOTES
Patient c/o bruising to bilateral anterior thighs. C/o numbness sensation to left thigh. C/o \"knot\" to left groin. Advises that she had surgery on May 3, 2022 to remove both ovaries and tubes.

## 2022-05-23 NOTE — DISCHARGE INSTRUCTIONS
If you were prescribed any medication take as directed. Do not drive or use heavy equipment if prescribed narcotics. Follow up with your primary care physician or with specialist as directed. Return to the emergency room with any new or worsening conditions. Yes

## 2022-06-01 NOTE — PROGRESS NOTES
Let pt know that mammogram is ok. Yearly mammogram recommended. Thanks.
Letter sent 1/20/22
Fluid sent for chemistry/Fluid sent for cytology/Fluid sent for gram stain and culture

## 2022-06-14 ENCOUNTER — HOSPITAL ENCOUNTER (OUTPATIENT)
Dept: LAB | Age: 52
Discharge: HOME | End: 2022-06-14

## 2022-06-14 ENCOUNTER — OFFICE VISIT (OUTPATIENT)
Dept: FAMILY MEDICINE CLINIC | Age: 52
End: 2022-06-14
Payer: MEDICARE

## 2022-06-14 VITALS
SYSTOLIC BLOOD PRESSURE: 114 MMHG | WEIGHT: 200.4 LBS | TEMPERATURE: 98 F | BODY MASS INDEX: 33.39 KG/M2 | DIASTOLIC BLOOD PRESSURE: 72 MMHG | HEART RATE: 70 BPM | HEIGHT: 65 IN | RESPIRATION RATE: 16 BRPM | OXYGEN SATURATION: 97 %

## 2022-06-14 DIAGNOSIS — F32.89 OTHER DEPRESSION: ICD-10-CM

## 2022-06-14 DIAGNOSIS — E04.2 MULTINODULAR GOITER: ICD-10-CM

## 2022-06-14 DIAGNOSIS — M54.2 NECK PAIN: ICD-10-CM

## 2022-06-14 DIAGNOSIS — Z11.59 NEED FOR HEPATITIS C SCREENING TEST: ICD-10-CM

## 2022-06-14 DIAGNOSIS — G47.9 TROUBLE IN SLEEPING: ICD-10-CM

## 2022-06-14 DIAGNOSIS — E55.9 VITAMIN D DEFICIENCY: ICD-10-CM

## 2022-06-14 DIAGNOSIS — Z90.722 S/P BSO (STATUS POST BILATERAL SALPINGO-OOPHORECTOMY): ICD-10-CM

## 2022-06-14 DIAGNOSIS — M79.7 FIBROMYALGIA: ICD-10-CM

## 2022-06-14 DIAGNOSIS — R31.29 MICROSCOPIC HEMATURIA: ICD-10-CM

## 2022-06-14 DIAGNOSIS — E78.1 HYPERTRIGLYCERIDEMIA: ICD-10-CM

## 2022-06-14 DIAGNOSIS — M25.50 MULTIPLE JOINT PAIN: Primary | ICD-10-CM

## 2022-06-14 PROBLEM — F32.A DEPRESSION: Status: ACTIVE | Noted: 2022-06-14

## 2022-06-14 PROBLEM — N94.89 ADNEXAL MASS: Status: RESOLVED | Noted: 2022-03-31 | Resolved: 2022-06-14

## 2022-06-14 LAB — SENTARA SPECIMEN COL,SENBCF: NORMAL

## 2022-06-14 PROCEDURE — 99214 OFFICE O/P EST MOD 30 MIN: CPT | Performed by: FAMILY MEDICINE

## 2022-06-14 PROCEDURE — G8427 DOCREV CUR MEDS BY ELIG CLIN: HCPCS | Performed by: FAMILY MEDICINE

## 2022-06-14 PROCEDURE — 3017F COLORECTAL CA SCREEN DOC REV: CPT | Performed by: FAMILY MEDICINE

## 2022-06-14 PROCEDURE — G9717 DOC PT DX DEP/BP F/U NT REQ: HCPCS | Performed by: FAMILY MEDICINE

## 2022-06-14 PROCEDURE — 99001 SPECIMEN HANDLING PT-LAB: CPT

## 2022-06-14 PROCEDURE — G8417 CALC BMI ABV UP PARAM F/U: HCPCS | Performed by: FAMILY MEDICINE

## 2022-06-14 RX ORDER — MELOXICAM 7.5 MG/1
1 TABLET ORAL 2 TIMES DAILY
COMMUNITY
Start: 2022-05-05

## 2022-06-14 RX ORDER — TRAZODONE HYDROCHLORIDE 100 MG/1
100 TABLET ORAL
Qty: 90 TABLET | Refills: 0 | Status: SHIPPED | OUTPATIENT
Start: 2022-06-14 | End: 2022-08-02

## 2022-06-14 NOTE — PATIENT INSTRUCTIONS
A Healthy Lifestyle: Care Instructions  Your Care Instructions     A healthy lifestyle can help you feel good, stay at a healthy weight, and have plenty of energy for both work and play. A healthy lifestyle is something you can share with your whole family. A healthy lifestyle also can lower your risk for serious health problems, such as high blood pressure, heart disease, and diabetes. You can follow a few steps listed below to improve your health and the health of your family. Follow-up care is a key part of your treatment and safety. Be sure to make and go to all appointments, and call your doctor if you are having problems. It's also a good idea to know your test results and keep a list of the medicines you take. How can you care for yourself at home? · Do not eat too much sugar, fat, or fast foods. You can still have dessert and treats now and then. The goal is moderation. · Start small to improve your eating habits. Pay attention to portion sizes, drink less juice and soda pop, and eat more fruits and vegetables. ? Eat a healthy amount of food. A 3-ounce serving of meat, for example, is about the size of a deck of cards. Fill the rest of your plate with vegetables and whole grains. ? Limit the amount of soda and sports drinks you have every day. Drink more water when you are thirsty. ? Eat plenty of fruits and vegetables every day. Have an apple or some carrot sticks as an afternoon snack instead of a candy bar. Try to have fruits and/or vegetables at every meal.  · Make exercise part of your daily routine. You may want to start with simple activities, such as walking, bicycling, or slow swimming. Try to be active 30 to 60 minutes every day. You do not need to do all 30 to 60 minutes all at once. For example, you can exercise 3 times a day for 10 or 20 minutes.  Moderate exercise is safe for most people, but it is always a good idea to talk to your doctor before starting an exercise program.  · Keep moving. Myesha Hamper the lawn, work in the garden, or [employer]. Take the stairs instead of the elevator at work. · If you smoke, quit. People who smoke have an increased risk for heart attack, stroke, cancer, and other lung illnesses. Quitting is hard, but there are ways to boost your chance of quitting tobacco for good. ? Use nicotine gum, patches, or lozenges. ? Ask your doctor about stop-smoking programs and medicines. ? Keep trying. In addition to reducing your risk of diseases in the future, you will notice some benefits soon after you stop using tobacco. If you have shortness of breath or asthma symptoms, they will likely get better within a few weeks after you quit. · Limit how much alcohol you drink. Moderate amounts of alcohol (up to 2 drinks a day for men, 1 drink a day for women) are okay. But drinking too much can lead to liver problems, high blood pressure, and other health problems. Family health  If you have a family, there are many things you can do together to improve your health. · Eat meals together as a family as often as possible. · Eat healthy foods. This includes fruits, vegetables, lean meats and dairy, and whole grains. · Include your family in your fitness plan. Most people think of activities such as jogging or tennis as the way to fitness, but there are many ways you and your family can be more active. Anything that makes you breathe hard and gets your heart pumping is exercise. Here are some tips:  ? Walk to do errands or to take your child to school or the bus.  ? Go for a family bike ride after dinner instead of watching TV. Where can you learn more? Go to http://www.gray.com/  Enter H478 in the search box to learn more about \"A Healthy Lifestyle: Care Instructions. \"  Current as of: June 16, 2021               Content Version: 13.2  © 2667-3973 Healthwise, Incorporated.    Care instructions adapted under license by [facility] (which disclaims liability or warranty for this information). If you have questions about a medical condition or this instruction, always ask your healthcare professional. [address] any warranty or liability for your use of this information. Fibromyalgia: Care Instructions  Overview     Fibromyalgia is a painful condition that is not completely understood by medical experts. The cause of fibromyalgia is not known. It can make you feel tired and ache all over. It causes tender spots at specific points of the body that hurt only when you press on them. You may have trouble sleeping, as well as other symptoms. These problems can upset your work and home life. Symptoms tend to come and go, although they may never go away completely. Fibromyalgia does not harm your muscles, joints, or organs. Follow-up care is a key part of your treatment and safety. Be sure to make and go to all appointments, and call your doctor if you are having problems. It's also a good idea to know your test results and keep a list of the medicines you take. How can you care for yourself at home? · Exercise often. Walk, swim, or bike to help with pain and sleep problems and to make you feel better. · Try to get a good night's sleep. Go to bed and get up at the same time each day, whether you feel rested or not. Make sure you have a good mattress and pillow. · Reduce stress. Avoid things that cause you stress, if you can. If not, work at making them less stressful. Learn to use biofeedback, guided imagery, meditation, or other methods to relax. · Make healthy changes. Eat a balanced diet, quit smoking, and limit alcohol and caffeine. · Use a heating pad set on low or take warm baths or showers for pain. Using cold packs for up to 20 minutes at a time can also relieve pain. Put a thin cloth between the cold pack and your skin. A gentle massage might help too. · Be safe with medicines.  Take your medicines exactly as prescribed. Call your doctor if you think you are having a problem with your medicine. Your doctor may talk to you about taking antidepressant medicines. These medicines may improve sleep, relieve pain, and in some cases treat depression. · Learn about fibromyalgia. This makes coping easier. Then, take an active role in your treatment. · Think about joining a support group with others who have fibromyalgia to learn more and get support. When should you call for help? Watch closely for changes in your health, and be sure to contact your doctor if:    · You feel sad, helpless, or hopeless; lose interest in things you used to enjoy; or have other symptoms of depression.     · Your fibromyalgia symptoms get worse. Where can you learn more? Go to http://www.gray.com/  Enter V003 in the search box to learn more about \"Fibromyalgia: Care Instructions. \"  Current as of: December 13, 2021               Content Version: 13.2  © 2187-7963 "[employer]. Care instructions adapted under license by [employer] (which disclaims liability or warranty for this information). If you have questions about a medical condition or this instruction, always ask your healthcare professional. [address] any warranty or liability for your use of this information.

## 2022-06-14 NOTE — PROGRESS NOTES
1. Have you been to the ER, urgent care clinic since your last visit? Hospitalized since your last visit? Multiple ED visits between 3/20/22 - 5/23/22    2. Have you seen or consulted any other health care providers outside of the [address] [place] [address] since your last visit? Include any pap smears or colon screening. No      3. For patients aged 39-70: Has the patient had a colonoscopy / FIT/ Cologuard? Yes 11/19/2020 colonoscopy    If the patient is female:    4. For patients aged 41-77: Has the patient had a mammogram within the past 2 years? Yes 1/19/22      5. For patients aged 21-65: Has the patient had a pap smear?  N/A - hysterectomy

## 2022-06-14 NOTE — PROGRESS NOTES
HISTORY OF PRESENT ILLNESS  Teri Dove is a 50 y.o. female. HPI: here for follow up. History of multiple joint pain and fibromyalgia which is fairly stable at this time. Taking meloxicam.  No side effects. Sitting comfortable without any acute distress. Vitals been stable. Recently went through bilateral salpingo-oophorectomy for adnexal mass. Surgery went well without complications. Recent ER visit for boil which has resolved at this time as well. She had a bruise from the straps over both eyes which has been resolving gradually. Denies any headache, dizziness, no chest pain or trouble breathing, no arm or leg weakness. No nausea or vomiting, no weight or appetite changes, no mood changes . No urine or bowel complains, no palpitation, no diaphoresis. No abdominal pain. No cold or cough. No leg swelling. No fever. No sleep trouble. Multinodular goiter. Following Endo. No trouble swallowing or change in voice. History of depression. On medication. Taking it with compliance. Trazodone is helping for sleep. No other changes. No thoughts of hurting herself or someone else. Following psychiatrist and therapist.  Alexandra Patel to take the medication refills from the specialist and she agrees with it. Visit Vitals  /72 (BP 1 Location: Left upper arm, BP Patient Position: Sitting, BP Cuff Size: Adult)   Pulse 70   Temp 98 °F (36.7 °C) (Temporal)   Resp 16   Ht 5' 5\" (1.651 m)   Wt 200 lb 6.4 oz (90.9 kg)   SpO2 97%   BMI 33.35 kg/m²     Review medication list, vitals, problem list,allergies.    Lab Results   Component Value Date/Time    WBC 5.5 05/23/2022 12:25 PM    HGB 11.1 (L) 05/23/2022 12:25 PM    HCT 33.8 (L) 05/23/2022 12:25 PM    PLATELET 446 67/99/8175 12:25 PM    MCV 86.0 05/23/2022 12:25 PM     Lab Results   Component Value Date/Time    Sodium 141 05/23/2022 12:25 PM    Potassium 3.7 05/23/2022 12:25 PM    Chloride 108 05/23/2022 12:25 PM    CO2 29 05/23/2022 12:25 PM    Anion gap 4 05/23/2022 12:25 PM    Glucose 105 (H) 05/23/2022 12:25 PM    BUN 11 05/23/2022 12:25 PM    Creatinine 0.80 05/23/2022 12:25 PM    BUN/Creatinine ratio 14 05/23/2022 12:25 PM    GFR est AA >60 05/23/2022 12:25 PM    GFR est non-AA >60 05/23/2022 12:25 PM    Calcium 9.0 05/23/2022 12:25 PM    Bilirubin, total 0.3 05/23/2022 12:25 PM    Alk. phosphatase 72 05/23/2022 12:25 PM    Protein, total 7.7 05/23/2022 12:25 PM    Albumin 3.6 05/23/2022 12:25 PM    Globulin 4.1 (H) 05/23/2022 12:25 PM    A-G Ratio 0.9 05/23/2022 12:25 PM    ALT (SGPT) 25 05/23/2022 12:25 PM    AST (SGOT) 23 05/23/2022 12:25 PM     Lab Results   Component Value Date/Time    Cholesterol, total 170 04/10/2021 12:00 AM    HDL Cholesterol 42 04/10/2021 12:00 AM    LDL, calculated 89 04/10/2021 12:00 AM    LDL, calculated 97 12/07/2018 12:00 AM    VLDL, calculated 39 04/10/2021 12:00 AM    VLDL, calculated 20 12/07/2018 12:00 AM    Triglyceride 230 (H) 04/10/2021 12:00 AM     Lab Results   Component Value Date/Time    TSH 0.608 04/10/2021 12:00 AM     Lab Results   Component Value Date/Time    Hemoglobin A1c 5.3 12/07/2018 12:00 AM     Lab Results   Component Value Date/Time    VITAMIN D, 25-HYDROXY 36.4 04/10/2021 12:00 AM           ROS: see HPI     Physical Exam  Constitutional:       General: She is not in acute distress. Cardiovascular:      Rate and Rhythm: Normal rate and regular rhythm. Heart sounds: Normal heart sounds. Abdominal:      General: Bowel sounds are normal.      Palpations: Abdomen is soft. Tenderness: There is no abdominal tenderness. Musculoskeletal:         General: No swelling. Cervical back: Neck supple. Neurological:      Mental Status: She is oriented to person, place, and time. Psychiatric:         Behavior: Behavior normal.         ASSESSMENT and PLAN    ICD-10-CM ICD-9-CM    1. Multiple joint pain : stable. Not following rheumatology at this time. Will observe. M25.50 719.49    2.  Trouble in sleeping : stable on trazodone. Given medication refill. Advise to ask next refill with psychiatrist.  G47.9 780.50 traZODone (DESYREL) 100 mg tablet   3. Fibromyalgia : stable on current symptomatic treatment. M79.7 729.1    4. Multinodular goiter : following endo. Asymptomatic. Will follow speciality recommendations. E04.2 241.1    5. S/P BSO (status post bilateral salpingo-oophorectomy)  Z90.722 V45.77    6. Other depression : following psychiatrist and therapist. Will follow their recommendations. F32.89 311    7. Post auricular pain: left side. Seen ENT . For now advised to observe. 8. Microscopic hematuria: has coming up appt with urology. Pt understood and agree with the plan     Follow-up and Dispositions    · Return in about 6 months (around 12/14/2022).

## 2022-06-16 LAB
25(OH)D3+25(OH)D2 SERPL-MCNC: 31.6 NG/ML (ref 30–100)
ALBUMIN SERPL-MCNC: 4.4 G/DL (ref 3.8–4.8)
ALBUMIN/GLOB SERPL: 1.6 {RATIO} (ref 1.2–2.2)
ALP SERPL-CCNC: 77 IU/L (ref 44–121)
ALT SERPL-CCNC: 13 IU/L (ref 0–32)
AST SERPL-CCNC: 20 IU/L (ref 0–40)
BILIRUB SERPL-MCNC: <0.2 MG/DL (ref 0–1.2)
BUN SERPL-MCNC: 9 MG/DL (ref 6–24)
BUN/CREAT SERPL: 10 (ref 9–23)
CALCIUM SERPL-MCNC: 9.4 MG/DL (ref 8.7–10.2)
CHLORIDE SERPL-SCNC: 102 MMOL/L (ref 96–106)
CHOLEST SERPL-MCNC: 217 MG/DL (ref 100–199)
CO2 SERPL-SCNC: 25 MMOL/L (ref 20–29)
CREAT SERPL-MCNC: 0.89 MG/DL (ref 0.57–1)
EGFR: 79 ML/MIN/1.73
GLOBULIN SER CALC-MCNC: 2.7 G/DL (ref 1.5–4.5)
GLUCOSE SERPL-MCNC: 103 MG/DL (ref 65–99)
HCV AB S/CO SERPL IA: <0.1 S/CO RATIO (ref 0–0.9)
HDLC SERPL-MCNC: 48 MG/DL
IMP & REVIEW OF LAB RESULTS: NORMAL
LDLC SERPL CALC-MCNC: 128 MG/DL (ref 0–99)
POTASSIUM SERPL-SCNC: 3.9 MMOL/L (ref 3.5–5.2)
PROT SERPL-MCNC: 7.1 G/DL (ref 6–8.5)
SODIUM SERPL-SCNC: 142 MMOL/L (ref 134–144)
TRIGL SERPL-MCNC: 229 MG/DL (ref 0–149)
TSH SERPL DL<=0.005 MIU/L-ACNC: 0.59 UIU/ML (ref 0.45–4.5)
VLDLC SERPL CALC-MCNC: 41 MG/DL (ref 5–40)

## 2022-06-17 NOTE — PROGRESS NOTES
Lab showed elevated lipid panel compare to before. Also elevated triglyceride. More compliance with diet modification. Low fat and low carb diet. Exercise as tolerated and need to loose weight. Further discussion on follow up visit and will order labs during follow up visit.

## 2022-06-30 NOTE — PROGRESS NOTES
Spoke with patient. She was advised that labs showed elevated lipid panel compare to before and elevated triglyceride. More compliance with diet modification-low fat and low carb diet. Exercise as tolerated and need to lose weight. Further discussion on follow up visit and will order labs during follow up visit.

## 2022-07-07 NOTE — PROGRESS NOTES
As discussed, please check with her insurance. This is an administrative issue rather than a medical one from.  My notes more than justify medical necessity for this test.
Reason for denial was [facility] does not accept her insurance.
3

## 2022-09-14 ENCOUNTER — OFFICE VISIT (OUTPATIENT)
Dept: ORTHOPEDIC SURGERY | Age: 52
End: 2022-09-14
Payer: MEDICARE

## 2022-09-14 VITALS
HEIGHT: 65 IN | WEIGHT: 200 LBS | BODY MASS INDEX: 33.32 KG/M2 | TEMPERATURE: 97.7 F | DIASTOLIC BLOOD PRESSURE: 88 MMHG | SYSTOLIC BLOOD PRESSURE: 138 MMHG

## 2022-09-14 DIAGNOSIS — G56.02 LEFT CARPAL TUNNEL SYNDROME: Primary | ICD-10-CM

## 2022-09-14 DIAGNOSIS — G56.02 LEFT CARPAL TUNNEL SYNDROME: ICD-10-CM

## 2022-09-14 DIAGNOSIS — G56.03 BILATERAL CARPAL TUNNEL SYNDROME: ICD-10-CM

## 2022-09-14 DIAGNOSIS — Z01.818 PREOP EXAMINATION: Primary | ICD-10-CM

## 2022-09-14 PROCEDURE — 3017F COLORECTAL CA SCREEN DOC REV: CPT | Performed by: ORTHOPAEDIC SURGERY

## 2022-09-14 PROCEDURE — G9717 DOC PT DX DEP/BP F/U NT REQ: HCPCS | Performed by: ORTHOPAEDIC SURGERY

## 2022-09-14 PROCEDURE — 99214 OFFICE O/P EST MOD 30 MIN: CPT | Performed by: ORTHOPAEDIC SURGERY

## 2022-09-14 PROCEDURE — G8427 DOCREV CUR MEDS BY ELIG CLIN: HCPCS | Performed by: ORTHOPAEDIC SURGERY

## 2022-09-14 PROCEDURE — G8417 CALC BMI ABV UP PARAM F/U: HCPCS | Performed by: ORTHOPAEDIC SURGERY

## 2022-09-14 NOTE — PROGRESS NOTES
Tracy Smith is a 50 y.o. female right handed individual, not currently working. Worker's Compensation and legal considerations: not known. Vitals:    09/14/22 0848   Temp: 97.7 °F (36.5 °C)   TempSrc: Temporal   Weight: 200 lb (90.7 kg)   Height: 5' 5\" (1.651 m)   PainSc:   5   PainLoc: Hand   LMP: 01/15/2016           Chief Complaint   Patient presents with    Hand Pain     Bilat        HPI: Patient presents today for follow-up of bilateral carpal tunnel syndrome. She would like to set up surgery for the left side first.  She is not sure yet exactly when she would be able to do it given some surgeries that her mother will be going through. 10/16/2019 HPI: Patient comes in today for follow-up regarding her bilateral carpal tunnel syndrome. She also has a history of fibromyalgia that she is on Cymbalta for. She previously has had bilateral carpal tunnel injections in the past but not at the last visit. We discussed surgery previously but she is not ready to have that due to personal and family issues. She would like bilateral carpal tunnel injections today. She has also been wearing her splint at night bilaterally. 8/14/2019 HPI: Patient comes in today for follow-up after seeing rheumatology. She was placed on Cymbalta yesterday for fibromyalgia. She reports some continued numbness and tingling in her fingers that she was diagnosed with bilateral carpal tunnel syndrome recently. She says it comes and goes. She would like to see how  the Cymbalta improves her symptoms for proceeding with an injection. 7/17/2019 HPI: Patient comes in today for follow-up regarding her bilateral hand numbness and tingling. She also has an appointment coming up with a rheumatologist next week for possible evaluation for an inflammatory arthropathy. She had injections last time and bilateral carpal tunnels that she said helped for a couple weeks but not much beyond that.     Initial HPI: Patient comes in today with complaints of bilateral hand numbness and tingling that was recently diagnosed as positive for carpal tunnel syndrome bilaterally. She reports that in January when she was lifting something while working at [employer] she noticed pain and numbness in both hands that has not relented. She denies any specific injuries. Date of onset:  1/2019    Injury: No    Prior Treatment:  Yes: Comment: Bilateral carpal tunnel braces and injections    Numbness/ Tingling: Yes: Comment: Bilateral hands    ROS: Review of Systems - General ROS: negative  Respiratory ROS: no cough, shortness of breath, or wheezing  Cardiovascular ROS: no chest pain or dyspnea on exertion  Musculoskeletal ROS: positive for - pain in hand - bilateral  Neurological ROS: positive for - numbness/tingling  Dermatological ROS: negative    Past Medical History:   Diagnosis Date    Anemia     Arthritis     Autoimmune disease ([address]     Fibroids     uterine fibroids    Fibromyalgia     Fibromyalgia     Knee pain, right     Menorrhagia     Psychiatric disorder     anxiety    Thyroid disease     ENLARGED THYROID    Tobacco abuse     quit in 2020       Past Surgical History:   Procedure Laterality Date    HX HYSTERECTOMY      HX OTHER SURGICAL      surgery for punctured intestine    HX OVARIAN CYST REMOVAL Bilateral     HX SALPINGO-OOPHORECTOMY Bilateral     HX TUBAL LIGATION         Current Outpatient Medications   Medication Sig Dispense Refill    traZODone (DESYREL) 100 mg tablet TAKE 1 TABLET BY MOUTH NIGHTLY 90 Tablet 1    cholecalciferol (VITAMIN D3) (5000 Units/125 mcg) tab tablet Take  by mouth daily. meloxicam (MOBIC) 7.5 mg tablet Take 1 Tablet by mouth two (2) times a day. QUEtiapine (SEROquel) 25 mg tablet Take 25 mg by mouth daily. estradioL (ESTRACE) 0.5 mg tablet Take 0.5 mg by mouth daily. ergocalciferol (ERGOCALCIFEROL) 1,250 mcg (50,000 unit) capsule Take 50,000 Units by mouth every seven (7) days.          No Known Allergies      PE:     Physical Exam  Vitals and nursing note reviewed. Constitutional:       General: She is not in acute distress. Appearance: Normal appearance. She is not ill-appearing, toxic-appearing or diaphoretic. HENT:      Head: Normocephalic and atraumatic. Nose: Nose normal.      Mouth/Throat:      Mouth: Mucous membranes are moist.   Eyes:      Extraocular Movements: Extraocular movements intact. Pupils: Pupils are equal, round, and reactive to light. Cardiovascular:      Pulses: Normal pulses. Pulmonary:      Effort: Pulmonary effort is normal. No respiratory distress. Abdominal:      General: Abdomen is flat. There is no distension. Musculoskeletal:         General: No swelling, tenderness, deformity or signs of injury. Normal range of motion. Cervical back: Normal range of motion and neck supple. Right lower leg: No edema. Left lower leg: No edema. Skin:     General: Skin is warm and dry. Capillary Refill: Capillary refill takes less than 2 seconds. Findings: No bruising or erythema. Neurological:      General: No focal deficit present. Mental Status: She is alert and oriented to person, place, and time. Psychiatric:         Mood and Affect: Mood normal.         Behavior: Behavior normal.        NEUROVASCULAR    Examination L R Examination L R   Carpal Comp. + + Pronator Comp. - -   Carpal Tinel + + Pronator Tinel - -   Phalen's + + Pronator Stress - -   Cubital Comp. - - Guyon Comp. - -   Cubital Tinel - - Guyon Tinel - -   Elbow Hyperflexion - - Adson's - -   Spurling's - - SC Comp. - -   PCB Median abn - - SC Tinel - -   Radial Tinel - - IC Comp.  - -   Digital Tinel - - IC Tinel - -   Radial 2-Pt WNL WNL Ulnar 2-Pt WNL WNL     Radial Pulse: 2+  Capillary Refill: < 2 sec  John: Not Performed  [employer]: Not Performed    Imaging: None Indicated today    NCS/EMG FINDINGS:     Evaluation of the Left median motor and the Right median motor nerves showed decreased conduction velocity (Elbow-Wrist, L48.9, R46.0 m/s). The Left ulnar motor, the Right ulnar motor, the Left ulnar sensory, and the Right ulnar sensory nerves were unremarkable. The Left Median 2nd Digit sensory and the Right Median 2nd Digit sensory nerves showed prolonged distal peak latency (L5.0, R5.0 ms) and decreased conduction velocity (Wrist-2nd Digit, L34.2, R33.3 m/s). INTERPRETATION:         Impression: This was an abnormal nerve conduction and EMG study showing there to be prolongation of the distal latencies in both median nerves. This is consistent with bilateral carpal tunnel syndrome. ICD-10-CM ICD-9-CM    1. Left carpal tunnel syndrome  G56.02 354.0 SCHEDULE SURGERY      2. Bilateral carpal tunnel syndrome  G56.03 354.0 AMB SUPPLY ORDER          Plan:     Schedule left carpal tunnel release    Bilateral carpal tunnel braces supplied today. Follow-up and Dispositions    Return for postop.           Plan was reviewed with patient, who verbalized agreement and understanding of the plan

## 2022-09-14 NOTE — LETTER
9/14/2022    Patient: Nemo Burks   YOB: 1970   Date of Visit: 9/14/2022     Dariusz Colin   [address]  [address] [facility] [address]  [address] [facility] [address]    Dear Jose Quiles MD,      Thank you for referring Ms. Nemo Burks to [address] [place] [address] for evaluation. My notes for this consultation are attached. If you have questions, please do not hesitate to call me. I look forward to following your patient along with you.       Sincerely,    Crow Donnelly, DO

## 2022-10-27 ENCOUNTER — TELEPHONE (OUTPATIENT)
Dept: FAMILY MEDICINE CLINIC | Age: 52
End: 2022-10-27

## 2022-10-27 NOTE — TELEPHONE ENCOUNTER
Patient (two identifiers verified) called to advise she had vertigo this morning. She was driving her son to work and started to feel dizzy. Patient had to pull car over and let her son drive the remainder of the way to his job. Patient then called her father to come pick her up from son's job, because she did not want to drive due to dizziness. She reports feeling nauseated this morning, as well. Patient does not check blood pressure at home. She denies cough, SOB and palpitations. Patient was advised to go to urgent care/ED for evaluation of symptoms. She verbalized understanding; stated she will get her cousin to drive her to [facility]/ED. Patient is aware Dr. Eboni Johnston is out of the office today, but I will send a message to provider. Patient was instructed to follow urgent care/ED recommendations and to return call to our office tomorrow to let us know how she is doing. Patient verbalized understanding.

## 2022-11-07 ENCOUNTER — OFFICE VISIT (OUTPATIENT)
Dept: CARDIOLOGY CLINIC | Age: 52
End: 2022-11-07
Payer: MEDICARE

## 2022-11-07 VITALS
HEIGHT: 65 IN | BODY MASS INDEX: 32.99 KG/M2 | WEIGHT: 198 LBS | HEART RATE: 65 BPM | SYSTOLIC BLOOD PRESSURE: 132 MMHG | DIASTOLIC BLOOD PRESSURE: 74 MMHG | OXYGEN SATURATION: 98 %

## 2022-11-07 DIAGNOSIS — I27.20 PULMONARY HTN (HCC): ICD-10-CM

## 2022-11-07 DIAGNOSIS — R00.2 PALPITATIONS: Primary | ICD-10-CM

## 2022-11-07 DIAGNOSIS — R07.9 CHEST PAIN, UNSPECIFIED TYPE: ICD-10-CM

## 2022-11-07 DIAGNOSIS — R06.09 DOE (DYSPNEA ON EXERTION): ICD-10-CM

## 2022-11-07 PROCEDURE — 3017F COLORECTAL CA SCREEN DOC REV: CPT | Performed by: INTERNAL MEDICINE

## 2022-11-07 PROCEDURE — G9717 DOC PT DX DEP/BP F/U NT REQ: HCPCS | Performed by: INTERNAL MEDICINE

## 2022-11-07 PROCEDURE — G8428 CUR MEDS NOT DOCUMENT: HCPCS | Performed by: INTERNAL MEDICINE

## 2022-11-07 PROCEDURE — G8417 CALC BMI ABV UP PARAM F/U: HCPCS | Performed by: INTERNAL MEDICINE

## 2022-11-07 PROCEDURE — 99213 OFFICE O/P EST LOW 20 MIN: CPT | Performed by: INTERNAL MEDICINE

## 2022-11-07 RX ORDER — ONDANSETRON 4 MG/1
TABLET, ORALLY DISINTEGRATING ORAL
COMMUNITY
Start: 2022-10-28

## 2022-11-07 RX ORDER — MECLIZINE HYDROCHLORIDE 25 MG/1
TABLET ORAL
COMMUNITY
Start: 2022-10-28

## 2022-11-07 RX ORDER — SERTRALINE HYDROCHLORIDE 100 MG/1
100 TABLET, FILM COATED ORAL DAILY
COMMUNITY
Start: 2022-10-04

## 2022-11-07 NOTE — LETTER
11/7/2022    Patient: Rubén Laws   YOB: 1970   Date of Visit: 11/7/2022     Dariusz Nava   [address]  [id number] [address] [address]  [address] [facility] [address]    Dear Amelia Wolff MD,      Thank you for referring Ms. Rubén Laws to [address] [place] [address] for evaluation. My notes for this consultation are attached. If you have questions, please do not hesitate to call me. I look forward to following your patient along with you.       Sincerely,    Abel Godfrey MD

## 2022-11-07 NOTE — PROGRESS NOTES
[facility]    Ms. Rangel Jaimes is 50 y.o. female with history of anemia, arthritis, fibromyalgia    Patient is here today for follow-up appointment. Symptom that is concerning for angina or heart failure. She denies palpitation, presyncope or syncope. Approximately 3 weeks ago, she was diagnosed with vertigo and she has been started on Zofran and meclizine with significant improvement in the symptoms. She has appoint with the ENT physician as well. She denies any PND or lower extremity swelling. Past Medical History:   Diagnosis Date    Anemia     Arthritis     Autoimmune disease ([address]     Fibroids     uterine fibroids    Fibromyalgia     Fibromyalgia     Knee pain, right     Menorrhagia     Psychiatric disorder     anxiety    Thyroid disease     ENLARGED THYROID    Tobacco abuse     quit in 2020       Review of Systems:  Cardiac symptoms as noted above in HPI. All others negative. Current Outpatient Medications   Medication Sig    meclizine (ANTIVERT) 25 mg tablet TAKE 1 TABLET BY MOUTH 3 TIMES A DAY AS NEEDED    sertraline (ZOLOFT) 100 mg tablet Take 100 mg by mouth daily. ondansetron (ZOFRAN ODT) 4 mg disintegrating tablet ALLOW 1 TABLET TO DISSOLVE ON TONGUE EVERY 8 HOURS AS NEEDED FOR NAUSEA AND VOMITING    traZODone (DESYREL) 100 mg tablet TAKE 1 TABLET BY MOUTH NIGHTLY    cholecalciferol (VITAMIN D3) (5000 Units/125 mcg) tab tablet Take  by mouth daily. meloxicam (MOBIC) 7.5 mg tablet Take 1 Tablet by mouth two (2) times a day. QUEtiapine (SEROquel) 25 mg tablet Take 25 mg by mouth daily. estradioL (ESTRACE) 0.5 mg tablet Take 0.5 mg by mouth daily. ergocalciferol (ERGOCALCIFEROL) 1,250 mcg (50,000 unit) capsule Take 50,000 Units by mouth every seven (7) days. No current facility-administered medications for this visit.        Past Surgical History:   Procedure Laterality Date    HX HYSTERECTOMY      HX OTHER SURGICAL      surgery for punctured intestine    HX OVARIAN CYST REMOVAL Bilateral     HX SALPINGO-OOPHORECTOMY Bilateral     HX TUBAL LIGATION         Allergies and Sensitivities:  No Known Allergies    Family History:  Family History   Problem Relation Age of Onset    Hypertension Mother     Diabetes Mother     Breast Cancer Maternal Aunt        Social History:  Social History     Tobacco Use    Smoking status: Former     Packs/day: 0.25     Years: 19.00     Pack years: 4.75     Types: Cigarettes     Quit date:      Years since quittin.8    Smokeless tobacco: Never   Substance Use Topics    Alcohol use: No    Drug use: No     She  reports that she quit smoking about 8 years ago. Her smoking use included cigarettes. She has a 4.75 pack-year smoking history. She has never used smokeless tobacco.  She  reports no history of alcohol use. Physical Exam:  BP Readings from Last 3 Encounters:   22 132/74   22 138/88   22 114/72         Pulse Readings from Last 3 Encounters:   22 65   22 70   22 74          Wt Readings from Last 3 Encounters:   22 89.8 kg (198 lb)   22 90.7 kg (200 lb)   22 86.2 kg (190 lb)     Constitutional: Oriented to person, place, and time. HENT: Head: Normocephalic and atraumatic. Neck: No JVD present. Carotid bruit is not appreciated. Cardiovascular: Regular rhythm. No murmur, gallop or rubs appreciated  Lung: Breath sounds normal. No respiratory distress. No ronchi or rales appreciated  Abdominal: No tenderness. No rebound and no guarding. Musculoskeletal: There is no lower extremity edema.  No cynosis    LABS:   @  Lab Results   Component Value Date/Time    WBC 5.5 2022 12:25 PM    HGB 11.1 (L) 2022 12:25 PM    HCT 33.8 (L) 2022 12:25 PM    PLATELET 324  12:25 PM    MCV 86.0 2022 12:25 PM     Lab Results   Component Value Date/Time    Sodium 142 2022 12:00 AM    Potassium 3.9 06/14/2022 12:00 AM    Chloride 102 06/14/2022 12:00 AM    CO2 25 06/14/2022 12:00 AM    Glucose 103 (H) 06/14/2022 12:00 AM    BUN 9 06/14/2022 12:00 AM    Creatinine 0.89 06/14/2022 12:00 AM     Lipids Latest Ref Rng & Units 6/14/2022 4/10/2021 12/7/2018   Chol, Total 100 - 199 mg/dL 217(H) 170 176   HDL >39 mg/dL 48 42 59   LDL 0 - 99 mg/dL 128(H) 89 97   Trig 0 - 149 mg/dL 229(H) 230(H) 99   Some recent data might be hidden     Lab Results   Component Value Date/Time    ALT (SGPT) 13 06/14/2022 12:00 AM     Lab Results   Component Value Date/Time    Hemoglobin A1c 5.3 12/07/2018 12:00 AM     Lab Results   Component Value Date/Time    TSH 0.588 06/14/2022 12:00 AM       EKG  Sinus rhythm at 71 bpm.  No pathologic Q waves. Nonspecific T wave changes. ECHO (04/2021)  Left Ventricle Normal cavity size, wall thickness and systolic function (ejection fraction normal). Wall motion: normal. The estimated EF is 60 - 65%. Visually measured ejection fraction. There is inconclusive left ventricular diastolic function. Wall Scoring The left ventricular wall motion is normal.            Left Atrium Normal cavity size. Left Atrium volume index is 29 mL/m2. Right Ventricle Normal cavity size and global systolic function. Right Atrium Normal cavity size. Aortic Valve Normal valve structure, trileaflet valve structure, no stenosis and no regurgitation. Mitral Valve Normal valve structure, no stenosis and no regurgitation. Leaflet calcification. There is mild anterior leaflet calcification diffusely. Tricuspid Valve Normal valve structure and no stenosis. Trace regurgitation. Pulmonic Valve Pulmonic valve normal doppler findings. Normal valve structure, no stenosis and no regurgitation. Aorta Normal aortic root. Pulmonary Artery Pulmonary arterial systolic pressure (PASP) is 31 mmHg. Pulmonary hypertension not suggested by Doppler findings. IVC/Hepatic Veins Normal structure.  Normal central venous pressure (3 mmHg); IVC diameter is less than 21 mm and collapses more than 50% with respiration. Pericardium Insignificant pericardial effusion or fat. STRESS TEST (03/2021)  SPECT: Left ventricular perfusion is normal. Myocardial perfusion imaging supports a low risk stress test.  SPECT: Left ventricular function post-stress was normal. Calculated ejection fraction is 73%. There is no evidence of transient ischemic dilation (TID). The TID ratio is 1.05. IMPRESSION & PLAN:  Ms. Chad Chandler is a 50 y.o. female with fibromyalgia, arthritis    Patient is currently being treated for vertigo with meclizine and Zofran. This is being managed by PCP  Currently patient denies any symptoms to suggest unstable angina or decompensated heart failure    Last lipid profile was in 06/2022 which showed elevation of LDL compared to previous cholesterol as well as elevated total cholesterol. Her 10-year risk of ASCVD based on lipid profile in 06/2022 was 2.8%. Importance of diet and exercise discussed with the patient. This plan was discussed with patient who is in agreement. Thank you for allowing me to participate in patient care. Please feel free to call me if you have any question or concern. Kristi Lesch, MD  Please note: This document has been produced using voice recognition software. Unrecognized errors in transcription may be present.

## 2022-11-12 NOTE — TELEPHONE ENCOUNTER
Per Dr. Rafa Singh conversation with patient: Called pt. She is doing better. Was diagnosed with vertigo and currently taking twice a day medication. Will see if she is covered with treatment for vertigo? ?   Will discuss it on coming up follow up appt on 14th of this month. I have advised pt to confirm the appt and advised to keep an appt for further discussion . She agrees with the plan.      Rafa Singh      Encounter closed on 11-

## 2022-12-27 DIAGNOSIS — G47.9 TROUBLE IN SLEEPING: ICD-10-CM

## 2022-12-27 RX ORDER — TRAZODONE HYDROCHLORIDE 100 MG/1
100 TABLET ORAL
Qty: 90 TABLET | Refills: 1 | Status: SHIPPED | OUTPATIENT
Start: 2022-12-27

## 2023-01-01 NOTE — ED PROVIDER NOTES
EMERGENCY DEPARTMENT HISTORY AND PHYSICAL EXAM    12:09 PM      Date: 2022  Patient Name: Melissa Savage    History of Presenting Illness     Chief Complaint   Patient presents with    Bleeding/Bruising    Numbness    Groin Pain         History Provided By: Patient    Additional History (Context): Melissa Savage is a 50 y.o. female with Past medical history of anemia, fibroids, fibromyalgia, anxiety who presents with chief complaint of bruising in both of her anterior thighs since she had a hysterectomy recently. She confirms that there has been no bleeding of her thighs. She denies any leg swelling or calf pain. She does report of a boil in her left groin region. She denies any fever, no pus drainage, redness, dizziness, abdominal pain, flank pain, shortness of breath, chest pain, no other complaints. PCP: Franko Roche MD        Past History     Past Medical History:  Past Medical History:   Diagnosis Date    Anemia     Arthritis     Autoimmune disease ([address]     Fibroids     uterine fibroids    Fibromyalgia     Fibromyalgia     Knee pain, right     Menorrhagia     Psychiatric disorder     anxiety    Thyroid disease     ENLARGED THYROID    Tobacco abuse     quit in        Past Surgical History:  Past Surgical History:   Procedure Laterality Date    HX HYSTERECTOMY      HX OTHER SURGICAL      surgery for punctured intestine    HX OVARIAN CYST REMOVAL Bilateral     HX SALPINGO-OOPHORECTOMY Bilateral     HX TUBAL LIGATION         Family History:  Family History   Problem Relation Age of Onset    Hypertension Mother     Diabetes Mother     Breast Cancer Maternal Aunt        Social History:  Social History     Tobacco Use    Smoking status: Former Smoker     Packs/day: 0.25     Years: 19.00     Pack years: 4.75     Types: Cigarettes     Quit date:      Years since quittin.3    Smokeless tobacco: Never Used   Substance Use Topics    Alcohol use: No    Drug use:  No Allergies:  No Known Allergies      Review of Systems       Review of Systems   Constitutional: Negative for chills and fever. HENT: Negative for congestion, rhinorrhea, sore throat and trouble swallowing. Eyes: Negative for visual disturbance. Respiratory: Negative for cough, shortness of breath and wheezing. Cardiovascular: Negative for chest pain, palpitations and leg swelling. Gastrointestinal: Negative for abdominal pain, nausea and vomiting. Endocrine: Negative for polyuria. Genitourinary: Negative for dysuria and flank pain. Musculoskeletal: Negative for arthralgias, back pain and neck stiffness. Skin: Negative for pallor and rash. Boil on left groin   Neurological: Negative for dizziness, weakness, numbness and headaches. Hematological: Does not bruise/bleed easily. Bruising on both anterior thighs where was strapped down during hysterectomy procedure   Psychiatric/Behavioral: Negative for confusion and dysphoric mood. All other systems reviewed and are negative. Physical Exam     Visit Vitals  /68   Pulse 74   Temp 98.9 °F (37.2 °C)   Resp 16   Ht 5' 4\" (1.626 m)   Wt 87.1 kg (192 lb)   LMP 01/15/2016   SpO2 100%   BMI 32.96 kg/m²         Physical Exam  Vitals and nursing note reviewed. Constitutional:       General: She is not in acute distress. Appearance: She is well-developed. She is not diaphoretic. HENT:      Head: Normocephalic and atraumatic. Eyes:      General: No scleral icterus. Conjunctiva/sclera: Conjunctivae normal.      Pupils: Pupils are equal, round, and reactive to light. Cardiovascular:      Rate and Rhythm: Normal rate. Pulses: Normal pulses. Heart sounds: Normal heart sounds. Comments: Capillary refill < 3 seconds  Pulmonary:      Effort: Pulmonary effort is normal. No respiratory distress. Breath sounds: Normal breath sounds. No stridor. No wheezing, rhonchi or rales.    Abdominal:      General: Bowel sounds are normal. There is no distension. Palpations: Abdomen is soft. Tenderness: There is no abdominal tenderness. Musculoskeletal:         General: Normal range of motion. Cervical back: Normal range of motion and neck supple. Right lower leg: No edema. Left lower leg: No edema. Comments: Very faint small bruise bilateral anterior thighs. No thigh swelling or tenderness. Lymphadenopathy:      Cervical: No cervical adenopathy. Skin:     General: Skin is warm and dry. Comments: Small 1 and half centimeter fluctuant abscess in the left groin region. No pus drainage, no surrounding erythema. Minimally tender to palpate. Neurological:      General: No focal deficit present. Mental Status: She is alert and oriented to person, place, and time. Cranial Nerves: No cranial nerve deficit. Sensory: No sensory deficit. Motor: No weakness. Coordination: Coordination normal.   Psychiatric:         Mood and Affect: Mood normal.           Diagnostic Study Results     Labs -  Recent Results (from the past 12 hour(s))   CBC WITH AUTOMATED DIFF    Collection Time: 05/23/22 12:25 PM   Result Value Ref Range    WBC 5.5 4.6 - 13.2 K/uL    RBC 3.93 (L) 4.20 - 5.30 M/uL    HGB 11.1 (L) 12.0 - 16.0 g/dL    HCT 33.8 (L) 35.0 - 45.0 %    MCV 86.0 78.0 - 100.0 FL    MCH 28.2 24.0 - 34.0 PG    MCHC 32.8 31.0 - 37.0 g/dL    RDW 11.9 11.6 - 14.5 %    PLATELET 192 921 - 762 K/uL    MPV 11.0 9.2 - 11.8 FL    NRBC 0.0 0  WBC    ABSOLUTE NRBC 0.00 0.00 - 0.01 K/uL    NEUTROPHILS 39 (L) 40 - 73 %    LYMPHOCYTES 48 21 - 52 %    MONOCYTES 10 3 - 10 %    EOSINOPHILS 2 0 - 5 %    BASOPHILS 1 0 - 2 %    IMMATURE GRANULOCYTES 1 (H) 0.0 - 0.5 %    ABS. NEUTROPHILS 2.2 1.8 - 8.0 K/UL    ABS. LYMPHOCYTES 2.6 0.9 - 3.6 K/UL    ABS. MONOCYTES 0.6 0.05 - 1.2 K/UL    ABS. EOSINOPHILS 0.1 0.0 - 0.4 K/UL    ABS. BASOPHILS 0.0 0.0 - 0.1 K/UL    ABS. IMM.  GRANS. 0.0 0.00 - 0.04 K/UL    DF AUTOMATED     METABOLIC PANEL, COMPREHENSIVE    Collection Time: 05/23/22 12:25 PM   Result Value Ref Range    Sodium 141 136 - 145 mmol/L    Potassium 3.7 3.5 - 5.5 mmol/L    Chloride 108 100 - 111 mmol/L    CO2 29 21 - 32 mmol/L    Anion gap 4 3.0 - 18 mmol/L    Glucose 105 (H) 74 - 99 mg/dL    BUN 11 7.0 - 18 MG/DL    Creatinine 0.80 0.6 - 1.3 MG/DL    BUN/Creatinine ratio 14 12 - 20      GFR est AA >60 >60 ml/min/1.73m2    GFR est non-AA >60 >60 ml/min/1.73m2    Calcium 9.0 8.5 - 10.1 MG/DL    Bilirubin, total 0.3 0.2 - 1.0 MG/DL    ALT (SGPT) 25 13 - 56 U/L    AST (SGOT) 23 10 - 38 U/L    Alk. phosphatase 72 45 - 117 U/L    Protein, total 7.7 6.4 - 8.2 g/dL    Albumin 3.6 3.4 - 5.0 g/dL    Globulin 4.1 (H) 2.0 - 4.0 g/dL    A-G Ratio 0.9 0.8 - 1.7     PROTHROMBIN TIME + INR    Collection Time: 05/23/22 12:25 PM   Result Value Ref Range    Prothrombin time 12.2 11.5 - 15.2 sec    INR 0.9 0.8 - 1.2     PTT    Collection Time: 05/23/22 12:25 PM   Result Value Ref Range    aPTT 37.4 (H) 23.0 - 36.4 SEC       Radiologic Studies -   No orders to display         Medical Decision Making   I am the first provider for this patient. I reviewed the vital signs, available nursing notes, past medical history, past surgical history, family history and social history. Vital Signs-Reviewed the patient's vital signs. Pulse Oximetry Analysis -  98% on room air (Interpretation) normal        Records Reviewed: Nursing Notes and Old Medical Records (Time of Review: 12:09 PM)    Provider Notes (Medical Decision Making): DDx: Contusion, abscess    Check labs, gave patient option to do incision and drainage versus antibiotic and warm compresses. She does not want to do incision and drainage but opts for antibiotic and warm compresses at home. MDM    Medications - No data to display      ED Course: Progress Notes, Reevaluation, and Consults:  Labs are reassuring    I have reassessed the patient.  I have discussed the workup, results and plan with the patient and patient is in agreement. Patient is feeling better. Patient will be prescribed Bactrim. Patient was discharge in stable condition. Patient was given outpatient follow up. Patient is to return to emergency department if any new or worsening condition. Diagnosis     Clinical Impression:   1. Abscess of left groin    2. Superficial bruising        Disposition: Discharged    Follow-up Information     Follow up With Specialties Details Why Contact Info    Boaz Chavez MD Family Medicine Schedule an appointment as soon as possible for a visit in 3 days  [address] [facility]  [address] [address] [address]  [phone] [address] [facility] EMERGENCY DEPT Emergency Medicine  As needed, If symptoms worsen [address] [facility]  [phone]           Patient's Medications   Start Taking    TRIMETHOPRIM-SULFAMETHOXAZOLE (BACTRIM DS) 160-800 MG PER TABLET    Take 1 Tablet by mouth two (2) times a day for 7 days. Indications: For abscess   Continue Taking    ERGOCALCIFEROL (ERGOCALCIFEROL) 1,250 MCG (50,000 UNIT) CAPSULE    Take 50,000 Units by mouth every seven (7) days. ESTRADIOL (ESTRACE) 0.5 MG TABLET    Take 0.5 mg by mouth daily. MELOXICAM (MOBIC) 15 MG TABLET    Take 15 mg by mouth as needed. QUETIAPINE (SEROQUEL) 25 MG TABLET    Take 25 mg by mouth daily. TRAZODONE (DESYREL) 100 MG TABLET    Take 1 Tab by mouth daily. These Medications have changed    No medications on file   Stop Taking    ASPIRIN DELAYED-RELEASE 81 MG TABLET    Take 1 Tab by mouth daily. NAPROXEN (NAPROSYN) 500 MG TABLET    Take 1 Tablet by mouth two (2) times daily (with meals). NITROGLYCERIN (NITROSTAT) 0.4 MG SL TABLET    1 TAB BY SUBLINGUAL ROUTE EVERY FIVE MINUTES AS NEEDED FOR CHEST PAIN. UP TO 3 DOSES.  CALL 911    SERTRALINE (ZOLOFT) 100 MG TABLET    SERTRALINE 100 MG TABLET         Anjana Goosdon DO    Dragon medical dictation software was used for portions of this report. Unintended transcription errors may occur. My signature above authenticates this document and my orders, the final    diagnosis (es), discharge prescription (s), and instructions in the Epic    record. [id number]

## 2023-01-06 ENCOUNTER — TRANSCRIBE ORDER (OUTPATIENT)
Dept: SCHEDULING | Age: 53
End: 2023-01-06

## 2023-01-06 DIAGNOSIS — Z12.31 VISIT FOR SCREENING MAMMOGRAM: Primary | ICD-10-CM

## 2023-01-30 ENCOUNTER — OFFICE VISIT (OUTPATIENT)
Dept: FAMILY MEDICINE CLINIC | Age: 53
End: 2023-01-30
Payer: MEDICARE

## 2023-01-30 VITALS
HEIGHT: 65 IN | HEART RATE: 62 BPM | DIASTOLIC BLOOD PRESSURE: 70 MMHG | SYSTOLIC BLOOD PRESSURE: 132 MMHG | RESPIRATION RATE: 18 BRPM | TEMPERATURE: 97.9 F | OXYGEN SATURATION: 98 % | BODY MASS INDEX: 32.51 KG/M2 | WEIGHT: 195.13 LBS

## 2023-01-30 DIAGNOSIS — M79.7 FIBROMYALGIA: ICD-10-CM

## 2023-01-30 DIAGNOSIS — F33.2 SEVERE EPISODE OF RECURRENT MAJOR DEPRESSIVE DISORDER, WITHOUT PSYCHOTIC FEATURES (HCC): Primary | ICD-10-CM

## 2023-01-30 DIAGNOSIS — R31.29 MICROSCOPIC HEMATURIA: ICD-10-CM

## 2023-01-30 DIAGNOSIS — R53.82 CHRONIC FATIGUE: ICD-10-CM

## 2023-01-30 DIAGNOSIS — R42 DIZZINESS: ICD-10-CM

## 2023-01-30 DIAGNOSIS — R09.81 NASAL CONGESTION: ICD-10-CM

## 2023-01-30 DIAGNOSIS — Z12.31 ENCOUNTER FOR SCREENING MAMMOGRAM FOR MALIGNANT NEOPLASM OF BREAST: ICD-10-CM

## 2023-01-30 DIAGNOSIS — E04.2 MULTINODULAR GOITER: ICD-10-CM

## 2023-01-30 DIAGNOSIS — R20.2 PARESTHESIA OF BOTH HANDS: ICD-10-CM

## 2023-01-30 PROCEDURE — G8427 DOCREV CUR MEDS BY ELIG CLIN: HCPCS | Performed by: FAMILY MEDICINE

## 2023-01-30 PROCEDURE — G9717 DOC PT DX DEP/BP F/U NT REQ: HCPCS | Performed by: FAMILY MEDICINE

## 2023-01-30 PROCEDURE — 3017F COLORECTAL CA SCREEN DOC REV: CPT | Performed by: FAMILY MEDICINE

## 2023-01-30 PROCEDURE — G8417 CALC BMI ABV UP PARAM F/U: HCPCS | Performed by: FAMILY MEDICINE

## 2023-01-30 PROCEDURE — 99213 OFFICE O/P EST LOW 20 MIN: CPT | Performed by: FAMILY MEDICINE

## 2023-01-30 NOTE — PROGRESS NOTES
HISTORY OF PRESENT ILLNESS  Pilar Baeza is a 50 y.o. female. HPI: Here for follow-up. Complaining of bilateral hand tingling sensation been worse. History of carpal tunnel syndrome. Used to see Dr. Lois Juares but according to patient he is not in the office anymore need a new referral.  Done a referral to Ortho. Given home exercise. Sitting without any acute distress. History of fibromyalgia, depression, chronic fatigue. Currently fairly stable. Following specialist and the recommendation. Has been feeling nasal congestion on and off. Not taken any medication. Advised Zyrtec and Vicks VapoRub  Denies any headache, dizziness, no chest pain or trouble breathing, no arm or leg weakness. No nausea or vomiting, no weight or appetite changes, no mood changes . No urine or bowel complains, no palpitation, no diaphoresis. No abdominal pain. No cold or cough. No leg swelling. No fever. No sleep trouble. Visit Vitals  /70 (BP 1 Location: Right upper arm, BP Patient Position: Sitting, BP Cuff Size: Adult)   Pulse 62   Temp 97.9 °F (36.6 °C) (Temporal)   Resp 18   Ht 5' 5\" (1.651 m)   Wt 195 lb 2 oz (88.5 kg)   SpO2 98%   BMI 32.47 kg/m²     Review medication list, vitals, problem list,allergies.    Lab Results   Component Value Date/Time    WBC 5.5 05/23/2022 12:25 PM    HGB 11.1 (L) 05/23/2022 12:25 PM    HCT 33.8 (L) 05/23/2022 12:25 PM    PLATELET 400 00/58/9466 12:25 PM    MCV 86.0 05/23/2022 12:25 PM     Lab Results   Component Value Date/Time    Cholesterol, total 217 (H) 06/14/2022 12:00 AM    HDL Cholesterol 48 06/14/2022 12:00 AM    LDL, calculated 128 (H) 06/14/2022 12:00 AM    LDL, calculated 97 12/07/2018 12:00 AM    Triglyceride 229 (H) 06/14/2022 12:00 AM     Lab Results   Component Value Date/Time    TSH 0.588 06/14/2022 12:00 AM      Lab Results   Component Value Date/Time    Sodium 142 06/14/2022 12:00 AM    Potassium 3.9 06/14/2022 12:00 AM    Chloride 102 06/14/2022 12:00 AM    CO2 25 06/14/2022 12:00 AM    Anion gap 4 05/23/2022 12:25 PM    Glucose 103 (H) 06/14/2022 12:00 AM    BUN 9 06/14/2022 12:00 AM    Creatinine 0.89 06/14/2022 12:00 AM    BUN/Creatinine ratio 10 06/14/2022 12:00 AM    GFR est AA >60 05/23/2022 12:25 PM    GFR est non-AA >60 05/23/2022 12:25 PM    Calcium 9.4 06/14/2022 12:00 AM      Lab Results   Component Value Date/Time    Sodium 142 06/14/2022 12:00 AM    Potassium 3.9 06/14/2022 12:00 AM    Chloride 102 06/14/2022 12:00 AM    CO2 25 06/14/2022 12:00 AM    Anion gap 4 05/23/2022 12:25 PM    Glucose 103 (H) 06/14/2022 12:00 AM    BUN 9 06/14/2022 12:00 AM    Creatinine 0.89 06/14/2022 12:00 AM    BUN/Creatinine ratio 10 06/14/2022 12:00 AM    GFR est AA >60 05/23/2022 12:25 PM    GFR est non-AA >60 05/23/2022 12:25 PM    Calcium 9.4 06/14/2022 12:00 AM    Bilirubin, total <0.2 06/14/2022 12:00 AM    ALT (SGPT) 13 06/14/2022 12:00 AM    Alk. phosphatase 77 06/14/2022 12:00 AM    Protein, total 7.1 06/14/2022 12:00 AM    Albumin 4.4 06/14/2022 12:00 AM    Globulin 4.1 (H) 05/23/2022 12:25 PM    A-G Ratio 1.6 06/14/2022 12:00 AM      Lab Results   Component Value Date/Time    Hemoglobin A1c 5.3 12/07/2018 12:00 AM         ROS: See HPI  Physical Exam  Constitutional:       General: She is not in acute distress. Cardiovascular:      Rate and Rhythm: Normal rate and regular rhythm. Heart sounds: Normal heart sounds. Abdominal:      General: Bowel sounds are normal.      Palpations: Abdomen is soft. Tenderness: There is no abdominal tenderness. Musculoskeletal:         General: No swelling. Cervical back: Neck supple. Comments: Generalized discomfort over bilateral breast area. Tinel test positive bilaterally   Neurological:      Mental Status: She is oriented to person, place, and time. Psychiatric:         Behavior: Behavior normal.       ASSESSMENT and PLAN    ICD-10-CM ICD-9-CM    1.  Severe episode of recurrent major depressive disorder, without psychotic features ([address]): Fairly stable. Following psychiatrist and taking medication with compliance. Currently fairly stable. F33.2 296.33     following counselor and psychiatrist at [facility] behavioural health Dr. Dean Rivera       2. Fibromyalgia: Fairly stable. M79.7 729.1       3. Chronic fatigue  R53.82 780.79       4. Multinodular goiter: Has been following Endo. Asymptomatic. No trouble swallowing or change in voice. Done a new referral as she needed to reestablish care with a new provider due to some insurance change E04.2 241.1 REFERRAL TO ENDOCRINOLOGY    following endo/ went in feb       5. Microscopic hematuria: Work-up with urology negative. Currently denies any UTI symptoms. Will observe R31.29 599.72     work up negative       6. Dizziness: Fairly stable. On and off vertigo R42 780.4       7. Encounter for screening mammogram for malignant neoplasm of breast  Z12.31 V76.12 [facility] MAMMO BI SCREENING INCL CAD      8. Paresthesia of both hands: History of carpal tunnel syndrome. Sending to Ortho as she needed a new referral.  Prior provider not working in the same office. R20.2 782.0 REFERRAL TO ORTHOPEDICS      CANCELED: REFERRAL TO HAND SURGERY      9. Nasal congestion: Currently symptomatic treatment with Zyrtec at bedtime and the Vicks VapoRub R09.81 478.19       Patient understood agreed with the plan  Follow-up and Dispositions    Return in about 6 months (around 7/30/2023). Please note that this dictation was completed with Dindong, the computer voice recognition software. Quite often unanticipated grammatical, syntax, homophones, and other interpretive errors are inadvertently transcribed by the computer software. Please disregard these errors. Please excuse any errors that have escaped final proofreading.

## 2023-01-30 NOTE — PROGRESS NOTES
Visit Vitals  /70 (BP 1 Location: Right upper arm, BP Patient Position: Sitting, BP Cuff Size: Adult)   Pulse 62   Temp 97.9 °F (36.6 °C) (Temporal)   Resp 18   Ht 5' 5\" (1.651 m)   Wt 195 lb 2 oz (88.5 kg)   LMP 01/15/2016   SpO2 98%   BMI 32.47 kg/m²      1. \"Have you been to the ER, urgent care clinic since your last visit? Hospitalized since your last visit? \" Yes, Kavitha Resendiz 12/2022    2. \"Have you seen or consulted any other health care providers outside of the [address] [place] [address] since your last visit? \" No     3. For patients aged 39-70: Has the patient had a colonoscopy / FIT/ Cologuard? Yes - no Care Gap present      If the patient is female:    4. For patients aged 41-77: Has the patient had a mammogram within the past 2 years? Yes - no Care Gap present      5. For patients aged 21-65: Has the patient had a pap smear?  Yes - no Care Gap present

## 2023-01-31 ENCOUNTER — HOSPITAL ENCOUNTER (OUTPATIENT)
Dept: MAMMOGRAPHY | Age: 53
Discharge: HOME | End: 2023-01-31
Attending: FAMILY MEDICINE
Payer: MEDICARE

## 2023-01-31 DIAGNOSIS — Z12.31 VISIT FOR SCREENING MAMMOGRAM: ICD-10-CM

## 2023-01-31 PROCEDURE — 77063 BREAST TOMOSYNTHESIS BI: CPT

## 2023-02-01 DIAGNOSIS — Z12.31 VISIT FOR SCREENING MAMMOGRAM: Primary | ICD-10-CM

## 2023-02-04 DIAGNOSIS — Z12.31 VISIT FOR SCREENING MAMMOGRAM: Primary | ICD-10-CM

## 2023-03-21 ENCOUNTER — TELEPHONE (OUTPATIENT)
Age: 53
End: 2023-03-21

## 2023-07-24 RX ORDER — TRAZODONE HYDROCHLORIDE 100 MG/1
TABLET ORAL
Qty: 90 TABLET | Refills: 1 | Status: SHIPPED | OUTPATIENT
Start: 2023-07-24

## 2023-07-24 NOTE — TELEPHONE ENCOUNTER
Requested Prescriptions     Pending Prescriptions Disp Refills    traZODone (DESYREL) 100 MG tablet [Pharmacy Med Name: TRAZODONE 100 MG TABLET] 90 tablet 1     Sig: TAKE 1 TABLET BY MOUTH NIGHTLY     Last OV: 1/30/2023  Last labs: 6/14/2022  Next OV: 7/31/2023

## 2023-07-30 SDOH — ECONOMIC STABILITY: FOOD INSECURITY: WITHIN THE PAST 12 MONTHS, THE FOOD YOU BOUGHT JUST DIDN'T LAST AND YOU DIDN'T HAVE MONEY TO GET MORE.: OFTEN TRUE

## 2023-07-30 SDOH — ECONOMIC STABILITY: HOUSING INSECURITY
IN THE LAST 12 MONTHS, WAS THERE A TIME WHEN YOU DID NOT HAVE A STEADY PLACE TO SLEEP OR SLEPT IN A SHELTER (INCLUDING NOW)?: NO

## 2023-07-30 SDOH — HEALTH STABILITY: PHYSICAL HEALTH: ON AVERAGE, HOW MANY MINUTES DO YOU ENGAGE IN EXERCISE AT THIS LEVEL?: 10 MIN

## 2023-07-30 SDOH — ECONOMIC STABILITY: TRANSPORTATION INSECURITY
IN THE PAST 12 MONTHS, HAS LACK OF TRANSPORTATION KEPT YOU FROM MEETINGS, WORK, OR FROM GETTING THINGS NEEDED FOR DAILY LIVING?: NO

## 2023-07-30 SDOH — ECONOMIC STABILITY: FOOD INSECURITY: WITHIN THE PAST 12 MONTHS, YOU WORRIED THAT YOUR FOOD WOULD RUN OUT BEFORE YOU GOT MONEY TO BUY MORE.: OFTEN TRUE

## 2023-07-30 SDOH — HEALTH STABILITY: PHYSICAL HEALTH: ON AVERAGE, HOW MANY DAYS PER WEEK DO YOU ENGAGE IN MODERATE TO STRENUOUS EXERCISE (LIKE A BRISK WALK)?: 0 DAYS

## 2023-07-30 SDOH — ECONOMIC STABILITY: INCOME INSECURITY: HOW HARD IS IT FOR YOU TO PAY FOR THE VERY BASICS LIKE FOOD, HOUSING, MEDICAL CARE, AND HEATING?: VERY HARD

## 2023-07-30 ASSESSMENT — LIFESTYLE VARIABLES
HOW OFTEN DO YOU HAVE A DRINK CONTAINING ALCOHOL: NEVER
HOW MANY STANDARD DRINKS CONTAINING ALCOHOL DO YOU HAVE ON A TYPICAL DAY: 0
HOW OFTEN DO YOU HAVE SIX OR MORE DRINKS ON ONE OCCASION: 1
HOW MANY STANDARD DRINKS CONTAINING ALCOHOL DO YOU HAVE ON A TYPICAL DAY: PATIENT DOES NOT DRINK
HOW OFTEN DO YOU HAVE A DRINK CONTAINING ALCOHOL: 1

## 2023-07-30 ASSESSMENT — PATIENT HEALTH QUESTIONNAIRE - PHQ9
SUM OF ALL RESPONSES TO PHQ QUESTIONS 1-9: 1
SUM OF ALL RESPONSES TO PHQ QUESTIONS 1-9: 1
2. FEELING DOWN, DEPRESSED OR HOPELESS: 0
SUM OF ALL RESPONSES TO PHQ9 QUESTIONS 1 & 2: 1
SUM OF ALL RESPONSES TO PHQ QUESTIONS 1-9: 1
SUM OF ALL RESPONSES TO PHQ QUESTIONS 1-9: 1
1. LITTLE INTEREST OR PLEASURE IN DOING THINGS: 1

## 2023-07-31 ENCOUNTER — OFFICE VISIT (OUTPATIENT)
Age: 53
End: 2023-07-31
Payer: MEDICARE

## 2023-07-31 VITALS
WEIGHT: 199.8 LBS | DIASTOLIC BLOOD PRESSURE: 70 MMHG | RESPIRATION RATE: 16 BRPM | HEIGHT: 65 IN | TEMPERATURE: 97 F | SYSTOLIC BLOOD PRESSURE: 108 MMHG | BODY MASS INDEX: 33.29 KG/M2 | HEART RATE: 75 BPM | OXYGEN SATURATION: 97 %

## 2023-07-31 DIAGNOSIS — M25.561 CHRONIC PAIN OF RIGHT KNEE: ICD-10-CM

## 2023-07-31 DIAGNOSIS — Z11.4 SCREENING FOR HIV (HUMAN IMMUNODEFICIENCY VIRUS): ICD-10-CM

## 2023-07-31 DIAGNOSIS — M79.7 FIBROMYALGIA: ICD-10-CM

## 2023-07-31 DIAGNOSIS — G89.29 CHRONIC PAIN OF RIGHT KNEE: ICD-10-CM

## 2023-07-31 DIAGNOSIS — E78.00 PURE HYPERCHOLESTEROLEMIA: ICD-10-CM

## 2023-07-31 DIAGNOSIS — Z00.00 MEDICARE ANNUAL WELLNESS VISIT, SUBSEQUENT: Primary | ICD-10-CM

## 2023-07-31 DIAGNOSIS — F33.2 SEVERE EPISODE OF RECURRENT MAJOR DEPRESSIVE DISORDER, WITHOUT PSYCHOTIC FEATURES (HCC): ICD-10-CM

## 2023-07-31 DIAGNOSIS — E04.2 MULTINODULAR GOITER: ICD-10-CM

## 2023-07-31 DIAGNOSIS — R73.01 IMPAIRED FASTING BLOOD SUGAR: ICD-10-CM

## 2023-07-31 DIAGNOSIS — M25.50 MULTIPLE JOINT PAIN: ICD-10-CM

## 2023-07-31 PROCEDURE — G0439 PPPS, SUBSEQ VISIT: HCPCS | Performed by: FAMILY MEDICINE

## 2023-07-31 PROCEDURE — 99214 OFFICE O/P EST MOD 30 MIN: CPT | Performed by: FAMILY MEDICINE

## 2023-07-31 NOTE — ACP (ADVANCE CARE PLANNING)
Advance Care Planning     General Advance Care Planning (ACP) Conversation    Date of Conversation: 7/31/2023  Conducted with: Patient with [address] Decision Maker:    Primary Decision Maker: Yosef Olmedo - [facility] - [phone]  Click here to complete Healthcare Decision Makers including selection of the Healthcare Decision Maker Relationship (ie \"Primary\"). Today we discussed Healthcare Decision Makers. The patient is considering options.     Content/Action Overview:  Has NO ACP documents/care preferences - requested patient complete ACP documents  Reviewed DNR/DNI and patient elects Full Code (Attempt Resuscitation)  treatment goals, benefit/burden of treatment options, artificial nutrition, ventilation preferences, hospitalization preferences, resuscitation preferences, end of life care preferences (vegetative state/imminent death), and hospice care    Length of Voluntary ACP Conversation in minutes:  <16 minutes (Non-Billable)    Yogi Iyer MD

## 2023-08-08 LAB
ALBUMIN SERPL-MCNC: 4.2 G/DL (ref 3.8–4.9)
ALBUMIN/GLOB SERPL: 1.5 {RATIO} (ref 1.2–2.2)
ALP SERPL-CCNC: 83 IU/L (ref 44–121)
ALT SERPL-CCNC: 16 IU/L (ref 0–32)
AST SERPL-CCNC: 20 IU/L (ref 0–40)
BILIRUB SERPL-MCNC: 0.2 MG/DL (ref 0–1.2)
BUN SERPL-MCNC: 10 MG/DL (ref 6–24)
BUN/CREAT SERPL: 12 (ref 9–23)
CALCIUM SERPL-MCNC: 9.7 MG/DL (ref 8.7–10.2)
CHLORIDE SERPL-SCNC: 103 MMOL/L (ref 96–106)
CHOLEST SERPL-MCNC: 202 MG/DL (ref 100–199)
CO2 SERPL-SCNC: 25 MMOL/L (ref 20–29)
CREAT SERPL-MCNC: 0.85 MG/DL (ref 0.57–1)
EGFRCR SERPLBLD CKD-EPI 2021: 83 ML/MIN/1.73
ERYTHROCYTE [DISTWIDTH] IN BLOOD BY AUTOMATED COUNT: 12.1 % (ref 11.7–15.4)
GLOBULIN SER CALC-MCNC: 2.8 G/DL (ref 1.5–4.5)
GLUCOSE SERPL-MCNC: 102 MG/DL (ref 70–99)
HBA1C MFR BLD: 5.4 % (ref 4.8–5.6)
HCT VFR BLD AUTO: 36.1 % (ref 34–46.6)
HDLC SERPL-MCNC: 51 MG/DL
HGB BLD-MCNC: 11.6 G/DL (ref 11.1–15.9)
HIV 1+2 AB+HIV1 P24 AG SERPL QL IA: NON REACTIVE
LDLC SERPL CALC-MCNC: 113 MG/DL (ref 0–99)
MCH RBC QN AUTO: 27.2 PG (ref 26.6–33)
MCHC RBC AUTO-ENTMCNC: 32.1 G/DL (ref 31.5–35.7)
MCV RBC AUTO: 85 FL (ref 79–97)
PLATELET # BLD AUTO: 335 X10E3/UL (ref 150–450)
POTASSIUM SERPL-SCNC: 4.3 MMOL/L (ref 3.5–5.2)
PROT SERPL-MCNC: 7 G/DL (ref 6–8.5)
RBC # BLD AUTO: 4.27 X10E6/UL (ref 3.77–5.28)
SODIUM SERPL-SCNC: 141 MMOL/L (ref 134–144)
TRIGL SERPL-MCNC: 217 MG/DL (ref 0–149)
TSH SERPL DL<=0.005 MIU/L-ACNC: 0.43 UIU/ML (ref 0.45–4.5)
VLDLC SERPL CALC-MCNC: 38 MG/DL (ref 5–40)
WBC # BLD AUTO: 5.1 X10E3/UL (ref 3.4–10.8)

## 2023-09-26 DIAGNOSIS — B37.9 CANDIDA INFECTION: Primary | ICD-10-CM

## 2023-09-26 RX ORDER — NYSTATIN 100000 [USP'U]/G
POWDER TOPICAL
Qty: 30 G | Refills: 0 | Status: SHIPPED | OUTPATIENT
Start: 2023-09-26

## 2023-11-06 ENCOUNTER — OFFICE VISIT (OUTPATIENT)
Age: 53
End: 2023-11-06
Payer: MEDICARE

## 2023-11-06 VITALS
BODY MASS INDEX: 33.78 KG/M2 | WEIGHT: 203 LBS | SYSTOLIC BLOOD PRESSURE: 110 MMHG | OXYGEN SATURATION: 98 % | HEART RATE: 75 BPM | DIASTOLIC BLOOD PRESSURE: 88 MMHG

## 2023-11-06 DIAGNOSIS — E78.00 PURE HYPERCHOLESTEROLEMIA: Primary | ICD-10-CM

## 2023-11-06 PROCEDURE — G8428 CUR MEDS NOT DOCUMENT: HCPCS | Performed by: INTERNAL MEDICINE

## 2023-11-06 PROCEDURE — G8417 CALC BMI ABV UP PARAM F/U: HCPCS | Performed by: INTERNAL MEDICINE

## 2023-11-06 PROCEDURE — 1036F TOBACCO NON-USER: CPT | Performed by: INTERNAL MEDICINE

## 2023-11-06 PROCEDURE — G8484 FLU IMMUNIZE NO ADMIN: HCPCS | Performed by: INTERNAL MEDICINE

## 2023-11-06 PROCEDURE — 3017F COLORECTAL CA SCREEN DOC REV: CPT | Performed by: INTERNAL MEDICINE

## 2023-11-06 PROCEDURE — 99213 OFFICE O/P EST LOW 20 MIN: CPT | Performed by: INTERNAL MEDICINE

## 2023-11-07 ENCOUNTER — TELEPHONE (OUTPATIENT)
Age: 53
End: 2023-11-07

## 2023-11-07 NOTE — TELEPHONE ENCOUNTER
----- Message from Viola Nieves sent at 11/7/2023 12:04 AM EST -----  Regarding: Test  Contact: [phone]  Ok ty  can you tell me if I’m updated on my Pneumonia shot before I schedule with Shun

## 2023-11-14 NOTE — PROGRESS NOTES
1. \"Have you been to the ER, urgent care clinic since your last visit? Hospitalized since your last visit? \" No    2. \"Have you seen or consulted any other health care providers outside of the [address] [place] [address] since your last visit? \" DR. Hernandez LOV: 11/08/23    3. For patients aged 43-73: Has the patient had a colonoscopy / FIT/ Cologuard? Yes - no Care Gap present      If the patient is female:    4. For patients aged 43-66: Has the patient had a mammogram within the past 2 years? Yes - no Care Gap present      5. For patients aged 21-65: Has the patient had a pap smear?  No - hysterectomy    Chief Complaint   Patient presents with    Weight Loss    Diabetes    Immunizations     Requests HEP B vaccine

## 2023-11-15 ENCOUNTER — APPOINTMENT (OUTPATIENT)
Facility: HOSPITAL | Age: 53
End: 2023-11-15
Payer: MEDICARE

## 2023-11-15 ENCOUNTER — HOSPITAL ENCOUNTER (EMERGENCY)
Facility: HOSPITAL | Age: 53
Discharge: HOME | End: 2023-11-15
Attending: EMERGENCY MEDICINE
Payer: MEDICARE

## 2023-11-15 ENCOUNTER — HOSPITAL ENCOUNTER (OUTPATIENT)
Facility: HOSPITAL | Age: 53
Setting detail: SPECIMEN
Discharge: HOME | End: 2023-11-18
Payer: MEDICARE

## 2023-11-15 ENCOUNTER — OFFICE VISIT (OUTPATIENT)
Age: 53
End: 2023-11-15
Payer: MEDICARE

## 2023-11-15 VITALS
SYSTOLIC BLOOD PRESSURE: 118 MMHG | HEART RATE: 70 BPM | OXYGEN SATURATION: 98 % | RESPIRATION RATE: 16 BRPM | HEIGHT: 65 IN | TEMPERATURE: 97.4 F | BODY MASS INDEX: 33.42 KG/M2 | DIASTOLIC BLOOD PRESSURE: 72 MMHG | WEIGHT: 200.6 LBS

## 2023-11-15 VITALS
HEART RATE: 68 BPM | SYSTOLIC BLOOD PRESSURE: 145 MMHG | DIASTOLIC BLOOD PRESSURE: 65 MMHG | BODY MASS INDEX: 32.99 KG/M2 | TEMPERATURE: 98.7 F | OXYGEN SATURATION: 100 % | HEIGHT: 65 IN | WEIGHT: 198 LBS | RESPIRATION RATE: 16 BRPM

## 2023-11-15 DIAGNOSIS — K80.50 BILIARY COLIC: Primary | ICD-10-CM

## 2023-11-15 DIAGNOSIS — R10.9 SIDE PAIN: ICD-10-CM

## 2023-11-15 DIAGNOSIS — R31.9 HEMATURIA, UNSPECIFIED TYPE: ICD-10-CM

## 2023-11-15 DIAGNOSIS — I27.20 PULMONARY HYPERTENSION, UNSPECIFIED (HCC): ICD-10-CM

## 2023-11-15 DIAGNOSIS — R10.10 UPPER ABDOMINAL PAIN: ICD-10-CM

## 2023-11-15 DIAGNOSIS — R10.9 SIDE PAIN: Primary | ICD-10-CM

## 2023-11-15 LAB
ALBUMIN SERPL-MCNC: 3.6 G/DL (ref 3.4–5)
ALBUMIN/GLOB SERPL: 0.8 (ref 0.8–1.7)
ALP SERPL-CCNC: 82 U/L (ref 45–117)
ALT SERPL-CCNC: 26 U/L (ref 13–56)
ANION GAP SERPL CALC-SCNC: 4 MMOL/L (ref 3–18)
APPEARANCE UR: CLEAR
AST SERPL-CCNC: 20 U/L (ref 10–38)
BACTERIA URNS QL MICRO: ABNORMAL /HPF
BASOPHILS # BLD: 0 K/UL (ref 0–0.1)
BASOPHILS NFR BLD: 1 % (ref 0–2)
BILIRUB SERPL-MCNC: 0.2 MG/DL (ref 0.2–1)
BILIRUB UR QL: NEGATIVE
BILIRUBIN, URINE, POC: NEGATIVE
BLOOD URINE, POC: ABNORMAL
BUN SERPL-MCNC: 13 MG/DL (ref 7–18)
BUN/CREAT SERPL: 14 (ref 12–20)
CALCIUM SERPL-MCNC: 8.9 MG/DL (ref 8.5–10.1)
CHLORIDE SERPL-SCNC: 107 MMOL/L (ref 100–111)
CO2 SERPL-SCNC: 29 MMOL/L (ref 21–32)
COLOR UR: YELLOW
CREAT SERPL-MCNC: 0.92 MG/DL (ref 0.6–1.3)
DIFFERENTIAL METHOD BLD: ABNORMAL
EOSINOPHIL # BLD: 0.1 K/UL (ref 0–0.4)
EOSINOPHIL NFR BLD: 1 % (ref 0–5)
EPITH CASTS URNS QL MICRO: ABNORMAL /LPF (ref 0–5)
ERYTHROCYTE [DISTWIDTH] IN BLOOD BY AUTOMATED COUNT: 11.9 % (ref 11.6–14.5)
GLOBULIN SER CALC-MCNC: 4.4 G/DL (ref 2–4)
GLUCOSE SERPL-MCNC: 107 MG/DL (ref 74–99)
GLUCOSE UR STRIP.AUTO-MCNC: NEGATIVE MG/DL
GLUCOSE URINE, POC: NEGATIVE
HCT VFR BLD AUTO: 34 % (ref 35–45)
HGB BLD-MCNC: 11.1 G/DL (ref 12–16)
HGB UR QL STRIP: ABNORMAL
IMM GRANULOCYTES # BLD AUTO: 0 K/UL (ref 0–0.04)
IMM GRANULOCYTES NFR BLD AUTO: 1 % (ref 0–0.5)
KETONES UR QL STRIP.AUTO: NEGATIVE MG/DL
KETONES, URINE, POC: NEGATIVE
LEUKOCYTE ESTERASE UR QL STRIP.AUTO: NEGATIVE
LEUKOCYTE ESTERASE, URINE, POC: NEGATIVE
LIPASE SERPL-CCNC: 33 U/L (ref 13–75)
LYMPHOCYTES # BLD: 3 K/UL (ref 0.9–3.6)
LYMPHOCYTES NFR BLD: 46 % (ref 21–52)
MCH RBC QN AUTO: 28 PG (ref 24–34)
MCHC RBC AUTO-ENTMCNC: 32.6 G/DL (ref 31–37)
MCV RBC AUTO: 85.9 FL (ref 78–100)
MONOCYTES # BLD: 0.6 K/UL (ref 0.05–1.2)
MONOCYTES NFR BLD: 10 % (ref 3–10)
NEUTS SEG # BLD: 2.7 K/UL (ref 1.8–8)
NEUTS SEG NFR BLD: 42 % (ref 40–73)
NITRITE UR QL STRIP.AUTO: NEGATIVE
NITRITE, URINE, POC: NEGATIVE
NRBC # BLD: 0 K/UL (ref 0–0.01)
NRBC BLD-RTO: 0 PER 100 WBC
PH UR STRIP: 5.5 (ref 5–8)
PH, URINE, POC: 5.5 (ref 4.6–8)
PLATELET # BLD AUTO: 331 K/UL (ref 135–420)
PMV BLD AUTO: 10.3 FL (ref 9.2–11.8)
POTASSIUM SERPL-SCNC: 3.7 MMOL/L (ref 3.5–5.5)
PROT SERPL-MCNC: 8 G/DL (ref 6.4–8.2)
PROT UR STRIP-MCNC: NEGATIVE MG/DL
PROTEIN,URINE, POC: ABNORMAL
RBC # BLD AUTO: 3.96 M/UL (ref 4.2–5.3)
RBC #/AREA URNS HPF: ABNORMAL /HPF (ref 0–5)
SODIUM SERPL-SCNC: 140 MMOL/L (ref 136–145)
SP GR UR REFRACTOMETRY: 1.02 (ref 1–1.03)
SPECIFIC GRAVITY, URINE, POC: 1.02 (ref 1–1.03)
URINALYSIS CLARITY, POC: ABNORMAL
URINALYSIS COLOR, POC: YELLOW
UROBILINOGEN UR QL STRIP.AUTO: 0.2 EU/DL (ref 0.2–1)
UROBILINOGEN, POC: ABNORMAL
WBC # BLD AUTO: 6.4 K/UL (ref 4.6–13.2)
WBC URNS QL MICRO: ABNORMAL /HPF (ref 0–4)

## 2023-11-15 PROCEDURE — G8484 FLU IMMUNIZE NO ADMIN: HCPCS | Performed by: FAMILY MEDICINE

## 2023-11-15 PROCEDURE — 99213 OFFICE O/P EST LOW 20 MIN: CPT | Performed by: FAMILY MEDICINE

## 2023-11-15 PROCEDURE — 76705 ECHO EXAM OF ABDOMEN: CPT

## 2023-11-15 PROCEDURE — 74177 CT ABD & PELVIS W/CONTRAST: CPT

## 2023-11-15 PROCEDURE — PBSHW AMB POC URINALYSIS DIP STICK AUTO W/O MICRO: Performed by: FAMILY MEDICINE

## 2023-11-15 PROCEDURE — 87086 URINE CULTURE/COLONY COUNT: CPT

## 2023-11-15 PROCEDURE — 96374 THER/PROPH/DIAG INJ IV PUSH: CPT

## 2023-11-15 PROCEDURE — 6360000002 HC RX W HCPCS: Performed by: EMERGENCY MEDICINE

## 2023-11-15 PROCEDURE — 81001 URINALYSIS AUTO W/SCOPE: CPT

## 2023-11-15 PROCEDURE — G8417 CALC BMI ABV UP PARAM F/U: HCPCS | Performed by: FAMILY MEDICINE

## 2023-11-15 PROCEDURE — 99285 EMERGENCY DEPT VISIT HI MDM: CPT

## 2023-11-15 PROCEDURE — G8428 CUR MEDS NOT DOCUMENT: HCPCS | Performed by: FAMILY MEDICINE

## 2023-11-15 PROCEDURE — 6360000004 HC RX CONTRAST MEDICATION: Performed by: EMERGENCY MEDICINE

## 2023-11-15 PROCEDURE — 1036F TOBACCO NON-USER: CPT | Performed by: FAMILY MEDICINE

## 2023-11-15 PROCEDURE — 2580000003 HC RX 258: Performed by: EMERGENCY MEDICINE

## 2023-11-15 PROCEDURE — 3017F COLORECTAL CA SCREEN DOC REV: CPT | Performed by: FAMILY MEDICINE

## 2023-11-15 PROCEDURE — 83690 ASSAY OF LIPASE: CPT

## 2023-11-15 PROCEDURE — 85025 COMPLETE CBC W/AUTO DIFF WBC: CPT

## 2023-11-15 PROCEDURE — 80053 COMPREHEN METABOLIC PANEL: CPT

## 2023-11-15 PROCEDURE — 96361 HYDRATE IV INFUSION ADD-ON: CPT

## 2023-11-15 PROCEDURE — 81003 URINALYSIS AUTO W/O SCOPE: CPT | Performed by: FAMILY MEDICINE

## 2023-11-15 RX ORDER — 0.9 % SODIUM CHLORIDE 0.9 %
1000 INTRAVENOUS SOLUTION INTRAVENOUS ONCE
Status: COMPLETED | OUTPATIENT
Start: 2023-11-15 | End: 2023-11-15

## 2023-11-15 RX ORDER — KETOROLAC TROMETHAMINE 15 MG/ML
15 INJECTION, SOLUTION INTRAMUSCULAR; INTRAVENOUS
Status: COMPLETED | OUTPATIENT
Start: 2023-11-15 | End: 2023-11-15

## 2023-11-15 RX ADMIN — SODIUM CHLORIDE 1000 ML: 9 INJECTION, SOLUTION INTRAVENOUS at 15:09

## 2023-11-15 RX ADMIN — IOPAMIDOL 100 ML: 612 INJECTION, SOLUTION INTRAVENOUS at 17:43

## 2023-11-15 RX ADMIN — KETOROLAC TROMETHAMINE 15 MG: 15 INJECTION, SOLUTION INTRAMUSCULAR; INTRAVENOUS at 15:11

## 2023-11-15 ASSESSMENT — PAIN - FUNCTIONAL ASSESSMENT
PAIN_FUNCTIONAL_ASSESSMENT: 0-10
PAIN_FUNCTIONAL_ASSESSMENT: 0-10

## 2023-11-15 ASSESSMENT — PAIN SCALES - GENERAL
PAINLEVEL_OUTOF10: 8
PAINLEVEL_OUTOF10: 0
PAINLEVEL_OUTOF10: 8
PAINLEVEL_OUTOF10: 0

## 2023-11-15 NOTE — ED PROVIDER NOTES
EMERGENCY DEPARTMENT HISTORY AND PHYSICAL EXAM    2:56 PM EST seen at this time in room QC        Date: 11/15/2023  Patient Name: James Cano    History of Presenting Illness     Chief Complaint   Patient presents with    Hematuria    Abdominal Pain         History Provided By: patient    Additional History (Context): James Cano is a 48 y.o. 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 Its low-grade nagging bloating has not gone away for 2 weeks. Worsens with eating. No vomiting no fever. No urinary symptoms. Seen at her primary care doctor's office today and referred here for gallbladder work-up. She notes is better with belching and passing gas. PCP: Raffaele Engle MD    Chief Complaint:   Duration:    Timing:    Location:   Quality:   Severity:   Modifying Factors:   Associated Symptoms:       No current facility-administered medications for this encounter.      Current Outpatient Medications   Medication Sig Dispense Refill    Cholecalciferol (VITAMIN D3 PO) Take by mouth      traZODone (DESYREL) 100 MG tablet TAKE 1 TABLET BY MOUTH NIGHTLY 90 tablet 1    sertraline (ZOLOFT) 100 MG tablet Take 1 tablet by mouth daily         Past History     Past Medical History:  Past Medical History:   Diagnosis Date    Anemia     Arthritis     Autoimmune disease ([address])     Fibroids     uterine fibroids    Fibromyalgia     Fibromyalgia     Knee pain, right     Menorrhagia     Psychiatric disorder     anxiety    Thyroid disease     ENLARGED THYROID    Tobacco abuse     quit in 2020       Past Surgical History:  Past Surgical History:   Procedure Laterality Date    HYSTERECTOMY (CERVIX STATUS UNKNOWN)      OTHER SURGICAL HISTORY      surgery for punctured intestine    OVARIAN CYST REMOVAL Bilateral     SALPINGO-OOPHORECTOMY Bilateral     TUBAL LIGATION         Family History:  Family History   Problem

## 2023-11-15 NOTE — ED NOTES
States pain free while laying on left side. IV fluids infusing. Arm repositioned to facilitate flow.   No needs voiced at this time     Juani Kelly RN  11/15/23 3400

## 2023-11-15 NOTE — ED TRIAGE NOTES
Pt ambulatory to room c/o RUQ pain x 2 weeks. Pt seen at PCP today, UA positive for blood and sent by PCP for further eval. LBM today normal for pt. Pt denies n/v.  Hx of small intestine repair 6 years ago

## 2023-11-15 NOTE — ED NOTES
Continues to feel better. IV fluids complete.  Up to bathroom without difficulty     Cheri Levine RN  11/15/23 6240

## 2023-11-16 LAB
BACTERIA SPEC CULT: NORMAL
SERVICE CMNT-IMP: NORMAL

## 2023-11-16 NOTE — ED NOTES
Discharge teaching provided to pt regarding treatment received, medications prescribed, and follow-up care. Pt verbalized understanding directions and follow up care. Pt left ambulatory with discharge paperwork in hand.       Mary Cordero RN  11/15/23 9266

## 2023-11-17 ENCOUNTER — TELEMEDICINE (OUTPATIENT)
Age: 53
End: 2023-11-17
Payer: MEDICARE

## 2023-11-17 DIAGNOSIS — R10.11 RIGHT UPPER QUADRANT PAIN: Primary | ICD-10-CM

## 2023-11-17 PROCEDURE — 99213 OFFICE O/P EST LOW 20 MIN: CPT | Performed by: FAMILY MEDICINE

## 2023-11-17 PROCEDURE — G8427 DOCREV CUR MEDS BY ELIG CLIN: HCPCS | Performed by: FAMILY MEDICINE

## 2023-11-17 PROCEDURE — 3017F COLORECTAL CA SCREEN DOC REV: CPT | Performed by: FAMILY MEDICINE

## 2023-11-17 NOTE — PROGRESS NOTES
Consent: Mel Curling, who was seen by synchronous (real-time) audio-video technology, and/or her healthcare decision maker, is aware that this patient-initiated, Telehealth encounter on 11/17/2023 is a billable service, with coverage as determined by her insurance carrier. She is aware that she may receive a bill and has provided verbal consent to proceed: Yes      Assessment & Plan:   Pacheco Gerard was seen today for follow-up. Diagnoses and all orders for this visit:    Right upper quadrant pain: During last visit sent patient to emergency room to rule out cholelithiasis/cholecystitis. Reviewed CT scan and ultrasound result. Fatty liver. She was referred to surgeon. Wanted referral for outside provider for surgery. Advised with any worsening of symptoms otherwise take Tylenol or Motrin as needed for pain. She is able to tolerate p.o. well. Advised to avoid any fatty and fried foods. -     External Referral To General Surgery      Patient understood agreed with the plan  Follow-up in 1 month    Follow-up and Dispositions    Return in about 1 month (around 12/17/2023). Subjective:   Mel Curling is a 48 y.o. female who was seen for Follow-up  Here for follow-up after emergency room visit. Last visit patient was here and had increasing right upper abdominal pain so sent her to the emergency room to evaluate further. Reviewed ER notes. CT scan and ultrasound done. She was sent to the surgeon as an outpatient. She is still symptomatic but the pain over right abdominal area is mild in intensity. On and off. Advised to avoid fatty food. Able to tolerate p.o. food well. No nausea vomiting. No urinary or bowel complaint. She agrees to see the surgeon. No fever.   Lab Results   Component Value Date    WBC 6.4 11/15/2023    HGB 11.1 (L) 11/15/2023    HCT 34.0 (L) 11/15/2023    MCV 85.9 11/15/2023     11/15/2023     Lab Results   Component Value Date     11/15/2023    K 3.7

## 2023-11-21 ENCOUNTER — TELEPHONE (OUTPATIENT)
Age: 53
End: 2023-11-21

## 2023-11-21 NOTE — TELEPHONE ENCOUNTER
Spoke to MsSly Antonio Wilkes to assist with scheduled a followed ER visit with Dr. Elisabet Bartholomew for an evaluation of biliary colic. MsSly Antonio Wilkes states she's already set up an appointment with another surgeon.

## 2023-11-28 ENCOUNTER — TELEPHONE (OUTPATIENT)
Age: 53
End: 2023-11-28

## 2023-11-28 ENCOUNTER — CARE COORDINATION (OUTPATIENT)
Facility: CLINIC | Age: 53
End: 2023-11-28

## 2023-11-28 DIAGNOSIS — R10.11 RIGHT UPPER QUADRANT PAIN: Primary | ICD-10-CM

## 2023-11-28 NOTE — CARE COORDINATION
Ambulatory Care Coordination Note  2023    Patient Current Location:  Home: [address] [place] [id number]    ACM contacted the patient by telephone. Verified name and  with patient as identifiers. Provided introduction to self, and explanation of the ACM role. ACM: Michael Carson RN    Challenges to be reviewed by the provider   Additional needs identified to be addressed with provider: No  none               Method of communication with provider: none. Hx of Anemia, OA, Fibromyalgia, anxiety, enlarged thyroid,  Recent ED visit for upper abdominal pain/biliary  Referral placed to [facility] surgeon, while in the ED, however, pt requests a non-BS surgeon during f/u PCP appt. Referral placed to [facility], but patient had not heard from them yet. Upon call, the state they do not have a referral on file. Notified PCP's office to fax to [phone] for a referral to Dr. Leonie Aviles. Relayed info to PCP's office. No other questions/issues at this time. Offered patient enrollment in the Remote Patient Monitoring (RPM) program for in-home monitoring: Patient is not eligible for RPM program.    Ambulatory Care Coordination Assessment    Care Coordination Protocol  Referral from Primary Care Provider: No  Week 1 - Initial Assessment     Do you have all of your prescriptions and are they filled?: Yes  Barriers to medication adherence: None  Are you able to afford your medications?: Yes  How often do you have trouble taking your medications the way you have been told to take them?: I always take them as prescribed.      Do you have Home O2 Therapy?: No      Is patient able to live independently?: Yes     Current Housing: Private Residence  Who do you live with?: Alone     Do you have any DME?: Yes  Patient DME: [facility]              Thinking about your patient's physical health needs, are there any symptoms or problems (risk indicators) you are unsure about that require

## 2023-11-28 NOTE — TELEPHONE ENCOUNTER
Maurice Chang called saying that patients referral was sent to the wrong Dr and it needs to be redone. She needs it sent to [facility].       Phone: [phone]  Fax: [phone]

## 2023-12-05 ENCOUNTER — TELEPHONE (OUTPATIENT)
Age: 53
End: 2023-12-05

## 2023-12-05 DIAGNOSIS — R10.11 RIGHT UPPER QUADRANT PAIN: Primary | ICD-10-CM

## 2023-12-06 NOTE — TELEPHONE ENCOUNTER
Spoke with patient (two identifiers verified) to advise [facility] not [facility] her insurance. Patient verbalized understanding. She agreed to be referred to [facility] for general surgery consult. Patient was given the phone number to [facility], [phone], to schedule an appointment.

## 2023-12-06 NOTE — TELEPHONE ENCOUNTER
[facility] does not accept patient's insurance. Patient agreed to be referred to [facility] for general surgery consult. Closing encounter.

## 2023-12-08 ENCOUNTER — CARE COORDINATION (OUTPATIENT)
Facility: CLINIC | Age: 53
End: 2023-12-08

## 2023-12-08 NOTE — CARE COORDINATION
Ambulatory Care Coordination Note  2023    Patient Current Location:  Home: [address] [place] [id number]     ACM contacted the patient by telephone. Verified name and  with patient as identifiers. Provided introduction to self, and explanation of the ACM role. Challenges to be reviewed by the provider   Additional needs identified to be addressed with provider: No  none               Method of communication with provider: none. ACM: Oumou Pearce RN    Hx of Anemia, OA, Fibromyalgia, anxiety, enlarged thyroid,  Recent ED visit for upper abdominal pain/biliary  Upon last call, pt had not been scheduled w/ surg as they had not received the referral. Unfortunately, they did not take the patient's insurance. PCP's office has since re-referred and patient has an appt w/ gen surg in 2 weeks. Pt states doing well, still some pain. Using OTCs PRN. No other questions/issues at this time. Offered patient enrollment in the Remote Patient Monitoring (RPM) program for in-home monitoring: Patient is not eligible for RPM program.    Lab Results       None            Care Coordination Interventions    Referral from Primary Care Provider: No  Suggested Interventions and Community Resources          Goals Addressed                   This Visit's Progress     Conditions and Symptoms   On track     I will schedule office visits, as directed by my provider. I will keep my appointment or reschedule if I have to cancel. I will notify my provider of any barriers to my plan of care. I will notify my provider of any symptoms that indicate a worsening of my condition. Barriers: none  Plan for overcoming my barriers: N/A  Confidence: 10/10  Anticipated Goal Completion Date: 24         Medication Management   On track     I will take my medication as directed. I will notify my provider of any problems with medications, like adverse effects or side effects.   I will notify my

## 2023-12-14 ENCOUNTER — CARE COORDINATION (OUTPATIENT)
Facility: CLINIC | Age: 53
End: 2023-12-14

## 2023-12-27 ENCOUNTER — HOSPITAL ENCOUNTER (OUTPATIENT)
Facility: HOSPITAL | Age: 53
Discharge: HOME | End: 2023-12-30
Attending: SURGERY
Payer: MEDICARE

## 2023-12-27 VITALS — WEIGHT: 192 LBS | BODY MASS INDEX: 31.95 KG/M2

## 2023-12-27 DIAGNOSIS — R10.11 RIGHT UPPER QUADRANT PAIN: ICD-10-CM

## 2023-12-27 PROCEDURE — A9537 TC99M MEBROFENIN: HCPCS | Performed by: SURGERY

## 2023-12-27 PROCEDURE — 3430000000 HC RX DIAGNOSTIC RADIOPHARMACEUTICAL: Performed by: SURGERY

## 2023-12-27 PROCEDURE — 6360000004 HC RX CONTRAST MEDICATION: Performed by: SURGERY

## 2023-12-27 PROCEDURE — 2580000003 HC RX 258: Performed by: SURGERY

## 2023-12-27 RX ORDER — KIT FOR THE PREPARATION OF TECHNETIUM TC 99M MEBROFENIN 45 MG/10ML
6.6 INJECTION, POWDER, LYOPHILIZED, FOR SOLUTION INTRAVENOUS
Status: COMPLETED | OUTPATIENT
Start: 2023-12-27 | End: 2023-12-27

## 2023-12-27 RX ORDER — 0.9 % SODIUM CHLORIDE 0.9 %
50 INTRAVENOUS SOLUTION INTRAVENOUS ONCE
Status: COMPLETED | OUTPATIENT
Start: 2023-12-27 | End: 2023-12-27

## 2023-12-27 RX ADMIN — WATER 1.74 MCG: 1 INJECTION INTRAMUSCULAR; INTRAVENOUS; SUBCUTANEOUS at 11:51

## 2023-12-27 RX ADMIN — MEBROFENIN 6.6 MILLICURIE: 45 INJECTION, POWDER, LYOPHILIZED, FOR SOLUTION INTRAVENOUS at 09:02

## 2023-12-27 RX ADMIN — SODIUM CHLORIDE 50 ML: 900 INJECTION, SOLUTION INTRAVENOUS at 11:52

## 2024-01-02 ENCOUNTER — TELEPHONE (OUTPATIENT)
Facility: HOSPITAL | Age: 54
End: 2024-01-02

## 2024-01-02 NOTE — TELEPHONE ENCOUNTER
Left voicemail for patient regarding HIDA scan which was normal.She was instructed to call us back should she continue to experience pain in the right upper quadrant.     Julissa Hyman, DO

## 2024-01-11 ENCOUNTER — CARE COORDINATION (OUTPATIENT)
Facility: CLINIC | Age: 54
End: 2024-01-11

## 2024-01-11 NOTE — CARE COORDINATION
Ambulatory Care Coordination Note  2024    Patient Current Location:  Home: [address]  [address]  [place] [address]     ACM contacted the patient by telephone. Verified name and  with patient as identifiers. Provided introduction to self, and explanation of the ACM role.     Challenges to be reviewed by the provider   Additional needs identified to be addressed with provider: No  none               Method of communication with provider: none.    ACM: Kolton Loomis RN    Hx of Anemia, OA, Fibromyalgia, anxiety, enlarged thyroid,  Recent ED visit for upper abdominal pain/biliary  Pt has followed up w/ Gen surg and has also completed a HIDA scan which resulted normal. Still occasional right back pain. Will discuss w/ PCP on upcoming appt.  No other questions/issues at this time.    Offered patient enrollment in the Remote Patient Monitoring (RPM) program for in-home monitoring: Patient is not eligible for RPM program.    Lab Results       None            Care Coordination Interventions    Referral from Primary Care Provider: No  Suggested Interventions and Community Resources          Goals Addressed                   This Visit's Progress     Conditions and Symptoms   On track     I will schedule office visits, as directed by my provider.  I will keep my appointment or reschedule if I have to cancel.  I will notify my provider of any barriers to my plan of care.  I will notify my provider of any symptoms that indicate a worsening of my condition.    Barriers: none  Plan for overcoming my barriers: N/A  Confidence: 10/10  Anticipated Goal Completion Date: 24         Medication Management   On track     I will take my medication as directed.  I will notify my provider of any problems with medications, like adverse effects or side effects.  I will notify my provider/Care Coordinator if I am unable to afford my medications.  I will notify my provider for advice before I stop taking any of my

## 2024-01-25 ENCOUNTER — CARE COORDINATION (OUTPATIENT)
Facility: CLINIC | Age: 54
End: 2024-01-25

## 2024-01-25 SDOH — ECONOMIC STABILITY: HOUSING INSECURITY: IN THE LAST 12 MONTHS, HOW MANY PLACES HAVE YOU LIVED?: 1

## 2024-01-25 SDOH — ECONOMIC STABILITY: INCOME INSECURITY: IN THE LAST 12 MONTHS, WAS THERE A TIME WHEN YOU WERE NOT ABLE TO PAY THE MORTGAGE OR RENT ON TIME?: NO

## 2024-01-25 SDOH — ECONOMIC STABILITY: FOOD INSECURITY: WITHIN THE PAST 12 MONTHS, YOU WORRIED THAT YOUR FOOD WOULD RUN OUT BEFORE YOU GOT MONEY TO BUY MORE.: SOMETIMES TRUE

## 2024-01-25 SDOH — ECONOMIC STABILITY: TRANSPORTATION INSECURITY
IN THE PAST 12 MONTHS, HAS THE LACK OF TRANSPORTATION KEPT YOU FROM MEDICAL APPOINTMENTS OR FROM GETTING MEDICATIONS?: NO

## 2024-01-25 SDOH — ECONOMIC STABILITY: FOOD INSECURITY: WITHIN THE PAST 12 MONTHS, THE FOOD YOU BOUGHT JUST DIDN'T LAST AND YOU DIDN'T HAVE MONEY TO GET MORE.: SOMETIMES TRUE

## 2024-01-25 ASSESSMENT — SOCIAL DETERMINANTS OF HEALTH (SDOH)
IN A TYPICAL WEEK, HOW MANY TIMES DO YOU TALK ON THE PHONE WITH FAMILY, FRIENDS, OR NEIGHBORS?: MORE THAN THREE TIMES A WEEK
HOW OFTEN DO YOU ATTENT MEETINGS OF THE CLUB OR ORGANIZATION YOU BELONG TO?: MORE THAN 4 TIMES PER YEAR
HOW OFTEN DO YOU ATTEND CHURCH OR RELIGIOUS SERVICES?: MORE THAN 4 TIMES PER YEAR
WITHIN THE LAST YEAR, HAVE YOU BEEN HUMILIATED OR EMOTIONALLY ABUSED IN OTHER WAYS BY YOUR PARTNER OR EX-PARTNER?: NO
WITHIN THE LAST YEAR, HAVE TO BEEN RAPED OR FORCED TO HAVE ANY KIND OF SEXUAL ACTIVITY BY YOUR PARTNER OR EX-PARTNER?: NO
HOW OFTEN DO YOU GET TOGETHER WITH FRIENDS OR RELATIVES?: THREE TIMES A WEEK
DO YOU BELONG TO ANY CLUBS OR ORGANIZATIONS SUCH AS CHURCH GROUPS UNIONS, FRATERNAL OR ATHLETIC GROUPS, OR SCHOOL GROUPS?: YES
WITHIN THE LAST YEAR, HAVE YOU BEEN KICKED, HIT, SLAPPED, OR OTHERWISE PHYSICALLY HURT BY YOUR PARTNER OR EX-PARTNER?: NO
WITHIN THE LAST YEAR, HAVE YOU BEEN AFRAID OF YOUR PARTNER OR EX-PARTNER?: NO
ARE YOU MARRIED, WIDOWED, DIVORCED, SEPARATED, NEVER MARRIED, OR LIVING WITH A PARTNER?: NEVER MARRIED

## 2024-01-25 NOTE — CARE COORDINATION
Ambulatory Care Coordination Note  2024    Patient Current Location:  Home: [address]  [address]  [place] [address]     ACM contacted the patient by telephone. Verified name and  with patient as identifiers. Provided introduction to self, and explanation of the ACM role.     Challenges to be reviewed by the provider   Additional needs identified to be addressed with provider: No  none               Method of communication with provider: none.    ACM: Kolton Loomis RN    Hx of Anemia, OA, Fibromyalgia, anxiety, enlarged thyroid,  Recent ED visit for upper abdominal pain/biliary  Pt had to cancel recent PCP visit. Had insurance issues. Awaiting appeal in March.  SDoH completed. Some food insecurity issues noted. Referred patient to local food bank. Relayed contact info. Financial stressors addressed, states some family support to help her get by.  No other questions/issues at this time.    Offered patient enrollment in the Remote Patient Monitoring (RPM) program for in-home monitoring: Patient is not eligible for RPM program.    Lab Results       None            Care Coordination Interventions    Referral from Primary Care Provider: No  Suggested Interventions and Community Resources          Goals Addressed                   This Visit's Progress     Conditions and Symptoms   On track     I will schedule office visits, as directed by my provider.  I will keep my appointment or reschedule if I have to cancel.  I will notify my provider of any barriers to my plan of care.  I will notify my provider of any symptoms that indicate a worsening of my condition.    Barriers: none  Plan for overcoming my barriers: N/A  Confidence: 10/10  Anticipated Goal Completion Date: 24         Medication Management   On track     I will take my medication as directed.  I will notify my provider of any problems with medications, like adverse effects or side effects.  I will notify my provider/Care Coordinator

## 2024-02-07 ENCOUNTER — CARE COORDINATION (OUTPATIENT)
Facility: CLINIC | Age: 54
End: 2024-02-07

## 2024-02-07 NOTE — CARE COORDINATION
Ambulatory Care Coordination Note  2024    Patient Current Location:  Home: [address]  [address]  [place] [address]     ACM contacted the patient by telephone. Verified name and  with patient as identifiers. Provided introduction to self, and explanation of the ACM role.     Challenges to be reviewed by the provider   Additional needs identified to be addressed with provider: No  none               Method of communication with provider: none.    ACM: Kolton Loomis RN    Hx of Anemia, OA, Fibromyalgia, anxiety, enlarged thyroid,  Recent ED visit for upper abdominal pain/biliary  States doing well, still awaiting appeal for insurance.  No other questions/issues at this time.    Offered patient enrollment in the Remote Patient Monitoring (RPM) program for in-home monitoring: Patient is not eligible for RPM program.    Lab Results       None            Care Coordination Interventions    Referral from Primary Care Provider: No  Suggested Interventions and Community Resources          Goals Addressed                   This Visit's Progress     Conditions and Symptoms   On track     I will schedule office visits, as directed by my provider.  I will keep my appointment or reschedule if I have to cancel.  I will notify my provider of any barriers to my plan of care.  I will notify my provider of any symptoms that indicate a worsening of my condition.    Barriers: none  Plan for overcoming my barriers: N/A  Confidence: 10/10  Anticipated Goal Completion Date: 24         Medication Management   On track     I will take my medication as directed.  I will notify my provider of any problems with medications, like adverse effects or side effects.  I will notify my provider/Care Coordinator if I am unable to afford my medications.  I will notify my provider for advice before I stop taking any of my medication.    Barriers: none  Plan for overcoming my barriers: N/A  Confidence: 10/10  Anticipated Goal

## 2024-02-19 ENCOUNTER — CARE COORDINATION (OUTPATIENT)
Facility: CLINIC | Age: 54
End: 2024-02-19

## 2024-02-19 NOTE — CARE COORDINATION
my medication.    Barriers: none  Plan for overcoming my barriers: N/A  Confidence: 10/10  Anticipated Goal Completion Date: 2/28/24       Reduce Risk of Hospitalization   On track     I will take appropriate steps to reduce my risk of Hospitalization to include:  Take all of medications as prescribed  Visit w/ all of my recommended specialists.  Visit w/ my PCP as recommended.  Avoid activities that can trigger my chronic conditions.    Barriers: none  Plan for overcoming my barriers: N/A  Confidence: 10/10  Anticipated Goal Completion Date: 2/28/24                Future Appointments   Date Time Provider Department Center   11/18/2024  9:30 AM Hany Gongora MD [facility]

## 2024-03-08 ENCOUNTER — CARE COORDINATION (OUTPATIENT)
Facility: CLINIC | Age: 54
End: 2024-03-08

## 2024-03-08 NOTE — CARE COORDINATION
Patient has graduated from the Complex Care program on 3/8/24.  Patient/family has the ability to self-manage at this time.  Care management goals have been completed. No further Ambulatory Care Manager follow up scheduled.     Goals Addressed                   This Visit's Progress     COMPLETED: Conditions and Symptoms        I will schedule office visits, as directed by my provider.  I will keep my appointment or reschedule if I have to cancel.  I will notify my provider of any barriers to my plan of care.  I will notify my provider of any symptoms that indicate a worsening of my condition.    Barriers: none  Plan for overcoming my barriers: N/A  Confidence: 10/10  Anticipated Goal Completion Date: 2/28/24         COMPLETED: Medication Management        I will take my medication as directed.  I will notify my provider of any problems with medications, like adverse effects or side effects.  I will notify my provider/Care Coordinator if I am unable to afford my medications.  I will notify my provider for advice before I stop taking any of my medication.    Barriers: none  Plan for overcoming my barriers: N/A  Confidence: 10/10  Anticipated Goal Completion Date: 2/28/24       COMPLETED: Reduce Risk of Hospitalization        I will take appropriate steps to reduce my risk of Hospitalization to include:  Take all of medications as prescribed  Visit w/ all of my recommended specialists.  Visit w/ my PCP as recommended.  Avoid activities that can trigger my chronic conditions.    Barriers: none  Plan for overcoming my barriers: N/A  Confidence: 10/10  Anticipated Goal Completion Date: 2/28/24                Patient has Ambulatory Care Manager's contact information for any further questions, concerns, or needs.  Patients upcoming visits:    Future Appointments   Date Time Provider Department Center   11/18/2024  9:30 AM Hany Gongora MD [facility]

## 2024-03-15 ENCOUNTER — TRANSCRIBE ORDERS (OUTPATIENT)
Facility: HOSPITAL | Age: 54
End: 2024-03-15

## 2024-03-15 DIAGNOSIS — Z12.31 VISIT FOR SCREENING MAMMOGRAM: Primary | ICD-10-CM

## 2024-04-01 RX ORDER — TRAZODONE HYDROCHLORIDE 100 MG/1
100 TABLET ORAL NIGHTLY
Qty: 90 TABLET | Refills: 0 | Status: SHIPPED | OUTPATIENT
Start: 2024-04-01

## 2024-04-02 ENCOUNTER — HOSPITAL ENCOUNTER (OUTPATIENT)
Facility: HOSPITAL | Age: 54
Discharge: HOME | End: 2024-04-05
Attending: FAMILY MEDICINE

## 2024-04-02 DIAGNOSIS — Z12.31 VISIT FOR SCREENING MAMMOGRAM: ICD-10-CM

## 2024-04-04 PROBLEM — F33.2 DEPRESSION, MAJOR, SEVERE RECURRENCE (HCC): Status: RESOLVED | Noted: 2019-12-16 | Resolved: 2024-04-04

## 2024-04-25 ENCOUNTER — HOSPITAL ENCOUNTER (OUTPATIENT)
Facility: HOSPITAL | Age: 54
End: 2024-04-25
Attending: FAMILY MEDICINE
Payer: MEDICARE

## 2024-04-25 ENCOUNTER — HOSPITAL ENCOUNTER (OUTPATIENT)
Facility: HOSPITAL | Age: 54
Discharge: HOME | End: 2024-04-25
Attending: FAMILY MEDICINE
Payer: MEDICARE

## 2024-04-25 VITALS — HEIGHT: 65 IN | WEIGHT: 192 LBS | BODY MASS INDEX: 31.99 KG/M2

## 2024-04-25 DIAGNOSIS — N64.4 BREAST PAIN: ICD-10-CM

## 2024-04-25 PROCEDURE — G0279 TOMOSYNTHESIS, MAMMO: HCPCS

## 2024-04-25 PROCEDURE — 76642 ULTRASOUND BREAST LIMITED: CPT

## 2024-05-21 NOTE — ED NOTES
Bedside Hand-off-Report given to oncoming shift RN Arash Peace), including  all care previously given, SBAR report, patient history, chief complaint, ordered medications, allergies, and pending orders/procedures. Addressed all questions asked by oncoming RN. 1 = Total assistance

## 2024-07-03 RX ORDER — TRAZODONE HYDROCHLORIDE 100 MG/1
100 TABLET ORAL NIGHTLY
Qty: 90 TABLET | Refills: 1 | Status: SHIPPED | OUTPATIENT
Start: 2024-07-03

## 2024-09-11 SDOH — HEALTH STABILITY: PHYSICAL HEALTH: ON AVERAGE, HOW MANY DAYS PER WEEK DO YOU ENGAGE IN MODERATE TO STRENUOUS EXERCISE (LIKE A BRISK WALK)?: 7 DAYS

## 2024-09-11 SDOH — ECONOMIC STABILITY: FOOD INSECURITY: WITHIN THE PAST 12 MONTHS, YOU WORRIED THAT YOUR FOOD WOULD RUN OUT BEFORE YOU GOT MONEY TO BUY MORE.: OFTEN TRUE

## 2024-09-11 SDOH — ECONOMIC STABILITY: INCOME INSECURITY: HOW HARD IS IT FOR YOU TO PAY FOR THE VERY BASICS LIKE FOOD, HOUSING, MEDICAL CARE, AND HEATING?: VERY HARD

## 2024-09-11 SDOH — ECONOMIC STABILITY: FOOD INSECURITY: WITHIN THE PAST 12 MONTHS, THE FOOD YOU BOUGHT JUST DIDN'T LAST AND YOU DIDN'T HAVE MONEY TO GET MORE.: OFTEN TRUE

## 2024-09-11 SDOH — HEALTH STABILITY: PHYSICAL HEALTH: ON AVERAGE, HOW MANY MINUTES DO YOU ENGAGE IN EXERCISE AT THIS LEVEL?: 10 MIN

## 2024-09-11 ASSESSMENT — PATIENT HEALTH QUESTIONNAIRE - PHQ9
1. LITTLE INTEREST OR PLEASURE IN DOING THINGS: SEVERAL DAYS
2. FEELING DOWN, DEPRESSED OR HOPELESS: SEVERAL DAYS
SUM OF ALL RESPONSES TO PHQ QUESTIONS 1-9: 6
6. FEELING BAD ABOUT YOURSELF - OR THAT YOU ARE A FAILURE OR HAVE LET YOURSELF OR YOUR FAMILY DOWN: NOT AT ALL
8. MOVING OR SPEAKING SO SLOWLY THAT OTHER PEOPLE COULD HAVE NOTICED. OR THE OPPOSITE, BEING SO FIGETY OR RESTLESS THAT YOU HAVE BEEN MOVING AROUND A LOT MORE THAN USUAL: SEVERAL DAYS
4. FEELING TIRED OR HAVING LITTLE ENERGY: SEVERAL DAYS
10. IF YOU CHECKED OFF ANY PROBLEMS, HOW DIFFICULT HAVE THESE PROBLEMS MADE IT FOR YOU TO DO YOUR WORK, TAKE CARE OF THINGS AT HOME, OR GET ALONG WITH OTHER PEOPLE: SOMEWHAT DIFFICULT
SUM OF ALL RESPONSES TO PHQ QUESTIONS 1-9: 6
SUM OF ALL RESPONSES TO PHQ QUESTIONS 1-9: 6
3. TROUBLE FALLING OR STAYING ASLEEP: SEVERAL DAYS
5. POOR APPETITE OR OVEREATING: SEVERAL DAYS
SUM OF ALL RESPONSES TO PHQ QUESTIONS 1-9: 6
7. TROUBLE CONCENTRATING ON THINGS, SUCH AS READING THE NEWSPAPER OR WATCHING TELEVISION: NOT AT ALL
SUM OF ALL RESPONSES TO PHQ9 QUESTIONS 1 & 2: 2
9. THOUGHTS THAT YOU WOULD BE BETTER OFF DEAD, OR OF HURTING YOURSELF: NOT AT ALL

## 2024-09-11 ASSESSMENT — LIFESTYLE VARIABLES
HOW MANY STANDARD DRINKS CONTAINING ALCOHOL DO YOU HAVE ON A TYPICAL DAY: PATIENT DOES NOT DRINK
HOW MANY STANDARD DRINKS CONTAINING ALCOHOL DO YOU HAVE ON A TYPICAL DAY: 0
HOW OFTEN DO YOU HAVE SIX OR MORE DRINKS ON ONE OCCASION: 1
HOW OFTEN DO YOU HAVE A DRINK CONTAINING ALCOHOL: 1
HOW OFTEN DO YOU HAVE A DRINK CONTAINING ALCOHOL: NEVER

## 2024-09-12 ENCOUNTER — OFFICE VISIT (OUTPATIENT)
Facility: CLINIC | Age: 54
End: 2024-09-12
Payer: MEDICARE

## 2024-09-12 VITALS
TEMPERATURE: 97.3 F | RESPIRATION RATE: 16 BRPM | WEIGHT: 195 LBS | HEIGHT: 65 IN | OXYGEN SATURATION: 97 % | BODY MASS INDEX: 32.49 KG/M2 | DIASTOLIC BLOOD PRESSURE: 70 MMHG | SYSTOLIC BLOOD PRESSURE: 122 MMHG | HEART RATE: 79 BPM

## 2024-09-12 DIAGNOSIS — K21.9 GASTROESOPHAGEAL REFLUX DISEASE WITHOUT ESOPHAGITIS: ICD-10-CM

## 2024-09-12 DIAGNOSIS — E04.2 MULTINODULAR GOITER: ICD-10-CM

## 2024-09-12 DIAGNOSIS — Z00.00 MEDICARE ANNUAL WELLNESS VISIT, SUBSEQUENT: Primary | ICD-10-CM

## 2024-09-12 DIAGNOSIS — M79.89 FINGER SWELLING: ICD-10-CM

## 2024-09-12 DIAGNOSIS — F32.89 OTHER DEPRESSION: ICD-10-CM

## 2024-09-12 DIAGNOSIS — E78.5 DYSLIPIDEMIA: ICD-10-CM

## 2024-09-12 DIAGNOSIS — M79.7 FIBROMYALGIA: ICD-10-CM

## 2024-09-12 DIAGNOSIS — Z13.1 SCREENING FOR DIABETES MELLITUS: ICD-10-CM

## 2024-09-12 PROCEDURE — G0439 PPPS, SUBSEQ VISIT: HCPCS | Performed by: FAMILY MEDICINE

## 2024-09-12 PROCEDURE — 99214 OFFICE O/P EST MOD 30 MIN: CPT | Performed by: FAMILY MEDICINE

## 2024-09-12 RX ORDER — FAMOTIDINE 20 MG/1
20 TABLET, FILM COATED ORAL 2 TIMES DAILY
Qty: 180 TABLET | Refills: 0 | Status: SHIPPED | OUTPATIENT
Start: 2024-09-12

## 2024-09-23 LAB
BASOPHILS # BLD AUTO: 0 X10E3/UL (ref 0–0.2)
BASOPHILS NFR BLD AUTO: 0 %
EOSINOPHIL # BLD AUTO: 0.1 X10E3/UL (ref 0–0.4)
EOSINOPHIL NFR BLD AUTO: 1 %
ERYTHROCYTE [DISTWIDTH] IN BLOOD BY AUTOMATED COUNT: 12.2 % (ref 11.7–15.4)
HCT VFR BLD AUTO: 35.2 % (ref 34–46.6)
HGB BLD-MCNC: 11.1 G/DL (ref 11.1–15.9)
IMM GRANULOCYTES # BLD AUTO: 0 X10E3/UL (ref 0–0.1)
IMM GRANULOCYTES NFR BLD AUTO: 0 %
LYMPHOCYTES # BLD AUTO: 3 X10E3/UL (ref 0.7–3.1)
LYMPHOCYTES NFR BLD AUTO: 39 %
MCH RBC QN AUTO: 27.7 PG (ref 26.6–33)
MCHC RBC AUTO-ENTMCNC: 31.5 G/DL (ref 31.5–35.7)
MCV RBC AUTO: 88 FL (ref 79–97)
MONOCYTES # BLD AUTO: 0.7 X10E3/UL (ref 0.1–0.9)
MONOCYTES NFR BLD AUTO: 9 %
NEUTROPHILS # BLD AUTO: 3.8 X10E3/UL (ref 1.4–7)
NEUTROPHILS NFR BLD AUTO: 51 %
PLATELET # BLD AUTO: 357 X10E3/UL (ref 150–450)
RBC # BLD AUTO: 4.01 X10E6/UL (ref 3.77–5.28)
SPECIMEN STATUS REPORT: NORMAL
WBC # BLD AUTO: 7.6 X10E3/UL (ref 3.4–10.8)

## 2024-09-24 LAB
ALBUMIN SERPL-MCNC: 4.3 G/DL (ref 3.8–4.9)
ALP SERPL-CCNC: 82 IU/L (ref 44–121)
ALT SERPL-CCNC: 14 IU/L (ref 0–32)
AST SERPL-CCNC: 20 IU/L (ref 0–40)
BILIRUB SERPL-MCNC: 0.3 MG/DL (ref 0–1.2)
BUN SERPL-MCNC: 12 MG/DL (ref 6–24)
BUN/CREAT SERPL: 13 (ref 9–23)
CALCIUM SERPL-MCNC: 9.4 MG/DL (ref 8.7–10.2)
CHLORIDE SERPL-SCNC: 101 MMOL/L (ref 96–106)
CHOLEST SERPL-MCNC: 211 MG/DL (ref 100–199)
CO2 SERPL-SCNC: 23 MMOL/L (ref 20–29)
CREAT SERPL-MCNC: 0.9 MG/DL (ref 0.57–1)
EGFRCR SERPLBLD CKD-EPI 2021: 77 ML/MIN/1.73
GLOBULIN SER CALC-MCNC: 2.7 G/DL (ref 1.5–4.5)
GLUCOSE SERPL-MCNC: 92 MG/DL (ref 70–99)
HBA1C MFR BLD: 5.5 % (ref 4.8–5.6)
HDLC SERPL-MCNC: 46 MG/DL
LDLC SERPL CALC-MCNC: 120 MG/DL (ref 0–99)
POTASSIUM SERPL-SCNC: 4.3 MMOL/L (ref 3.5–5.2)
PROT SERPL-MCNC: 7 G/DL (ref 6–8.5)
SODIUM SERPL-SCNC: 140 MMOL/L (ref 134–144)
T4 FREE SERPL-MCNC: 1.21 NG/DL (ref 0.82–1.77)
TRIGL SERPL-MCNC: 257 MG/DL (ref 0–149)
TSH SERPL DL<=0.005 MIU/L-ACNC: 0.73 UIU/ML (ref 0.45–4.5)
VLDLC SERPL CALC-MCNC: 45 MG/DL (ref 5–40)

## 2024-09-25 ENCOUNTER — TELEPHONE (OUTPATIENT)
Facility: CLINIC | Age: 54
End: 2024-09-25

## 2024-12-09 DIAGNOSIS — K21.9 GASTROESOPHAGEAL REFLUX DISEASE WITHOUT ESOPHAGITIS: ICD-10-CM

## 2024-12-11 RX ORDER — FAMOTIDINE 20 MG/1
20 TABLET, FILM COATED ORAL 2 TIMES DAILY
Qty: 180 TABLET | Refills: 1 | Status: SHIPPED | OUTPATIENT
Start: 2024-12-11

## 2025-01-10 RX ORDER — TRAZODONE HYDROCHLORIDE 100 MG/1
100 TABLET ORAL NIGHTLY
Qty: 90 TABLET | Refills: 1 | Status: SHIPPED | OUTPATIENT
Start: 2025-01-10

## 2025-03-16 SDOH — ECONOMIC STABILITY: FOOD INSECURITY: WITHIN THE PAST 12 MONTHS, YOU WORRIED THAT YOUR FOOD WOULD RUN OUT BEFORE YOU GOT MONEY TO BUY MORE.: OFTEN TRUE

## 2025-03-16 SDOH — ECONOMIC STABILITY: FOOD INSECURITY: WITHIN THE PAST 12 MONTHS, THE FOOD YOU BOUGHT JUST DIDN'T LAST AND YOU DIDN'T HAVE MONEY TO GET MORE.: OFTEN TRUE

## 2025-03-16 SDOH — ECONOMIC STABILITY: INCOME INSECURITY: IN THE LAST 12 MONTHS, WAS THERE A TIME WHEN YOU WERE NOT ABLE TO PAY THE MORTGAGE OR RENT ON TIME?: YES

## 2025-03-17 ENCOUNTER — OFFICE VISIT (OUTPATIENT)
Facility: CLINIC | Age: 55
End: 2025-03-17
Payer: MEDICARE

## 2025-03-17 VITALS
TEMPERATURE: 97.2 F | RESPIRATION RATE: 16 BRPM | WEIGHT: 198 LBS | HEART RATE: 75 BPM | DIASTOLIC BLOOD PRESSURE: 60 MMHG | BODY MASS INDEX: 32.99 KG/M2 | SYSTOLIC BLOOD PRESSURE: 116 MMHG | OXYGEN SATURATION: 97 % | HEIGHT: 65 IN

## 2025-03-17 DIAGNOSIS — E04.2 MULTINODULAR GOITER: ICD-10-CM

## 2025-03-17 DIAGNOSIS — F32.89 OTHER DEPRESSION: ICD-10-CM

## 2025-03-17 DIAGNOSIS — Z12.31 ENCOUNTER FOR SCREENING MAMMOGRAM FOR MALIGNANT NEOPLASM OF BREAST: ICD-10-CM

## 2025-03-17 DIAGNOSIS — K59.09 OTHER CONSTIPATION: ICD-10-CM

## 2025-03-17 DIAGNOSIS — M79.7 FIBROMYALGIA: ICD-10-CM

## 2025-03-17 DIAGNOSIS — E78.1 HYPERTRIGLYCERIDEMIA: ICD-10-CM

## 2025-03-17 DIAGNOSIS — M76.60 ACHILLES TENDON PAIN: Primary | ICD-10-CM

## 2025-03-17 PROCEDURE — 99214 OFFICE O/P EST MOD 30 MIN: CPT | Performed by: FAMILY MEDICINE

## 2025-03-17 RX ORDER — SERTRALINE HYDROCHLORIDE 25 MG/1
25 TABLET, FILM COATED ORAL DAILY
Qty: 90 TABLET | Refills: 0 | Status: SHIPPED | OUTPATIENT
Start: 2025-03-17

## 2025-03-17 ASSESSMENT — PATIENT HEALTH QUESTIONNAIRE - PHQ9
3. TROUBLE FALLING OR STAYING ASLEEP: SEVERAL DAYS
9. THOUGHTS THAT YOU WOULD BE BETTER OFF DEAD, OR OF HURTING YOURSELF: NOT AT ALL
4. FEELING TIRED OR HAVING LITTLE ENERGY: NEARLY EVERY DAY
SUM OF ALL RESPONSES TO PHQ QUESTIONS 1-9: 7
SUM OF ALL RESPONSES TO PHQ QUESTIONS 1-9: 7
1. LITTLE INTEREST OR PLEASURE IN DOING THINGS: NOT AT ALL
SUM OF ALL RESPONSES TO PHQ QUESTIONS 1-9: 7
5. POOR APPETITE OR OVEREATING: SEVERAL DAYS
SUM OF ALL RESPONSES TO PHQ QUESTIONS 1-9: 7
7. TROUBLE CONCENTRATING ON THINGS, SUCH AS READING THE NEWSPAPER OR WATCHING TELEVISION: NOT AT ALL
10. IF YOU CHECKED OFF ANY PROBLEMS, HOW DIFFICULT HAVE THESE PROBLEMS MADE IT FOR YOU TO DO YOUR WORK, TAKE CARE OF THINGS AT HOME, OR GET ALONG WITH OTHER PEOPLE: SOMEWHAT DIFFICULT
8. MOVING OR SPEAKING SO SLOWLY THAT OTHER PEOPLE COULD HAVE NOTICED. OR THE OPPOSITE, BEING SO FIGETY OR RESTLESS THAT YOU HAVE BEEN MOVING AROUND A LOT MORE THAN USUAL: SEVERAL DAYS
6. FEELING BAD ABOUT YOURSELF - OR THAT YOU ARE A FAILURE OR HAVE LET YOURSELF OR YOUR FAMILY DOWN: NOT AT ALL
2. FEELING DOWN, DEPRESSED OR HOPELESS: SEVERAL DAYS

## 2025-03-17 NOTE — PATIENT INSTRUCTIONS
services, food pantry and includes intensive case management.        [facility]  [address] [place] [address] [phone]  Helpful Info: Provide emergency shelter to families and children.  The crisis line is the central point of contact for all persons experiencing a housing crisis throughout [facility].    [place] [place] (Men Only- must be ambulatory)   [address] [place], [place]   [phone] (arrive between 4 pm- 9 pm)

## 2025-03-17 NOTE — PROGRESS NOTES
\"Have you been to the ER, urgent care clinic since your last visit?  Hospitalized since your last visit?\"    NO    “Have you seen or consulted any other health care providers outside our system since your last visit?”    NO    Chief Complaint   Patient presents with    Depression    Cholesterol Problem    Goiter     Multinodular goiter       Most recent office note and ultrasound report has been requested from Dr. Blackwood.                
more frequent dosing results in constipation.    Her fibromyalgia is currently well-managed.    She has been adhering to a low-fat diet for the past month, avoiding fried foods and consuming baked fish and chicken.    Supplemental Information  She has a history of pulmonary hypertension and occasionally experiences shortness of breath. She also reports occasional dizziness and nausea upon standing.    MEDICATIONS  Current: Voltaren gel, MiraLAX, vitamin D supplement  Discontinued: Zoloft, trazodone    Review of Systems       Objective   Blood pressure 116/60, pulse 75, temperature 97.2 °F (36.2 °C), temperature source Temporal, resp. rate 16, height 1.651 m (5' 5\"), weight 89.8 kg (198 lb), SpO2 97%.  Physical Exam  The patient is sitting comfortably without any acute distress.  Lungs are clear.  Heart rhythm is regular.  Abdomen is nontender.  There is localized discomfort and tenderness around the posterior ankle of both feet. Range of motion in the feet is within normal limits. No swelling or redness in the feet. Extremities show no edema.  The patient is awake, alert, and oriented to time and place.  Behavior is normal.    Vital Signs  Blood pressure and heart rate are normal.         The patient (or guardian, if applicable) and other individuals in attendance with the patient were advised that Artificial Intelligence will be utilized during this visit to record, process the conversation to generate a clinical note and to support improvement of the AI technology. The patient (or guardian, if applicable) and other individuals in attendance at the appointment consented to the use of AI, including the recording.      An electronic signature was used to authenticate this note.    --Kathya Alston MD

## 2025-04-22 ENCOUNTER — HOSPITAL ENCOUNTER (OUTPATIENT)
Facility: HOSPITAL | Age: 55
Discharge: HOME | End: 2025-04-25
Payer: MEDICARE

## 2025-04-22 VITALS — HEIGHT: 65 IN | BODY MASS INDEX: 26.66 KG/M2 | WEIGHT: 160 LBS

## 2025-04-22 DIAGNOSIS — Z12.31 ENCOUNTER FOR SCREENING MAMMOGRAM FOR MALIGNANT NEOPLASM OF BREAST: ICD-10-CM

## 2025-04-22 PROCEDURE — 77063 BREAST TOMOSYNTHESIS BI: CPT

## 2025-04-23 ENCOUNTER — TELEPHONE (OUTPATIENT)
Facility: CLINIC | Age: 55
End: 2025-04-23

## 2025-04-23 ENCOUNTER — RESULTS FOLLOW-UP (OUTPATIENT)
Facility: CLINIC | Age: 55
End: 2025-04-23

## 2025-04-23 DIAGNOSIS — R92.8 ABNORMAL MAMMOGRAM OF RIGHT BREAST: Primary | ICD-10-CM

## 2025-05-14 ENCOUNTER — HOSPITAL ENCOUNTER (OUTPATIENT)
Dept: WOMENS IMAGING | Facility: HOSPITAL | Age: 55
Discharge: HOME | End: 2025-05-17
Attending: FAMILY MEDICINE
Payer: MEDICARE

## 2025-05-14 ENCOUNTER — RESULTS FOLLOW-UP (OUTPATIENT)
Facility: CLINIC | Age: 55
End: 2025-05-14

## 2025-05-14 ENCOUNTER — HOSPITAL ENCOUNTER (OUTPATIENT)
Facility: HOSPITAL | Age: 55
Discharge: HOME | End: 2025-05-17
Attending: FAMILY MEDICINE
Payer: MEDICARE

## 2025-05-14 DIAGNOSIS — R92.8 ABNORMAL MAMMOGRAM OF RIGHT BREAST: Primary | ICD-10-CM

## 2025-05-14 DIAGNOSIS — R92.8 ABNORMAL MAMMOGRAM OF RIGHT BREAST: ICD-10-CM

## 2025-05-14 PROCEDURE — 76642 ULTRASOUND BREAST LIMITED: CPT

## 2025-05-14 PROCEDURE — G0279 TOMOSYNTHESIS, MAMMO: HCPCS

## 2025-06-11 ENCOUNTER — HOSPITAL ENCOUNTER (OUTPATIENT)
Facility: HOSPITAL | Age: 55
Discharge: HOME | End: 2025-06-14
Payer: MEDICARE

## 2025-06-11 DIAGNOSIS — K59.09 OTHER CONSTIPATION: ICD-10-CM

## 2025-06-11 DIAGNOSIS — E04.2 MULTINODULAR GOITER: ICD-10-CM

## 2025-06-11 DIAGNOSIS — F32.89 OTHER DEPRESSION: ICD-10-CM

## 2025-06-11 DIAGNOSIS — E78.1 HYPERTRIGLYCERIDEMIA: ICD-10-CM

## 2025-06-11 LAB
ALBUMIN SERPL-MCNC: 3.5 G/DL (ref 3.4–5)
ALBUMIN/GLOB SERPL: 1.1 (ref 0.8–1.7)
ALP SERPL-CCNC: 70 U/L (ref 45–117)
ALT SERPL-CCNC: 17 U/L (ref 10–35)
ANION GAP SERPL CALC-SCNC: 12 MMOL/L (ref 3–18)
AST SERPL-CCNC: 21 U/L (ref 10–38)
BILIRUB SERPL-MCNC: <0.2 MG/DL (ref 0.2–1)
BUN SERPL-MCNC: 10 MG/DL (ref 6–23)
BUN/CREAT SERPL: 12 (ref 12–20)
CALCIUM SERPL-MCNC: 9.7 MG/DL (ref 8.5–10.1)
CHLORIDE SERPL-SCNC: 105 MMOL/L (ref 98–107)
CHOLEST SERPL-MCNC: 183 MG/DL
CO2 SERPL-SCNC: 23 MMOL/L (ref 21–32)
CREAT SERPL-MCNC: 0.84 MG/DL (ref 0.6–1.3)
GLOBULIN SER CALC-MCNC: 3.2 G/DL (ref 2–4)
GLUCOSE SERPL-MCNC: 97 MG/DL (ref 74–108)
HDLC SERPL-MCNC: 51 MG/DL (ref 40–60)
HDLC SERPL: 3.6 (ref 0–5)
LDLC SERPL CALC-MCNC: 112 MG/DL (ref 0–100)
POTASSIUM SERPL-SCNC: 4.4 MMOL/L (ref 3.5–5.5)
PROT SERPL-MCNC: 6.7 G/DL (ref 6.4–8.2)
SODIUM SERPL-SCNC: 140 MMOL/L (ref 136–145)
TRIGL SERPL-MCNC: 100 MG/DL (ref 0–150)
TSH, 3RD GENERATION: 0.53 UIU/ML (ref 0.27–4.2)
VLDLC SERPL CALC-MCNC: 20 MG/DL

## 2025-06-11 PROCEDURE — 84443 ASSAY THYROID STIM HORMONE: CPT

## 2025-06-11 PROCEDURE — 36415 COLL VENOUS BLD VENIPUNCTURE: CPT

## 2025-06-11 PROCEDURE — 80053 COMPREHEN METABOLIC PANEL: CPT

## 2025-06-11 PROCEDURE — 80061 LIPID PANEL: CPT

## 2025-06-12 ENCOUNTER — RESULTS FOLLOW-UP (OUTPATIENT)
Facility: CLINIC | Age: 55
End: 2025-06-12

## 2025-06-15 DIAGNOSIS — F32.89 OTHER DEPRESSION: ICD-10-CM

## 2025-06-16 RX ORDER — SERTRALINE HYDROCHLORIDE 25 MG/1
25 TABLET, FILM COATED ORAL DAILY
Qty: 90 TABLET | Refills: 1 | Status: SHIPPED | OUTPATIENT
Start: 2025-06-16

## 2025-06-17 ENCOUNTER — OFFICE VISIT (OUTPATIENT)
Facility: CLINIC | Age: 55
End: 2025-06-17
Payer: MEDICARE

## 2025-06-17 VITALS
RESPIRATION RATE: 16 BRPM | OXYGEN SATURATION: 97 % | SYSTOLIC BLOOD PRESSURE: 124 MMHG | DIASTOLIC BLOOD PRESSURE: 70 MMHG | BODY MASS INDEX: 32.09 KG/M2 | HEIGHT: 65 IN | TEMPERATURE: 97 F | HEART RATE: 71 BPM | WEIGHT: 192.6 LBS

## 2025-06-17 DIAGNOSIS — Z09 HOSPITAL DISCHARGE FOLLOW-UP: Primary | ICD-10-CM

## 2025-06-17 DIAGNOSIS — M62.838 MUSCLE SPASM: ICD-10-CM

## 2025-06-17 PROCEDURE — 1111F DSCHRG MED/CURRENT MED MERGE: CPT | Performed by: FAMILY MEDICINE

## 2025-06-17 PROCEDURE — 99213 OFFICE O/P EST LOW 20 MIN: CPT | Performed by: FAMILY MEDICINE

## 2025-06-17 RX ORDER — CYCLOBENZAPRINE HCL 5 MG
5 TABLET ORAL NIGHTLY PRN
Qty: 30 TABLET | Refills: 0 | Status: SHIPPED | OUTPATIENT
Start: 2025-06-17

## 2025-06-17 RX ORDER — ASPIRIN 81 MG/1
81 TABLET, CHEWABLE ORAL DAILY
COMMUNITY
Start: 2025-05-30

## 2025-06-17 RX ORDER — TRAZODONE HYDROCHLORIDE 100 MG/1
100 TABLET ORAL NIGHTLY
COMMUNITY
Start: 2025-05-27

## 2025-06-17 RX ORDER — SENNOSIDES 8.6 MG
1300 CAPSULE ORAL
COMMUNITY
Start: 2025-05-30

## 2025-06-17 NOTE — PROGRESS NOTES
Have you been to the ER, urgent care clinic since your last visit?  Hospitalized since your last visit?   [facility] ED LOV: 5/30/25    Have you seen or consulted any other health care providers outside our system since your last visit?   NO    Chief Complaint   Patient presents with    Follow-Up from Hospital     [facility] ED on 5/30/25 for headache    \"Star horses\" in back of calves    Chest tightness    spasms     Muscles spasms on right side    Results            
patient were advised that Artificial Intelligence will be utilized during this visit to record, process the conversation to generate a clinical note and to support improvement of the AI technology. The patient (or guardian, if applicable) and other individuals in attendance at the appointment consented to the use of AI, including the recording.      An electronic signature was used to authenticate this note.    --Kathya Alston MD